# Patient Record
Sex: FEMALE | Race: WHITE | NOT HISPANIC OR LATINO | URBAN - METROPOLITAN AREA
[De-identification: names, ages, dates, MRNs, and addresses within clinical notes are randomized per-mention and may not be internally consistent; named-entity substitution may affect disease eponyms.]

---

## 2022-02-07 VITALS
DIASTOLIC BLOOD PRESSURE: 65 MMHG | TEMPERATURE: 98 F | OXYGEN SATURATION: 88 % | HEIGHT: 62 IN | SYSTOLIC BLOOD PRESSURE: 118 MMHG | RESPIRATION RATE: 18 BRPM | WEIGHT: 250 LBS | HEART RATE: 64 BPM

## 2022-02-07 PROBLEM — Z00.00 ENCOUNTER FOR PREVENTIVE HEALTH EXAMINATION: Status: ACTIVE | Noted: 2022-02-07

## 2022-02-07 LAB
ALBUMIN SERPL ELPH-MCNC: 3.5 G/DL — SIGNIFICANT CHANGE UP (ref 3.3–5)
ALP SERPL-CCNC: 92 U/L — SIGNIFICANT CHANGE UP (ref 40–120)
ALT FLD-CCNC: 12 U/L — SIGNIFICANT CHANGE UP (ref 10–45)
ANION GAP SERPL CALC-SCNC: 10 MMOL/L — SIGNIFICANT CHANGE UP (ref 5–17)
AST SERPL-CCNC: 15 U/L — SIGNIFICANT CHANGE UP (ref 10–40)
BASE EXCESS BLDV CALC-SCNC: 13.8 MMOL/L — HIGH (ref -2–3)
BASOPHILS # BLD AUTO: 0.04 K/UL — SIGNIFICANT CHANGE UP (ref 0–0.2)
BASOPHILS NFR BLD AUTO: 0.4 % — SIGNIFICANT CHANGE UP (ref 0–2)
BILIRUB SERPL-MCNC: 0.5 MG/DL — SIGNIFICANT CHANGE UP (ref 0.2–1.2)
BUN SERPL-MCNC: 21 MG/DL — SIGNIFICANT CHANGE UP (ref 7–23)
CA-I SERPL-SCNC: 1.15 MMOL/L — SIGNIFICANT CHANGE UP (ref 1.15–1.33)
CALCIUM SERPL-MCNC: 9.4 MG/DL — SIGNIFICANT CHANGE UP (ref 8.4–10.5)
CHLORIDE SERPL-SCNC: 93 MMOL/L — LOW (ref 96–108)
CO2 BLDV-SCNC: 42.6 MMOL/L — HIGH (ref 22–26)
CO2 SERPL-SCNC: 36 MMOL/L — HIGH (ref 22–31)
CREAT SERPL-MCNC: 1.02 MG/DL — SIGNIFICANT CHANGE UP (ref 0.5–1.3)
EOSINOPHIL # BLD AUTO: 0.16 K/UL — SIGNIFICANT CHANGE UP (ref 0–0.5)
EOSINOPHIL NFR BLD AUTO: 1.4 % — SIGNIFICANT CHANGE UP (ref 0–6)
FLUAV AG NPH QL: SIGNIFICANT CHANGE UP
FLUBV AG NPH QL: SIGNIFICANT CHANGE UP
GAS PNL BLDV: 134 MMOL/L — LOW (ref 136–145)
GAS PNL BLDV: SIGNIFICANT CHANGE UP
GLUCOSE SERPL-MCNC: 111 MG/DL — HIGH (ref 70–99)
HCO3 BLDV-SCNC: 41 MMOL/L — HIGH (ref 22–29)
HCT VFR BLD CALC: 49.7 % — HIGH (ref 34.5–45)
HGB BLD-MCNC: 15.5 G/DL — SIGNIFICANT CHANGE UP (ref 11.5–15.5)
IMM GRANULOCYTES NFR BLD AUTO: 0.2 % — SIGNIFICANT CHANGE UP (ref 0–1.5)
LIDOCAIN IGE QN: 11 U/L — SIGNIFICANT CHANGE UP (ref 7–60)
LYMPHOCYTES # BLD AUTO: 1.79 K/UL — SIGNIFICANT CHANGE UP (ref 1–3.3)
LYMPHOCYTES # BLD AUTO: 16 % — SIGNIFICANT CHANGE UP (ref 13–44)
MCHC RBC-ENTMCNC: 27 PG — SIGNIFICANT CHANGE UP (ref 27–34)
MCHC RBC-ENTMCNC: 31.2 GM/DL — LOW (ref 32–36)
MCV RBC AUTO: 86.6 FL — SIGNIFICANT CHANGE UP (ref 80–100)
MONOCYTES # BLD AUTO: 1.21 K/UL — HIGH (ref 0–0.9)
MONOCYTES NFR BLD AUTO: 10.8 % — SIGNIFICANT CHANGE UP (ref 2–14)
NEUTROPHILS # BLD AUTO: 7.96 K/UL — HIGH (ref 1.8–7.4)
NEUTROPHILS NFR BLD AUTO: 71.2 % — SIGNIFICANT CHANGE UP (ref 43–77)
NRBC # BLD: 0 /100 WBCS — SIGNIFICANT CHANGE UP (ref 0–0)
NT-PROBNP SERPL-SCNC: 2165 PG/ML — HIGH (ref 0–300)
PCO2 BLDV: 60 MMHG — HIGH (ref 39–42)
PH BLDV: 7.44 — HIGH (ref 7.32–7.43)
PLATELET # BLD AUTO: 366 K/UL — SIGNIFICANT CHANGE UP (ref 150–400)
PO2 BLDV: <29 MMHG — SIGNIFICANT CHANGE UP (ref 25–45)
POTASSIUM BLDV-SCNC: 3.3 MMOL/L — LOW (ref 3.5–5.1)
POTASSIUM SERPL-MCNC: 3.9 MMOL/L — SIGNIFICANT CHANGE UP (ref 3.5–5.3)
POTASSIUM SERPL-SCNC: 3.9 MMOL/L — SIGNIFICANT CHANGE UP (ref 3.5–5.3)
PROT SERPL-MCNC: 6.6 G/DL — SIGNIFICANT CHANGE UP (ref 6–8.3)
RBC # BLD: 5.74 M/UL — HIGH (ref 3.8–5.2)
RBC # FLD: 16.7 % — HIGH (ref 10.3–14.5)
RSV RNA NPH QL NAA+NON-PROBE: SIGNIFICANT CHANGE UP
SAO2 % BLDV: 49.2 % — LOW (ref 67–88)
SARS-COV-2 RNA SPEC QL NAA+PROBE: SIGNIFICANT CHANGE UP
SODIUM SERPL-SCNC: 139 MMOL/L — SIGNIFICANT CHANGE UP (ref 135–145)
TROPONIN T SERPL-MCNC: 0.01 NG/ML — SIGNIFICANT CHANGE UP (ref 0–0.01)
TROPONIN T SERPL-MCNC: <0.01 NG/ML — SIGNIFICANT CHANGE UP (ref 0–0.01)
WBC # BLD: 11.18 K/UL — HIGH (ref 3.8–10.5)
WBC # FLD AUTO: 11.18 K/UL — HIGH (ref 3.8–10.5)

## 2022-02-07 PROCEDURE — 93010 ELECTROCARDIOGRAM REPORT: CPT

## 2022-02-07 PROCEDURE — 71275 CT ANGIOGRAPHY CHEST: CPT | Mod: 26,MA

## 2022-02-07 PROCEDURE — 93971 EXTREMITY STUDY: CPT | Mod: 26,RT

## 2022-02-07 PROCEDURE — 99285 EMERGENCY DEPT VISIT HI MDM: CPT

## 2022-02-07 RX ORDER — ONDANSETRON 8 MG/1
4 TABLET, FILM COATED ORAL ONCE
Refills: 0 | Status: DISCONTINUED | OUTPATIENT
Start: 2022-02-07 | End: 2022-02-07

## 2022-02-07 RX ORDER — ONDANSETRON 8 MG/1
4 TABLET, FILM COATED ORAL ONCE
Refills: 0 | Status: COMPLETED | OUTPATIENT
Start: 2022-02-07 | End: 2022-02-07

## 2022-02-07 RX ADMIN — ONDANSETRON 4 MILLIGRAM(S): 8 TABLET, FILM COATED ORAL at 21:33

## 2022-02-07 NOTE — ED ADULT NURSE NOTE - OBJECTIVE STATEMENT
pt c/o nausea, upper abdominal pain under bilateral ribcage and SOB x a few weeks , hx COPD , placed on  2L o2 as SaO2 was low

## 2022-02-07 NOTE — ED PROVIDER NOTE - PROGRESS NOTE DETAILS
ED chest u/s: R>L B lines, no pleural effusion. will give lasix 20. ED chest u/s: R>L B lines, no pleural effusion. however will hold lasix given sbp 90s. BNP elevated to 2165  trop negative x2  VBG - pCO2 60 / HCO3 41 suspect baseline given COPD  covid / flu negative  RLE Doppler negative for DVT  CTPE prelim without PE. final read pending. ED chest u/s: R>L B lines, no pleural effusion. no acute resp distress. however will hold lasix given sbp 90s.

## 2022-02-07 NOTE — ED ADULT TRIAGE NOTE - OTHER COMPLAINTS
patient c/o CP, SOB, and chills x 2 weeks with lethargy -- patient hypoxic in triage--- speaking in full, complete sentences

## 2022-02-07 NOTE — ED PROVIDER NOTE - OBJECTIVE STATEMENT
60 y/o F pt with PMHx of HTN and COPD (not on home O2) presents to ED c/o intermittent shortness of breath and chest pressure x 2 weeks. Pt states she is feeling very lethargic for the past 2 weeks, with shortness of breath that is worse than her baseline, especially with walking around and laying flat. She also had intermittent chest pressure during this time, has intermittent palpitations for approximately 1 month, specifically at night, and making her feel winded. Pt saw a new cardiologist 1 week ago and had her Amlodipine switched to Metoprolol and combo pill of Valsartan and HCTZ, is scheduled for echo and stress test, but hasn't had it yet. Since then, her symptoms are gradually getting worse, with more substernal chest pressure, and decreased PO intake secondary to symptoms. Pt denies any current chest pain, fever, sore throat, change in baseline cough, abdominal pain, nausea and vomiting, headache, lightheadedness, dizziness, recent travel, or hx of DVT/PE (although she did have a reported blood clot in her L eye in the past). Pt also noticed last month that she had RLE swelling. She is a current smoker, is vaccinated for covid-19 with booster, and never tested positive for covid.

## 2022-02-07 NOTE — ED PROVIDER NOTE - CLINICAL SUMMARY MEDICAL DECISION MAKING FREE TEXT BOX
61 yr old female, history of HTN and COPD (not on home O2), here w shortness of breath / chest pain intermittent x2 weeks. Also RLE swelling. Today chest discomfort worsened.]  Hypoxic on arrival, suspect some baseline hypoxia due to COPD history but has never required oxygen.  improved on 2L NC w sat around 93% and breathing more comfortably.   DDx - ACS, CHF, DVT / PE    Plan  labs w trop / bnp  ekg  CTPE  suspect admission for KIRK, new O2 requirement.

## 2022-02-07 NOTE — ED PROVIDER NOTE - CHPI ED SYMPTOMS NEG
no sore throat, no abdominal pain, no nausea, no vomiting, no headache, no dizziness, no lightheadedness/no chest pain/no fever

## 2022-02-07 NOTE — ED PROVIDER NOTE - EKG #1 DATE/TIME
Occult blood neg.  Case d/w pt's son in ED and agreeable to admission and transfusion.  Case d/w [facility]/Dr. Hall and will admit 07-Feb-2022 19:51

## 2022-02-08 ENCOUNTER — INPATIENT (INPATIENT)
Facility: HOSPITAL | Age: 62
LOS: 1 days | Discharge: HOME | DRG: 216 | End: 2022-02-10
Attending: HOSPITALIST | Admitting: HOSPITALIST
Payer: COMMERCIAL

## 2022-02-08 DIAGNOSIS — E78.5 HYPERLIPIDEMIA, UNSPECIFIED: ICD-10-CM

## 2022-02-08 DIAGNOSIS — E66.01 MORBID (SEVERE) OBESITY DUE TO EXCESS CALORIES: ICD-10-CM

## 2022-02-08 DIAGNOSIS — Z87.891 PERSONAL HISTORY OF NICOTINE DEPENDENCE: ICD-10-CM

## 2022-02-08 DIAGNOSIS — Z87.828 PERSONAL HISTORY OF OTHER (HEALED) PHYSICAL INJURY AND TRAUMA: Chronic | ICD-10-CM

## 2022-02-08 DIAGNOSIS — Z90.49 ACQUIRED ABSENCE OF OTHER SPECIFIED PARTS OF DIGESTIVE TRACT: Chronic | ICD-10-CM

## 2022-02-08 DIAGNOSIS — R07.9 CHEST PAIN, UNSPECIFIED: ICD-10-CM

## 2022-02-08 DIAGNOSIS — G47.33 OBSTRUCTIVE SLEEP APNEA (ADULT) (PEDIATRIC): ICD-10-CM

## 2022-02-08 DIAGNOSIS — I11.0 HYPERTENSIVE HEART DISEASE WITH HEART FAILURE: ICD-10-CM

## 2022-02-08 DIAGNOSIS — Z82.49 FAMILY HISTORY OF ISCHEMIC HEART DISEASE AND OTHER DISEASES OF THE CIRCULATORY SYSTEM: ICD-10-CM

## 2022-02-08 DIAGNOSIS — I25.110 ATHEROSCLEROTIC HEART DISEASE OF NATIVE CORONARY ARTERY WITH UNSTABLE ANGINA PECTORIS: ICD-10-CM

## 2022-02-08 DIAGNOSIS — R06.02 SHORTNESS OF BREATH: ICD-10-CM

## 2022-02-08 DIAGNOSIS — R09.02 HYPOXEMIA: ICD-10-CM

## 2022-02-08 DIAGNOSIS — J44.9 CHRONIC OBSTRUCTIVE PULMONARY DISEASE, UNSPECIFIED: ICD-10-CM

## 2022-02-08 DIAGNOSIS — I10 ESSENTIAL (PRIMARY) HYPERTENSION: ICD-10-CM

## 2022-02-08 DIAGNOSIS — I50.33 ACUTE ON CHRONIC DIASTOLIC (CONGESTIVE) HEART FAILURE: ICD-10-CM

## 2022-02-08 LAB
ANION GAP SERPL CALC-SCNC: 13 MMOL/L — SIGNIFICANT CHANGE UP (ref 5–17)
BUN SERPL-MCNC: 25 MG/DL — HIGH (ref 7–23)
CALCIUM SERPL-MCNC: 8.7 MG/DL — SIGNIFICANT CHANGE UP (ref 8.4–10.5)
CHLORIDE SERPL-SCNC: 97 MMOL/L — SIGNIFICANT CHANGE UP (ref 96–108)
CK MB CFR SERPL CALC: <1 NG/ML — SIGNIFICANT CHANGE UP (ref 0–6.7)
CK SERPL-CCNC: 18 U/L — LOW (ref 25–170)
CO2 SERPL-SCNC: 33 MMOL/L — HIGH (ref 22–31)
CREAT SERPL-MCNC: 1.11 MG/DL — SIGNIFICANT CHANGE UP (ref 0.5–1.3)
GLUCOSE SERPL-MCNC: 127 MG/DL — HIGH (ref 70–99)
HCT VFR BLD CALC: 49.9 % — HIGH (ref 34.5–45)
HCV AB S/CO SERPL IA: 0.04 S/CO — SIGNIFICANT CHANGE UP
HCV AB SERPL-IMP: SIGNIFICANT CHANGE UP
HGB BLD-MCNC: 14.5 G/DL — SIGNIFICANT CHANGE UP (ref 11.5–15.5)
MAGNESIUM SERPL-MCNC: 2.2 MG/DL — SIGNIFICANT CHANGE UP (ref 1.6–2.6)
MCHC RBC-ENTMCNC: 26 PG — LOW (ref 27–34)
MCHC RBC-ENTMCNC: 29.1 GM/DL — LOW (ref 32–36)
MCV RBC AUTO: 89.6 FL — SIGNIFICANT CHANGE UP (ref 80–100)
NRBC # BLD: 0 /100 WBCS — SIGNIFICANT CHANGE UP (ref 0–0)
PLATELET # BLD AUTO: 333 K/UL — SIGNIFICANT CHANGE UP (ref 150–400)
POTASSIUM SERPL-MCNC: 4.1 MMOL/L — SIGNIFICANT CHANGE UP (ref 3.5–5.3)
POTASSIUM SERPL-SCNC: 4.1 MMOL/L — SIGNIFICANT CHANGE UP (ref 3.5–5.3)
RBC # BLD: 5.57 M/UL — HIGH (ref 3.8–5.2)
RBC # FLD: 16.2 % — HIGH (ref 10.3–14.5)
SODIUM SERPL-SCNC: 143 MMOL/L — SIGNIFICANT CHANGE UP (ref 135–145)
TROPONIN T SERPL-MCNC: 0.01 NG/ML — SIGNIFICANT CHANGE UP (ref 0–0.01)
WBC # BLD: 9.18 K/UL — SIGNIFICANT CHANGE UP (ref 3.8–10.5)
WBC # FLD AUTO: 9.18 K/UL — SIGNIFICANT CHANGE UP (ref 3.8–10.5)

## 2022-02-08 PROCEDURE — 75574 CT ANGIO HRT W/3D IMAGE: CPT | Mod: 26

## 2022-02-08 PROCEDURE — 99223 1ST HOSP IP/OBS HIGH 75: CPT

## 2022-02-08 PROCEDURE — 93306 TTE W/DOPPLER COMPLETE: CPT | Mod: 26

## 2022-02-08 RX ORDER — ONDANSETRON 8 MG/1
4 TABLET, FILM COATED ORAL ONCE
Refills: 0 | Status: COMPLETED | OUTPATIENT
Start: 2022-02-08 | End: 2022-02-08

## 2022-02-08 RX ORDER — PANTOPRAZOLE SODIUM 20 MG/1
40 TABLET, DELAYED RELEASE ORAL
Refills: 0 | Status: DISCONTINUED | OUTPATIENT
Start: 2022-02-09 | End: 2022-02-10

## 2022-02-08 RX ORDER — METOPROLOL TARTRATE 50 MG
25 TABLET ORAL DAILY
Refills: 0 | Status: DISCONTINUED | OUTPATIENT
Start: 2022-02-08 | End: 2022-02-08

## 2022-02-08 RX ORDER — FUROSEMIDE 40 MG
20 TABLET ORAL ONCE
Refills: 0 | Status: COMPLETED | OUTPATIENT
Start: 2022-02-08 | End: 2022-02-08

## 2022-02-08 RX ORDER — VALSARTAN 80 MG/1
80 TABLET ORAL DAILY
Refills: 0 | Status: DISCONTINUED | OUTPATIENT
Start: 2022-02-08 | End: 2022-02-08

## 2022-02-08 RX ORDER — VALSARTAN 80 MG/1
0 TABLET ORAL
Qty: 0 | Refills: 0 | DISCHARGE

## 2022-02-08 RX ORDER — ASPIRIN/CALCIUM CARB/MAGNESIUM 324 MG
81 TABLET ORAL DAILY
Refills: 0 | Status: DISCONTINUED | OUTPATIENT
Start: 2022-02-08 | End: 2022-02-10

## 2022-02-08 RX ORDER — ACETAMINOPHEN 500 MG
650 TABLET ORAL EVERY 6 HOURS
Refills: 0 | Status: DISCONTINUED | OUTPATIENT
Start: 2022-02-08 | End: 2022-02-10

## 2022-02-08 RX ORDER — FUROSEMIDE 40 MG
20 TABLET ORAL
Refills: 0 | Status: DISCONTINUED | OUTPATIENT
Start: 2022-02-08 | End: 2022-02-09

## 2022-02-08 RX ORDER — ENOXAPARIN SODIUM 100 MG/ML
40 INJECTION SUBCUTANEOUS EVERY 12 HOURS
Refills: 0 | Status: DISCONTINUED | OUTPATIENT
Start: 2022-02-08 | End: 2022-02-09

## 2022-02-08 RX ORDER — NITROGLYCERIN 6.5 MG
1 CAPSULE, EXTENDED RELEASE ORAL ONCE
Refills: 0 | Status: DISCONTINUED | OUTPATIENT
Start: 2022-02-08 | End: 2022-02-08

## 2022-02-08 RX ORDER — INFLUENZA VIRUS VACCINE 15; 15; 15; 15 UG/.5ML; UG/.5ML; UG/.5ML; UG/.5ML
0.5 SUSPENSION INTRAMUSCULAR ONCE
Refills: 0 | Status: DISCONTINUED | OUTPATIENT
Start: 2022-02-08 | End: 2022-02-10

## 2022-02-08 RX ORDER — METOPROLOL TARTRATE 50 MG
0 TABLET ORAL
Qty: 0 | Refills: 0 | DISCHARGE

## 2022-02-08 RX ORDER — FUROSEMIDE 40 MG
60 TABLET ORAL ONCE
Refills: 0 | Status: DISCONTINUED | OUTPATIENT
Start: 2022-02-08 | End: 2022-02-08

## 2022-02-08 RX ORDER — IPRATROPIUM/ALBUTEROL SULFATE 18-103MCG
3 AEROSOL WITH ADAPTER (GRAM) INHALATION EVERY 6 HOURS
Refills: 0 | Status: DISCONTINUED | OUTPATIENT
Start: 2022-02-08 | End: 2022-02-10

## 2022-02-08 RX ADMIN — Medication 81 MILLIGRAM(S): at 11:54

## 2022-02-08 RX ADMIN — ONDANSETRON 4 MILLIGRAM(S): 8 TABLET, FILM COATED ORAL at 22:37

## 2022-02-08 RX ADMIN — Medication 650 MILLIGRAM(S): at 14:59

## 2022-02-08 RX ADMIN — ONDANSETRON 4 MILLIGRAM(S): 8 TABLET, FILM COATED ORAL at 06:00

## 2022-02-08 RX ADMIN — Medication 20 MILLIGRAM(S): at 13:37

## 2022-02-08 RX ADMIN — Medication 3 MILLILITER(S): at 06:45

## 2022-02-08 RX ADMIN — Medication 3 MILLILITER(S): at 14:31

## 2022-02-08 RX ADMIN — Medication 650 MILLIGRAM(S): at 13:59

## 2022-02-08 RX ADMIN — ENOXAPARIN SODIUM 40 MILLIGRAM(S): 100 INJECTION SUBCUTANEOUS at 18:44

## 2022-02-08 RX ADMIN — ENOXAPARIN SODIUM 40 MILLIGRAM(S): 100 INJECTION SUBCUTANEOUS at 05:58

## 2022-02-08 NOTE — PATIENT PROFILE ADULT - FALL HARM RISK - CONCLUSION
Reason for Disposition  • [1] Diarrhea AND [2] age < 1 year    Protocols used: DIARRHEA-P-AH     Fall with Harm Risk

## 2022-02-08 NOTE — H&P ADULT - HISTORY OF PRESENT ILLNESS
This is a 60y/o obese female, recent smoker (quit 2 weeks ago), FHx of CD (father  of MI at age 55), PMHx asthma -no home oxygen, HTN who presented to the ER with 1 month of progressive SOB on minimal exertion, 2 pillow orthopnea, MELQUIADES edema, palpitations and chest pain. She describes pain as burning sensation, band-like under both breasts, that has been constant but worsened with exertion rated 7/10. She saw a cardiologist on  who discontinued her Lotrel and started her Valsartan/HCTz in the morning and Toprol at night. She was scheduled for outpt stress test, echocardiogram and carotid US at the end of the week but symptoms have been worsened so she presented for management  In the ER, initial VS with /65, HR 64, R 18, T 97.7, O2 88% RA. Labs with WBC 11.1, BNP 2165, VBG 7.44, 42.6, covid negative. CT chest negative PE, no consolidation but + Bilateral reticular opacities consistent with pleural and parenchymal scarring, + small pericardial effusion. Bedside US chest with B lines. Pt was ordered for Lasix 20 mg in the ER but held off due to hypotension 96/61. She is admitted for further management This is a 62y/o obese female, recent smoker (quit 2 weeks ago), FHx of CD (father  of MI at age 55), PMHx asthma -no home oxygen, HTN who presented to the ER with 1 month of progressive SOB on minimal exertion, 2 pillow orthopnea, MELQUIADES edema, palpitations and chest pain. She describes pain as burning sensation, band-like under both breasts, that has been constant but worsened with exertion rated 7/10. She saw a cardiologist on  who discontinued her Lotrel and started her Valsartan/HCTz in the morning and Toprol at night. She was scheduled for outpt stress test, echocardiogram and carotid US at the end of the week but symptoms have been worsened so she presented for management  In the ER, initial VS with /65, HR 64, R 18, T 97.7, O2 88% RA. Labs with WBC 11.1, BNP 2165, VBG with PH 7.44, pCO2 60, covid negative. EKG with TWI in V1-V3; CT chest negative PE, no consolidation but + Bilateral reticular opacities consistent with pleural and parenchymal scarring, + small pericardial effusion. Bedside US chest with B lines. Pt was ordered for Lasix 20 mg in the ER but held off due to hypotension 96/61. She is admitted for further management

## 2022-02-08 NOTE — PROGRESS NOTE ADULT - SUBJECTIVE AND OBJECTIVE BOX
Patient Summary: Patient is a 62 y/o F, recent smoker (quit 2 weeks ago), FHx of CAD (father  of MI at age 55), PMHx asthma (no oxygen at home), HTN, who presented to [facility] ED with complaints of progressive SOB on minimal exertion, 2 pillow orthopnea, MELQUIADES edema, palpitations, and chest pain, described as a burning sensation under both breasts. Patient recently saw her cardiologist, Dr Gerald Cm, was put on Valsartan/hctxz and toprol at night, recommended for outpatient stress test, echo, and carotid US - however, patient feeling chest pain, which prompted her to come to ED sooner. Patient admitted to cardiology/telemetry for further workup of chest pain, r/o ACS. Patient underwent bedside US in ED, which showed bilateral reticular opacities and b lines. Patient started on Lasix 20 mg IV BID and will undergo CCTA for ischemic eval.     Chest pain.   - Constant band-like chest pain, worse with exertion  - Troponin negative x 3  - ECHO 22: Patient was bradycardia during the study. Aortic sclerosis without significant stenosis. No significant valvular disease.The right ventricle is normal in size. Right ventricular systolic function is normal.The left ventricle cavity size is normal. Left ventricular ejection fraction is 55%. Poor endocardial delineation precludes accurate   assessment of regional wall motion. There is probably apical septal akinesis. There is basal to mid inferolateral wall hypokinesis. Trivial pericardial effusion.  Consider repeat TTE with imaging solution such as Definity to better enhance the endocardium and evaluate the left ventricular function, if clinically indicated EF: 55%.  - CCTA 2021, f/u results   - Continue Aspirin 81 mg once daily     Acute Hypoxemia   - Patient desatting to 88% on RA, feeling SOB  - CTA negative for PE; Dopplers (-) for PE   - Patient with wheezing on exam - Duonebs ordered  - Continue diuresis with Lasix 20 mg IV BID    Hypertension   - Patient recently started on metoprolol succinate 25 mg and lsartan-hydrochlorothiazide 160 mg-25 mg oral tablet  - Patient's HR has been in 50s and BP softer side; will hold off on antihypertensives in setting of diuresis       F: none   E: K > 4 , Mg > 2  N: DASH/TLC  Dvt: Heparin   GI: Pantoprazole  Dispo: pending CCTA and diuresis     Case discussed with Dr. Jonas        Patient Summary: Patient is a 60 y/o F, recent smoker (quit 2 weeks ago), FHx of CAD (father  of MI at age 55), PMHx asthma (no oxygen at home), HTN, who presented to [facility] ED with complaints of progressive SOB on minimal exertion, 2 pillow orthopnea, MELQUIADES edema, palpitations, and chest pain, described as a burning sensation under both breasts. Patient recently saw her cardiologist, Dr Gerald Cm, was put on Valsartan/hctxz and toprol at night, recommended for outpatient stress test, echo, and carotid US - however, patient feeling chest pain, which prompted her to come to ED sooner. Patient admitted to cardiology/telemetry for further workup of chest pain, r/o ACS. Patient underwent bedside US in ED, which showed bilateral reticular opacities and b lines. Patient started on Lasix 20 mg IV BID and will undergo CCTA for ischemic eval.     Chest pain.   - Constant band-like chest pain, worse with exertion  - Troponin negative x 3  - ECHO 22: Patient was bradycardia during the study. Aortic sclerosis without significant stenosis. No significant valvular disease.The right ventricle is normal in size. Right ventricular systolic function is normal.The left ventricle cavity size is normal. Left ventricular ejection fraction is 55%. Poor endocardial delineation precludes accurate   assessment of regional wall motion. There is probably apical septal akinesis. There is basal to mid inferolateral wall hypokinesis. Trivial pericardial effusion.  Consider repeat TTE with imaging solution such as Definity to better enhance the endocardium and evaluate the left ventricular function, if clinically indicated EF: 55%.  - CCTA 2021, f/u results   - Continue Aspirin 81 mg once daily     Acute Hypoxemia   - Patient desatting to 88% on RA, feeling SOB  - CTA negative for PE; Dopplers (-) for PE   - Patient with wheezing on exam - Duonebs ordered  - Continue diuresis with Lasix 20 mg IV BID    Asthma   - History of asthma, possible SHANKAR or Hypoventilation Syndrome given body habitus and smokign history  - Consider pulm consult; likely will need sleep study and pfts as outpateint   - PRN Nebs     Hypertension   - Patient recently started on metoprolol succinate 25 mg and losartan-hydrochlorothiazide 160 mg-25 mg oral tablet  - Patient's HR has been in 50s and BP softer side; will hold off on antihypertensives in setting of diuresis       F: none   E: K > 4 , Mg > 2  N: DASH/TLC  Dvt: Lovenox   GI: Pantoprazole  Dispo: pending CCTA and diuresis     Case discussed with Dr. Jonas

## 2022-02-08 NOTE — H&P ADULT - NSICDXFAMILYHX_GEN_ALL_CORE_FT
FAMILY HISTORY:  Father  Still living? No  FH: myocardial infarction, Age at diagnosis: Age Unknown    Mother  Still living? No  Family history of valvular heart disease, Age at diagnosis: Age Unknown

## 2022-02-08 NOTE — DIETITIAN INITIAL EVALUATION ADULT. - OTHER INFO
61yoF, PMHx asthma -no home oxygen, HTN who presented to the ER with 1 month of progressive SOB on minimal exertion, 2 pillow orthopnea, MELQUIADES edema, palpitations and chest pain. Pt was ordered for Lasix 20 mg in the ER but held off due to hypotension 96/61. She is admitted for further management of SOB. Plan to R/o ACS, ECHO today and CCTA vs stress testing Pending results per IDR.    Spoke with pt/RN. Pt alert in room. NPO at this time for Pending w/u. PTA reports not eating well in setting of not feeling good x3 weeks. Would eat things like toast, english muffin. Prior to this episode pt reports trying to watch diet, however feels she could have better portion control. Reports wt gain d/t being less active during COVID however feels wt decrease d/t recent decreased PO intake.  pounds, had gone up to 250 pounds. Pt reports being told wt of 235 pounds in ED. Bedscale wt today of 242 pounds which would suggest wt stable however does remain with 1+Gen edema which may be masking wt loss. Will cont to monitor wts. No overt wasting/loss noted. NKFA. +Pain, per RN 5/10 Chest pain is ongoing. +BM 2/7. No pressure ulcers.   Please see below for nutritions recommendations.

## 2022-02-08 NOTE — H&P ADULT - PROBLEM SELECTOR PLAN 1
Constant band-like chest pain, worse with exertion  2 negative troponin, check 3rd trop with AM lab  Echocardiogram to evaluate wall motion, valves, pericardial effusion seen on CT chest  NPO for CCTA vs stress test

## 2022-02-08 NOTE — H&P ADULT - ATTENDING COMMENTS
61F, morbidly obese, smoker, h/o Asthma, HTN and famHx of CAD p/w SOB w/ minimal exertion, LE swelling admitted for possible new-onset CHF and further cardiac w/u    -CHF - Pt. significantly obese, difficult to assess JVD, no LE edema; s/p TTE: Normal LVEF 55%, w/ possible regional WMA - Inferoseptal hypokinesis and ?Apical hypokinesis, No significant valvular disease, Normal Atria, Normal RV function, PASP ~22mmHg - Poor endocardial resolution; f/u CCTA to r/o CAD; c/w Lasix 20 IV BID; Soft BPs - Toprol/Valsartan both held(recently started as an outpatient) - Will likely d/c diuretic tmrw. If negative CCTA, would obtain cMRI  -Pulm - h/o Asthma, possible SHANKAR and/or Obstructive lung disease given smoking history; CTA Chest w/ no evidence of PE; Consider Pulm consult; Will likely need outpatient Sleep study and PFTs; c/w PRN Nebs  -DASH diet  -DVT PPx  -Dispo: Cardiac Tele    Norah Jonas MD  Cardiology Attending

## 2022-02-08 NOTE — PATIENT PROFILE ADULT - FALL HARM RISK - HARM RISK INTERVENTIONS
Assistance with ambulation/Assistance OOB with selected safe patient handling equipment/Communicate Risk of Fall with Harm to all staff/Monitor gait and stability/Reinforce activity limits and safety measures with patient and family/Sit up slowly, dangle for a short time, stand at bedside before walking/Tailored Fall Risk Interventions/Visual Cue: Yellow wristband and red socks/Bed in lowest position, wheels locked, appropriate side rails in place/Call bell, personal items and telephone in reach/Instruct patient to call for assistance before getting out of bed or chair/Non-slip footwear when patient is out of bed/[place] to call system/Physically safe environment - no spills, clutter or unnecessary equipment/Purposeful Proactive Rounding/Room/bathroom lighting operational, light cord in reach

## 2022-02-08 NOTE — ED ADULT NURSE REASSESSMENT NOTE - NS ED NURSE REASSESS COMMENT FT1
Received report from SCARLETT Cespedes. Received patient in stretcher. AOX4. VSS.  Patient denies chest pain, pain, discomfort, shortness of breath, difficulty breathing and any form of distress not noted. Patient oriented to ED area. Plan of care discussed and verbalized understanding. All needs attended. Pt on cardiac monitor. Fall risk precautions maintained. Purposeful proactive hourly rounding in progress.

## 2022-02-08 NOTE — DIETITIAN INITIAL EVALUATION ADULT. - ADD RECOMMEND
Monitor Skin, Wts, Pain, GI, Labs. RD to remain available for additional nutrition interventions as needed.

## 2022-02-08 NOTE — H&P ADULT - NSHPREVIEWOFSYSTEMS_GEN_ALL_CORE
GENERAL, CONSTITUTIONAL : denies recent weight loss, fever, chills  EYES, VISION: denies changes in vision   EARS, NOSE, THROAT: denies hearing loss  HEART, CARDIOVASCULAR:  + chest pain, palpitations, SOB, LE edema. Denies arrhythmia, claudication  RESPIRATORY: + SOB, wheezing, orthopnea/ Denies cough, PND  GASTROINTESTINAL: Denies abdominal pain, heartburn, bloody stool, dark tarry stool  GENITOURINARY: Denies frequent urination, urgency  MUSCULOSKELETAL: MELQUIADES. +Denies joint pain or swelling, restricted motion, musculoskeletal pain.   SKIN & INTEGUMENTARY Denies rashes, sores, blisters, blisters, growths.  NEUROLOGICAL: Denies numbness or tingling sensations, sensation loss, burning.   PSYCHIATRIC: Denies nervousness, anxiety, depression  ENDOCRINE Denies heat or cold intolerance, excessive thirst  HEMATOLOGIC/LYMPHATIC: Denies abnormal bleeding, bleeding of any kind

## 2022-02-08 NOTE — DIETITIAN INITIAL EVALUATION ADULT. - PERSON TAUGHT/METHOD
Encourage PO intake during meals & reviewed importance - spoke about lean proteins. DASH diet review./verbal instruction/patient instructed/teach back - (Patient repeats in own words)

## 2022-02-08 NOTE — DIETITIAN INITIAL EVALUATION ADULT. - DIET TYPE
Diet to be advanced in 24-48hrs. DASH TLC. Monitor %PO intake and need for oral nutrition supplements Pending %PO once diet resumed.

## 2022-02-08 NOTE — DIETITIAN INITIAL EVALUATION ADULT. - OTHER CALCULATIONS
%GZQ=638; IBW used to calculate energy needs due to pt's current body weight exceeding 120% of IBW; adjusted for age/BMI

## 2022-02-08 NOTE — H&P ADULT - NSHPPHYSICALEXAM_GEN_ALL_CORE
T(C): 36.3 (02-08-22 @ 02:27), Max: 36.8 (02-08-22 @ 01:55)  HR: 65 (02-08-22 @ 01:55) (60 - 75)  BP: 98/82 (02-08-22 @ 01:55) (96/54 - 120/69)  RR: 20 (02-08-22 @ 01:55) (18 - 20)  SpO2: 98% (02-08-22 @ 01:55) (88% - 98%)  Wt(kg): --    Appearance: Normal	  HEENT:   Normal oral mucosa, PERRL, EOMI	  Neck: Supple, - JVD; No Carotid Bruit and 2+ pulses B/L  Cardiovascular: Normal S1 S2, No JVD, No murmurs  Respiratory: expiratory wheezing. No Rales, Rhonchi	  Gastrointestinal:  Soft, Non-tender, + BS	  Skin: No rashes, No ecchymoses, No cyanosis  Extremities: Trace LE edema R>L. Normal range of motion, No clubbing, cyanosis   Vascular: Femoral pulses 2+ b/l without bruit, DP 1+ b/l, PT 1+ b/l  Neurologic: Non-focal  Psychiatry: A & O x 3, Mood & affect appropriate

## 2022-02-08 NOTE — H&P ADULT - NSHPLABSRESULTS_GEN_ALL_CORE
15.5   11.18 )-----------( 366      ( 07 Feb 2022 20:51 )             49.7       02-07    139  |  93<L>  |  21  ----------------------------<  111<H>  3.9   |  36<H>  |  1.02    Ca    9.4      07 Feb 2022 20:51    TPro  6.6  /  Alb  3.5  /  TBili  0.5  /  DBili  x   /  AST  15  /  ALT  12  /  AlkPhos  92  02-07          CARDIAC MARKERS ( 07 Feb 2022 22:57 )  x     / 0.01 ng/mL / x     / x     / x      CARDIAC MARKERS ( 07 Feb 2022 20:51 )  x     / <0.01 ng/mL / x     / x     / x

## 2022-02-09 DIAGNOSIS — J44.9 CHRONIC OBSTRUCTIVE PULMONARY DISEASE, UNSPECIFIED: ICD-10-CM

## 2022-02-09 LAB
ALBUMIN SERPL ELPH-MCNC: 3.4 G/DL — SIGNIFICANT CHANGE UP (ref 3.3–5)
ALP SERPL-CCNC: 86 U/L — SIGNIFICANT CHANGE UP (ref 40–120)
ALT FLD-CCNC: 11 U/L — SIGNIFICANT CHANGE UP (ref 10–45)
ANION GAP SERPL CALC-SCNC: 10 MMOL/L — SIGNIFICANT CHANGE UP (ref 5–17)
AST SERPL-CCNC: 14 U/L — SIGNIFICANT CHANGE UP (ref 10–40)
BASOPHILS # BLD AUTO: 0.04 K/UL — SIGNIFICANT CHANGE UP (ref 0–0.2)
BASOPHILS NFR BLD AUTO: 0.5 % — SIGNIFICANT CHANGE UP (ref 0–2)
BILIRUB SERPL-MCNC: 0.5 MG/DL — SIGNIFICANT CHANGE UP (ref 0.2–1.2)
BUN SERPL-MCNC: 25 MG/DL — HIGH (ref 7–23)
CALCIUM SERPL-MCNC: 9.2 MG/DL — SIGNIFICANT CHANGE UP (ref 8.4–10.5)
CHLORIDE SERPL-SCNC: 98 MMOL/L — SIGNIFICANT CHANGE UP (ref 96–108)
CO2 SERPL-SCNC: 35 MMOL/L — HIGH (ref 22–31)
CREAT SERPL-MCNC: 0.96 MG/DL — SIGNIFICANT CHANGE UP (ref 0.5–1.3)
EOSINOPHIL # BLD AUTO: 0.08 K/UL — SIGNIFICANT CHANGE UP (ref 0–0.5)
EOSINOPHIL NFR BLD AUTO: 0.9 % — SIGNIFICANT CHANGE UP (ref 0–6)
GLUCOSE BLDC GLUCOMTR-MCNC: 204 MG/DL — HIGH (ref 70–99)
GLUCOSE SERPL-MCNC: 96 MG/DL — SIGNIFICANT CHANGE UP (ref 70–99)
HCT VFR BLD CALC: 48.9 % — HIGH (ref 34.5–45)
HGB BLD-MCNC: 14.1 G/DL — SIGNIFICANT CHANGE UP (ref 11.5–15.5)
IMM GRANULOCYTES NFR BLD AUTO: 0.3 % — SIGNIFICANT CHANGE UP (ref 0–1.5)
LYMPHOCYTES # BLD AUTO: 1.82 K/UL — SIGNIFICANT CHANGE UP (ref 1–3.3)
LYMPHOCYTES # BLD AUTO: 21.2 % — SIGNIFICANT CHANGE UP (ref 13–44)
MAGNESIUM SERPL-MCNC: 2.8 MG/DL — HIGH (ref 1.6–2.6)
MCHC RBC-ENTMCNC: 26.1 PG — LOW (ref 27–34)
MCHC RBC-ENTMCNC: 28.8 GM/DL — LOW (ref 32–36)
MCV RBC AUTO: 90.4 FL — SIGNIFICANT CHANGE UP (ref 80–100)
MONOCYTES # BLD AUTO: 0.84 K/UL — SIGNIFICANT CHANGE UP (ref 0–0.9)
MONOCYTES NFR BLD AUTO: 9.8 % — SIGNIFICANT CHANGE UP (ref 2–14)
NEUTROPHILS # BLD AUTO: 5.79 K/UL — SIGNIFICANT CHANGE UP (ref 1.8–7.4)
NEUTROPHILS NFR BLD AUTO: 67.3 % — SIGNIFICANT CHANGE UP (ref 43–77)
NRBC # BLD: 0 /100 WBCS — SIGNIFICANT CHANGE UP (ref 0–0)
PLATELET # BLD AUTO: 306 K/UL — SIGNIFICANT CHANGE UP (ref 150–400)
POTASSIUM SERPL-MCNC: 4.2 MMOL/L — SIGNIFICANT CHANGE UP (ref 3.5–5.3)
POTASSIUM SERPL-SCNC: 4.2 MMOL/L — SIGNIFICANT CHANGE UP (ref 3.5–5.3)
PROT SERPL-MCNC: 6.2 G/DL — SIGNIFICANT CHANGE UP (ref 6–8.3)
RBC # BLD: 5.41 M/UL — HIGH (ref 3.8–5.2)
RBC # FLD: 16.2 % — HIGH (ref 10.3–14.5)
SODIUM SERPL-SCNC: 143 MMOL/L — SIGNIFICANT CHANGE UP (ref 135–145)
WBC # BLD: 8.6 K/UL — SIGNIFICANT CHANGE UP (ref 3.8–10.5)
WBC # FLD AUTO: 8.6 K/UL — SIGNIFICANT CHANGE UP (ref 3.8–10.5)

## 2022-02-09 PROCEDURE — 92978 ENDOLUMINL IVUS OCT C 1ST: CPT | Mod: 26,LD

## 2022-02-09 PROCEDURE — 99233 SBSQ HOSP IP/OBS HIGH 50: CPT

## 2022-02-09 PROCEDURE — 92928 PRQ TCAT PLMT NTRAC ST 1 LES: CPT | Mod: LD

## 2022-02-09 PROCEDURE — 33990 INSJ PERQ VAD L HRT ARTERIAL: CPT

## 2022-02-09 PROCEDURE — 99152 MOD SED SAME PHYS/QHP 5/>YRS: CPT

## 2022-02-09 PROCEDURE — 93458 L HRT ARTERY/VENTRICLE ANGIO: CPT | Mod: 26,59

## 2022-02-09 RX ORDER — ACETAMINOPHEN 500 MG
650 TABLET ORAL EVERY 6 HOURS
Refills: 0 | Status: DISCONTINUED | OUTPATIENT
Start: 2022-02-09 | End: 2022-02-09

## 2022-02-09 RX ORDER — SODIUM CHLORIDE 9 MG/ML
250 INJECTION INTRAMUSCULAR; INTRAVENOUS; SUBCUTANEOUS ONCE
Refills: 0 | Status: COMPLETED | OUTPATIENT
Start: 2022-02-09 | End: 2022-02-09

## 2022-02-09 RX ORDER — ACETAMINOPHEN 500 MG
650 TABLET ORAL ONCE
Refills: 0 | Status: COMPLETED | OUTPATIENT
Start: 2022-02-09 | End: 2022-02-09

## 2022-02-09 RX ORDER — ASPIRIN/CALCIUM CARB/MAGNESIUM 324 MG
163 TABLET ORAL DAILY
Refills: 0 | Status: DISCONTINUED | OUTPATIENT
Start: 2022-02-09 | End: 2022-02-09

## 2022-02-09 RX ORDER — CLOPIDOGREL BISULFATE 75 MG/1
600 TABLET, FILM COATED ORAL ONCE
Refills: 0 | Status: COMPLETED | OUTPATIENT
Start: 2022-02-09 | End: 2022-02-09

## 2022-02-09 RX ORDER — ONDANSETRON 8 MG/1
4 TABLET, FILM COATED ORAL ONCE
Refills: 0 | Status: COMPLETED | OUTPATIENT
Start: 2022-02-09 | End: 2022-02-09

## 2022-02-09 RX ORDER — ASPIRIN/CALCIUM CARB/MAGNESIUM 324 MG
162 TABLET ORAL ONCE
Refills: 0 | Status: COMPLETED | OUTPATIENT
Start: 2022-02-09 | End: 2022-02-09

## 2022-02-09 RX ORDER — ONDANSETRON 8 MG/1
4 TABLET, FILM COATED ORAL ONCE
Refills: 0 | Status: DISCONTINUED | OUTPATIENT
Start: 2022-02-09 | End: 2022-02-09

## 2022-02-09 RX ADMIN — Medication 3 MILLILITER(S): at 00:09

## 2022-02-09 RX ADMIN — Medication 650 MILLIGRAM(S): at 01:50

## 2022-02-09 RX ADMIN — Medication 650 MILLIGRAM(S): at 22:28

## 2022-02-09 RX ADMIN — ONDANSETRON 4 MILLIGRAM(S): 8 TABLET, FILM COATED ORAL at 12:00

## 2022-02-09 RX ADMIN — PANTOPRAZOLE SODIUM 40 MILLIGRAM(S): 20 TABLET, DELAYED RELEASE ORAL at 06:52

## 2022-02-09 RX ADMIN — SODIUM CHLORIDE 250 MILLILITER(S): 9 INJECTION INTRAMUSCULAR; INTRAVENOUS; SUBCUTANEOUS at 11:48

## 2022-02-09 RX ADMIN — Medication 162 MILLIGRAM(S): at 15:04

## 2022-02-09 RX ADMIN — Medication 650 MILLIGRAM(S): at 22:30

## 2022-02-09 RX ADMIN — Medication 3 MILLILITER(S): at 22:28

## 2022-02-09 RX ADMIN — Medication 3 MILLILITER(S): at 13:37

## 2022-02-09 RX ADMIN — Medication 81 MILLIGRAM(S): at 06:52

## 2022-02-09 RX ADMIN — Medication 650 MILLIGRAM(S): at 01:00

## 2022-02-09 RX ADMIN — CLOPIDOGREL BISULFATE 600 MILLIGRAM(S): 75 TABLET, FILM COATED ORAL at 15:04

## 2022-02-09 RX ADMIN — Medication 650 MILLIGRAM(S): at 19:26

## 2022-02-09 RX ADMIN — Medication 3 MILLILITER(S): at 06:52

## 2022-02-09 NOTE — PROVIDER CONTACT NOTE (OTHER) - SITUATION
SBP in 80s, states she feels weak, but denies CP, SOB, or palpitations.
Patient c/o 6/10 pain below breasts and on left side of back. States it is similar to pain she had on admission.

## 2022-02-09 NOTE — PROGRESS NOTE ADULT - ASSESSMENT
Patient is a 60 y/o F, recent smoker (quit 2 weeks ago), FHx of CAD (father  of MI at age 55), PMHx asthma (no oxygen at home), HTN, who presented to [facility] ED with complaints of progressive SOB on minimal exertion, 2 pillow orthopnea, MELQUIADES edema, palpitations, and chest pain, described as a burning sensation under both breasts. Patient recently saw her cardiologist, Dr Gerald Cm, was put on Valsartan/hctxz and toprol at night, recommended for outpatient stress test, echo, and carotid US - however, patient feeling chest pain, which prompted her to come to ED sooner. Patient admitted to cardiology/telemetry for further workup of chest pain, r/o ACS. Patient underwent bedside US in ED, which showed bilateral reticular opacities and b lines. Patient started on Lasix 20 mg IV BID, now euvolemic, CCTA positive for mLAD and RCA disease. Patient to undergo cardiac cath with Dr. Goel today 2022.    PRE-CATH CHECKLIST     Shellfish/Contrast Allergies?  No;     PULSES:	   B  R 2+ B/L   FEM 1+ B/L, no bruit      DP/PT 1+ B/L     COVID Result within 48-72hours:  Negative     ASA: III	Mallampati class: III   Anginal Class: III    Sedation Plan:   [  ] None   [ x ] Moderate   [  ]  Deep    [  ]  General Anesthesia   Patient Is Suitable Candidate For Sedation?     [x  ] Yes   [  ] No   [  ] Not Applicable   Cath Order Entered: [ x ] Yes  DAPT LOAD Ordered: [ x ] Yes   Pre-Cath fluids Ordered: [ x ] Yes Given 250 cc bolus IV x 1     Risks Benefits Annotation:     CARDIAC CATH: Risks & benefits of procedure and sedation and risks and benefits for the alternative therapy have been explained to the patient and/or HCP in layman’s terms including but not limited to: allergic reaction, bleeding, infection, arrhythmia, respiratory compromise, renal and vascular compromise, limb damage, MI, CVA, emergent CABG/Vascular Surgery and death. Informed consent obtained and in chart.

## 2022-02-09 NOTE — PROVIDER CONTACT NOTE (OTHER) - BACKGROUND
Patient c/o of nausea earlier, partially relieved by Zofran. Continues to c/o nausea but states she can wait for more Zofran.

## 2022-02-09 NOTE — PROVIDER CONTACT NOTE (CHANGE IN STATUS NOTIFICATION) - ASSESSMENT
Patient lethargic post ambulation to bathroom, A&OX4, 4L NC sat. 91-93%, SB on the monitor, with complaints of nausea, lightheadedness, and feeling flush.

## 2022-02-09 NOTE — PROVIDER CONTACT NOTE (OTHER) - ACTION/TREATMENT ORDERED:
EKG performed and reviewed by PA, Tylenol administered. Will continue to closely monitor.
KATHY Mcgregor at bedside assessing pt. Currently has no complaints. Monitor blood pressure closely. No interventions at this time.

## 2022-02-09 NOTE — PROGRESS NOTE ADULT - PROBLEM SELECTOR PLAN 1
Patient presenting with constant band-like chest pain, worse with exertion  - Troponin negative x 3; EKG with TWI in V1-V3; CT chest negative PE, no consolidation but + Bilateral reticular opacities consistent with pleural and parenchymal scarring, + small pericardial effusion.  - ECHO 02/08/22: Patient was bradycardia during the study. Aortic sclerosis without significant stenosis. No significant valvular disease.The right ventricle is normal in size. Right ventricular systolic function is normal.The left ventricle cavity size is normal. Left ventricular ejection fraction is 55%. Poor endocardial delineation precludes accurate assessment of regional wall motion. There is probably apical septal akinesis. There is basal to mid inferolateral wall hypokinesis. Trivial pericardial effusion. Consider repeat TTE with imaging solution such as Definity to better enhance the endocardium and evaluate the left ventricular function, if clinically indicated EF: 55%.  - CCTA 2/8/2021: Calcium score is 713, which is in the 98th percentile for age, gender, race. Severe mLAD and probably severe mRCA stenosis. Moderate pLCx. Calcific plaque obscures lumen on small OM branch. Remaining coronary segments appear non-obstructive.   - Finish Aspirin load - Aspirin 163 mg PO x 1 and Plavix 600 mg PO x 1. Patient given 250 cc bolus, will hold off on further fluids due to recent diuresis.   - cath today with Dr. Goel (see checklist above)

## 2022-02-09 NOTE — PROVIDER CONTACT NOTE (CHANGE IN STATUS NOTIFICATION) - SITUATION
Patient ambulated to bathroom to have BM. Now complaining of lightheadedness and "feeling flush". BP soft 81/42

## 2022-02-09 NOTE — PROGRESS NOTE ADULT - PROBLEM SELECTOR PLAN 2
History of asthma, possible SHANKAR or Hypoventilation Syndrome given body habitus and smoking history  - Pulm consulted   - PRN Nebs

## 2022-02-09 NOTE — PROVIDER CONTACT NOTE (OTHER) - RECOMMENDATIONS
Records received and placed on provider's desk for review and sent to scanning.     Urmila HOWELL  Station     
Patient hypotensive earlier, consider holding nitro
Notify PA

## 2022-02-09 NOTE — PROGRESS NOTE ADULT - SUBJECTIVE AND OBJECTIVE BOX
Interventional Cardiology PA Adult Progress Note    C.C.:     Subjective Assessment:      ROS Negative except as per Subjective and HPI  	  MEDICATIONS:      albuterol/ipratropium for Nebulization 3 milliLiter(s) Nebulizer every 6 hours PRN    acetaminophen     Tablet .. 650 milliGRAM(s) Oral every 6 hours PRN    pantoprazole    Tablet 40 milliGRAM(s) Oral before breakfast      aspirin enteric coated 81 milliGRAM(s) Oral daily  enoxaparin Injectable 40 milliGRAM(s) SubCutaneous every 12 hours  influenza   Vaccine 0.5 milliLiter(s) IntraMuscular once      	    [PHYSICAL EXAM:  TELEMETRY:  T(C): 36.7 (02-09-22 @ 05:29), Max: 36.7 (02-08-22 @ 18:49)  HR: 68 (02-09-22 @ 08:43) (51 - 71)  BP: 111/53 (02-09-22 @ 08:43) (82/43 - 112/56)  RR: 18 (02-09-22 @ 08:43) (17 - 18)  SpO2: 93% (02-09-22 @ 08:43) (92% - 100%)  Wt(kg): --  I&O's Summary    08 Feb 2022 07:01  -  09 Feb 2022 07:00  --------------------------------------------------------  IN: 0 mL / OUT: 1350 mL / NET: -1350 mL        Hayes:  Central/PICC/Mid Line:                                         Appearance: Normal	  HEENT:   Normal oral mucosa, PERRL, EOMI	  Neck: Supple, + JVD/ - JVD; Carotid Bruit   Cardiovascular: Normal S1 S2, No JVD, No murmurs,   Respiratory: Lungs clear to auscultation/Decreased Breath Sounds/No Rales, Rhonchi, Wheezing	  Gastrointestinal:  Soft, Non-tender, + BS	  Skin: No rashes, No ecchymoses, No cyanosis  Extremities: Normal range of motion, No clubbing, cyanosis or edema  Vascular: Peripheral pulses palpable 2+ bilaterally  Neurologic: Non-focal  Psychiatry: A & O x 3, Mood & affect appropriate      	    ECG:  	  RADIOLOGY:   DIAGNOSTIC TESTING:  [ ] Echocardiogram:  [ ]  Catheterization:  [ ] Stress Test:    [ ] KURTIS  OTHER: 	    LABS:	 	  CARDIAC MARKERS:                                  14.1   8.60  )-----------( 306      ( 09 Feb 2022 05:57 )             48.9     02-09    143  |  98  |  25<H>  ----------------------------<  96  4.2   |  35<H>  |  0.96    Ca    9.2      09 Feb 2022 05:57  Mg     2.8     02-09    TPro  6.2  /  Alb  3.4  /  TBili  0.5  /  DBili  x   /  AST  14  /  ALT  11  /  AlkPhos  86  02-09    proBNP:   Lipid Profile:   HgA1c:   TSH:       ASSESSMENT/PLAN: 	        DVT ppx:  Dispo:     Interventional Cardiology PA Adult Progress Note    Subjective Assessment: Patient seen and examined at bedside. Patient had an episode of hypotension after using the bathroom, feels flush, dizzy. Patient denies CP, palpitations, or SOB.     ROS Negative except as per Subjective and HPI  	  MEDICATIONS:  albuterol/ipratropium for Nebulization 3 milliLiter(s) Nebulizer every 6 hours PRN  acetaminophen     Tablet .. 650 milliGRAM(s) Oral every 6 hours PRN  pantoprazole    Tablet 40 milliGRAM(s) Oral before breakfast  aspirin enteric coated 81 milliGRAM(s) Oral daily  enoxaparin Injectable 40 milliGRAM(s) SubCutaneous every 12 hours  influenza   Vaccine 0.5 milliLiter(s) IntraMuscular once      [PHYSICAL EXAM:  TELEMETRY:  T(C): 36.7 (02-09-22 @ 05:29), Max: 36.7 (02-08-22 @ 18:49)  HR: 68 (02-09-22 @ 08:43) (51 - 71)  BP: 111/53 (02-09-22 @ 08:43) (82/43 - 112/56)  RR: 18 (02-09-22 @ 08:43) (17 - 18)  SpO2: 93% (02-09-22 @ 08:43) (92% - 100%)  Wt(kg): --  I&O's Summary    08 Feb 2022 07:01  -  09 Feb 2022 07:00  --------------------------------------------------------  IN: 0 mL / OUT: 1350 mL / NET: -1350 mL                           Appearance: Normal, sitting comfortably in bed   HEENT:   Normal oral mucosa, PERRL, EOMI	  Neck: Supple, - JVD;  No Carotid Bruit   Cardiovascular: Normal S1 S2, No JVD, No murmurs,   Respiratory: +Scattered Wheezes 	  Gastrointestinal:  Soft, Non-tender, + BS	  Skin: No rashes, No ecchymoses, No cyanosis  Extremities: Normal range of motion, No clubbing, cyanosis or edema  Vascular: Radial pulses 2+, PT: 1+ B/L, DP: 1+ B/L   Neurologic: Non-focal  Psychiatry: A & O x 3, Mood & affect appropriate  	    ECG:  	  RADIOLOGY:     DIAGNOSTIC TESTING:  [x ] Echocardiogram:   1. Patient was bradycardic during the study.   2. Aortic sclerosis without significant stenosis.   3. No significant valvular disease.   4. The right ventricle is normal in size. Right ventricular systolic   function is normal.   5. The left ventricle cavity size is normal. Left ventricular ejection   fraction is 55%. Poor endocardial delineation precludes accurate   assessment of regional wall motion. There is probably apical septal   akinesis. There is basal to mid inferolateral wall hypokinesis.   6. Trivial pericardial effusion.   7. Consider repeat TTE with imaging solution such as Definity to better   enhance the endocardium and evaluate the left ventricular function, if   clinically indicated.    LABS:	 	  CARDIAC MARKERS: 0.01 x 3                        14.1   8.60  )-----------( 306      ( 09 Feb 2022 05:57 )             48.9     02-09    143  |  98  |  25<H>  ----------------------------<  96  4.2   |  35<H>  |  0.96    Ca    9.2      09 Feb 2022 05:57  Mg     2.8     02-09    TPro  6.2  /  Alb  3.4  /  TBili  0.5  /  DBili  x   /  AST  14  /  ALT  11  /  AlkPhos  86  02-09    proBNP: 2165

## 2022-02-09 NOTE — PROVIDER CONTACT NOTE (CHANGE IN STATUS NOTIFICATION) - ACTION/TREATMENT ORDERED:
KATHY Beaver assessed at bedside. Administered IV Zofran, initiated fluids bolus, placed patient on bedrest. Placed ice pack on patient's head. Will continue to monitor closely.

## 2022-02-09 NOTE — PROGRESS NOTE ADULT - ATTENDING COMMENTS
61F, morbidly obese, smoker, h/o Asthma, HTN and famHx of CAD p/w SOB w/ minimal exertion, LE swelling admitted for possible new-onset CHF and further cardiac w/u    -CHF - Pt. significantly obese, difficult to assess JVD, no LE edema; s/p TTE: Normal LVEF 55%, w/ possible regional WMA - Inferoseptal hypokinesis and ?Apical hypokinesis, No significant valvular disease, Normal Atria, Normal RV function, PASP ~22mmHg - Poor endocardial resolution; s/p CCTA: Severe mLAD stenosis and likely severe mRCA stenosis - Plan for [facility] today; Currently CP free, c/w ASA/Lipitor; Soft BPs  - no room for BB/ARB  -Pulm - h/o Asthma, possible SHANKAR and/or Obstructive lung disease given smoking history; CTA Chest w/ no evidence of PE; Obtain Pulm consult; Will likely need outpatient Sleep study and PFTs; c/w PRN Nebs  -DASH diet  -DVT PPx  -Dispo: Cardiac Tele    Norah Jonas MD  Cardiology Attending

## 2022-02-09 NOTE — PROGRESS NOTE ADULT - PROBLEM SELECTOR PLAN 3
Patient recently started on metoprolol succinate 25 mg and losartan-hydrochlorothiazide 160 mg-25 mg oral tablet  - Patient's HR has been in 50s and BP softer side; will hold off on antihypertensives for now       F: 250 cc IV Bolus x 1   E: K > 4 , Mg > 2  N: NPO for cath   Dvt: holding for cath   GI: Pantoprazole  Dispo: pending cath     Case discussed with Dr. Jonas

## 2022-02-10 ENCOUNTER — TRANSCRIPTION ENCOUNTER (OUTPATIENT)
Age: 62
End: 2022-02-10

## 2022-02-10 VITALS — TEMPERATURE: 99 F

## 2022-02-10 PROBLEM — J44.9 CHRONIC OBSTRUCTIVE PULMONARY DISEASE, UNSPECIFIED: Chronic | Status: ACTIVE | Noted: 2022-02-08

## 2022-02-10 PROBLEM — I10 ESSENTIAL (PRIMARY) HYPERTENSION: Chronic | Status: ACTIVE | Noted: 2022-02-08

## 2022-02-10 LAB
A1C WITH ESTIMATED AVERAGE GLUCOSE RESULT: 5.8 % — HIGH (ref 4–5.6)
ALBUMIN SERPL ELPH-MCNC: 3 G/DL — LOW (ref 3.3–5)
ALP SERPL-CCNC: 76 U/L — SIGNIFICANT CHANGE UP (ref 40–120)
ALT FLD-CCNC: 10 U/L — SIGNIFICANT CHANGE UP (ref 10–45)
ANION GAP SERPL CALC-SCNC: 6 MMOL/L — SIGNIFICANT CHANGE UP (ref 5–17)
AST SERPL-CCNC: 17 U/L — SIGNIFICANT CHANGE UP (ref 10–40)
BASOPHILS # BLD AUTO: 0.04 K/UL — SIGNIFICANT CHANGE UP (ref 0–0.2)
BASOPHILS NFR BLD AUTO: 0.4 % — SIGNIFICANT CHANGE UP (ref 0–2)
BILIRUB SERPL-MCNC: 0.5 MG/DL — SIGNIFICANT CHANGE UP (ref 0.2–1.2)
BUN SERPL-MCNC: 22 MG/DL — SIGNIFICANT CHANGE UP (ref 7–23)
CALCIUM SERPL-MCNC: 8.8 MG/DL — SIGNIFICANT CHANGE UP (ref 8.4–10.5)
CHLORIDE SERPL-SCNC: 97 MMOL/L — SIGNIFICANT CHANGE UP (ref 96–108)
CHOLEST SERPL-MCNC: 184 MG/DL — SIGNIFICANT CHANGE UP
CO2 SERPL-SCNC: 35 MMOL/L — HIGH (ref 22–31)
CREAT SERPL-MCNC: 0.8 MG/DL — SIGNIFICANT CHANGE UP (ref 0.5–1.3)
EOSINOPHIL # BLD AUTO: 0.12 K/UL — SIGNIFICANT CHANGE UP (ref 0–0.5)
EOSINOPHIL NFR BLD AUTO: 1.2 % — SIGNIFICANT CHANGE UP (ref 0–6)
ESTIMATED AVERAGE GLUCOSE: 120 MG/DL — HIGH (ref 68–114)
GLUCOSE SERPL-MCNC: 107 MG/DL — HIGH (ref 70–99)
HCT VFR BLD CALC: 46.1 % — HIGH (ref 34.5–45)
HDLC SERPL-MCNC: 37 MG/DL — LOW
HGB BLD-MCNC: 13.4 G/DL — SIGNIFICANT CHANGE UP (ref 11.5–15.5)
IMM GRANULOCYTES NFR BLD AUTO: 0.5 % — SIGNIFICANT CHANGE UP (ref 0–1.5)
LIPID PNL WITH DIRECT LDL SERPL: 124 MG/DL — HIGH
LYMPHOCYTES # BLD AUTO: 1.59 K/UL — SIGNIFICANT CHANGE UP (ref 1–3.3)
LYMPHOCYTES # BLD AUTO: 16 % — SIGNIFICANT CHANGE UP (ref 13–44)
MAGNESIUM SERPL-MCNC: 2.4 MG/DL — SIGNIFICANT CHANGE UP (ref 1.6–2.6)
MCHC RBC-ENTMCNC: 26.2 PG — LOW (ref 27–34)
MCHC RBC-ENTMCNC: 29.1 GM/DL — LOW (ref 32–36)
MCV RBC AUTO: 90 FL — SIGNIFICANT CHANGE UP (ref 80–100)
MONOCYTES # BLD AUTO: 1.14 K/UL — HIGH (ref 0–0.9)
MONOCYTES NFR BLD AUTO: 11.5 % — SIGNIFICANT CHANGE UP (ref 2–14)
NEUTROPHILS # BLD AUTO: 6.98 K/UL — SIGNIFICANT CHANGE UP (ref 1.8–7.4)
NEUTROPHILS NFR BLD AUTO: 70.4 % — SIGNIFICANT CHANGE UP (ref 43–77)
NON HDL CHOLESTEROL: 147 MG/DL — HIGH
NRBC # BLD: 0 /100 WBCS — SIGNIFICANT CHANGE UP (ref 0–0)
PLATELET # BLD AUTO: 284 K/UL — SIGNIFICANT CHANGE UP (ref 150–400)
POTASSIUM SERPL-MCNC: 3.5 MMOL/L — SIGNIFICANT CHANGE UP (ref 3.5–5.3)
POTASSIUM SERPL-SCNC: 3.5 MMOL/L — SIGNIFICANT CHANGE UP (ref 3.5–5.3)
PROT SERPL-MCNC: 5.8 G/DL — LOW (ref 6–8.3)
RBC # BLD: 5.12 M/UL — SIGNIFICANT CHANGE UP (ref 3.8–5.2)
RBC # FLD: 15.9 % — HIGH (ref 10.3–14.5)
SODIUM SERPL-SCNC: 138 MMOL/L — SIGNIFICANT CHANGE UP (ref 135–145)
TRIGL SERPL-MCNC: 113 MG/DL — SIGNIFICANT CHANGE UP
WBC # BLD: 9.92 K/UL — SIGNIFICANT CHANGE UP (ref 3.8–10.5)
WBC # FLD AUTO: 9.92 K/UL — SIGNIFICANT CHANGE UP (ref 3.8–10.5)

## 2022-02-10 PROCEDURE — 99239 HOSP IP/OBS DSCHRG MGMT >30: CPT

## 2022-02-10 PROCEDURE — 75574 CT ANGIO HRT W/3D IMAGE: CPT

## 2022-02-10 PROCEDURE — 71275 CT ANGIOGRAPHY CHEST: CPT | Mod: MA

## 2022-02-10 PROCEDURE — 82330 ASSAY OF CALCIUM: CPT

## 2022-02-10 PROCEDURE — C1874: CPT

## 2022-02-10 PROCEDURE — 82550 ASSAY OF CK (CPK): CPT

## 2022-02-10 PROCEDURE — 94640 AIRWAY INHALATION TREATMENT: CPT

## 2022-02-10 PROCEDURE — 82803 BLOOD GASES ANY COMBINATION: CPT

## 2022-02-10 PROCEDURE — 84132 ASSAY OF SERUM POTASSIUM: CPT

## 2022-02-10 PROCEDURE — 86803 HEPATITIS C AB TEST: CPT

## 2022-02-10 PROCEDURE — 85025 COMPLETE CBC W/AUTO DIFF WBC: CPT

## 2022-02-10 PROCEDURE — C1894: CPT

## 2022-02-10 PROCEDURE — 99232 SBSQ HOSP IP/OBS MODERATE 35: CPT | Mod: GC

## 2022-02-10 PROCEDURE — 84484 ASSAY OF TROPONIN QUANT: CPT

## 2022-02-10 PROCEDURE — 99285 EMERGENCY DEPT VISIT HI MDM: CPT | Mod: 25

## 2022-02-10 PROCEDURE — 83735 ASSAY OF MAGNESIUM: CPT

## 2022-02-10 PROCEDURE — 80053 COMPREHEN METABOLIC PANEL: CPT

## 2022-02-10 PROCEDURE — 87637 SARSCOV2&INF A&B&RSV AMP PRB: CPT

## 2022-02-10 PROCEDURE — 83880 ASSAY OF NATRIURETIC PEPTIDE: CPT

## 2022-02-10 PROCEDURE — 80048 BASIC METABOLIC PNL TOTAL CA: CPT

## 2022-02-10 PROCEDURE — C1760: CPT

## 2022-02-10 PROCEDURE — 83036 HEMOGLOBIN GLYCOSYLATED A1C: CPT

## 2022-02-10 PROCEDURE — C1887: CPT

## 2022-02-10 PROCEDURE — 93005 ELECTROCARDIOGRAM TRACING: CPT

## 2022-02-10 PROCEDURE — 83690 ASSAY OF LIPASE: CPT

## 2022-02-10 PROCEDURE — 93971 EXTREMITY STUDY: CPT

## 2022-02-10 PROCEDURE — 82553 CREATINE MB FRACTION: CPT

## 2022-02-10 PROCEDURE — 84295 ASSAY OF SERUM SODIUM: CPT

## 2022-02-10 PROCEDURE — C1769: CPT

## 2022-02-10 PROCEDURE — 80061 LIPID PANEL: CPT

## 2022-02-10 PROCEDURE — C1753: CPT

## 2022-02-10 PROCEDURE — C1889: CPT

## 2022-02-10 PROCEDURE — 85027 COMPLETE CBC AUTOMATED: CPT

## 2022-02-10 PROCEDURE — 93306 TTE W/DOPPLER COMPLETE: CPT

## 2022-02-10 PROCEDURE — 82962 GLUCOSE BLOOD TEST: CPT

## 2022-02-10 PROCEDURE — C1725: CPT

## 2022-02-10 PROCEDURE — 36415 COLL VENOUS BLD VENIPUNCTURE: CPT

## 2022-02-10 RX ORDER — POTASSIUM CHLORIDE 20 MEQ
40 PACKET (EA) ORAL ONCE
Refills: 0 | Status: DISCONTINUED | OUTPATIENT
Start: 2022-02-10 | End: 2022-02-10

## 2022-02-10 RX ORDER — POTASSIUM CHLORIDE 20 MEQ
40 PACKET (EA) ORAL ONCE
Refills: 0 | Status: COMPLETED | OUTPATIENT
Start: 2022-02-10 | End: 2022-02-10

## 2022-02-10 RX ORDER — PANTOPRAZOLE SODIUM 20 MG/1
1 TABLET, DELAYED RELEASE ORAL
Qty: 30 | Refills: 2
Start: 2022-02-10 | End: 2022-05-10

## 2022-02-10 RX ORDER — ASPIRIN/CALCIUM CARB/MAGNESIUM 324 MG
1 TABLET ORAL
Qty: 30 | Refills: 11
Start: 2022-02-10 | End: 2023-02-04

## 2022-02-10 RX ORDER — SODIUM CHLORIDE 9 MG/ML
250 INJECTION INTRAMUSCULAR; INTRAVENOUS; SUBCUTANEOUS ONCE
Refills: 0 | Status: COMPLETED | OUTPATIENT
Start: 2022-02-10 | End: 2022-02-10

## 2022-02-10 RX ORDER — CLOPIDOGREL BISULFATE 75 MG/1
75 TABLET, FILM COATED ORAL DAILY
Refills: 0 | Status: DISCONTINUED | OUTPATIENT
Start: 2022-02-10 | End: 2022-02-10

## 2022-02-10 RX ORDER — ATORVASTATIN CALCIUM 80 MG/1
40 TABLET, FILM COATED ORAL AT BEDTIME
Refills: 0 | Status: DISCONTINUED | OUTPATIENT
Start: 2022-02-10 | End: 2022-02-10

## 2022-02-10 RX ORDER — METOPROLOL TARTRATE 50 MG
1 TABLET ORAL
Qty: 0 | Refills: 0 | DISCHARGE

## 2022-02-10 RX ORDER — ATORVASTATIN CALCIUM 80 MG/1
1 TABLET, FILM COATED ORAL
Qty: 30 | Refills: 2
Start: 2022-02-10 | End: 2022-05-10

## 2022-02-10 RX ORDER — CLOPIDOGREL BISULFATE 75 MG/1
1 TABLET, FILM COATED ORAL
Qty: 30 | Refills: 11
Start: 2022-02-10 | End: 2023-02-04

## 2022-02-10 RX ORDER — METOPROLOL TARTRATE 50 MG
0.5 TABLET ORAL
Qty: 15 | Refills: 2
Start: 2022-02-10 | End: 2022-05-10

## 2022-02-10 RX ADMIN — Medication 81 MILLIGRAM(S): at 11:40

## 2022-02-10 RX ADMIN — Medication 650 MILLIGRAM(S): at 09:48

## 2022-02-10 RX ADMIN — Medication 650 MILLIGRAM(S): at 08:48

## 2022-02-10 RX ADMIN — Medication 40 MILLIEQUIVALENT(S): at 10:22

## 2022-02-10 RX ADMIN — CLOPIDOGREL BISULFATE 75 MILLIGRAM(S): 75 TABLET, FILM COATED ORAL at 11:40

## 2022-02-10 RX ADMIN — PANTOPRAZOLE SODIUM 40 MILLIGRAM(S): 20 TABLET, DELAYED RELEASE ORAL at 05:23

## 2022-02-10 RX ADMIN — Medication 3 MILLILITER(S): at 05:19

## 2022-02-10 RX ADMIN — SODIUM CHLORIDE 500 MILLILITER(S): 9 INJECTION INTRAMUSCULAR; INTRAVENOUS; SUBCUTANEOUS at 11:40

## 2022-02-10 NOTE — DISCHARGE NOTE PROVIDER - NSDCFUADDAPPT_GEN_ALL_CORE_FT
You must return in 4-5 weeks to fix the residual blockage. Please set an appointment to return when you follow up with your cardiologist Dr. Cm. Dr. Goel's other office  [address] [place], [place] [address] [phone]

## 2022-02-10 NOTE — DIETITIAN NUTRITION RISK NOTIFICATION - TREATMENT: THE FOLLOWING DIET HAS BEEN RECOMMENDED
Diet, DASH/TLC:   Sodium & Cholesterol Restricted  1500mL Fluid Restriction ([id number]) (02-09-22 @ 09:25) [Active]

## 2022-02-10 NOTE — DISCHARGE NOTE PROVIDER - DETAILS OF MALNUTRITION DIAGNOSIS/DIAGNOSES
This patient has been assessed with a concern for Malnutrition and was treated during this hospitalization for the following Nutrition diagnosis/diagnoses:     -  02/10/2022: Morbid obesity (BMI > 40)

## 2022-02-10 NOTE — DISCHARGE NOTE PROVIDER - HOSPITAL COURSE
**INCOMPLETE***    61Y F, recent smoker (quit 2 weeks ago), w/ FHx early MI and PMHx HTN and Asthma, presented to [facility] ED c/o CP, progressive MAIER, 2 pillow orthopnea and LE edema, and pt admitted to cardiac telemetry for further management and R/O ACS. Pt required Lasx 20mg IVP x 1 for B/L reticualr opacities seen on POCUS. Trop neg x 3, EKG NSR w/ TWI in V1-3, Echo ___________. CCTA _____________. Pt now s/p impella assisted cardiac cath 2/9/22: NATHANAEL x 2 pLAD (99%) w/ residual mRCA 99%, dLCx 40%, EDP 28, EF 35-40%, access R radial TR band and R groin PC. Pt to return in 4-5 weeks for staged PCI of residual RCA disease. Pt seen and examined at bedside this AM without any complaints or events overnight, VSS, labs and telemetry reviewed and pt stable for discharge as discussed with Dr. Jonas. Pt has received appropriate discharge instructions, including medication regimen, access site management and follow up with Dr. Cm in 1-2 weeks.    Discharge medications: ASA 81mg QD, Plavix 75mg QD, Lipitor 40mg HS, and ______.    Cardiac Rehab (Post PCI):  Education on benefits of Cardiac Rehab provided to patient, Referral and Prescription Given for Cardiac Rehab, Pt given list of locations & instructed to contact their insurance company to review list of participating providers   **INCOMPLETE***    61Y F, recent smoker (quit 2 weeks ago), w/ FHx early MI and PMHx HTN and Asthma, presented to [facility] ED c/o CP, progressive MAIER, 2 pillow orthopnea and LE edema, and pt admitted to cardiac telemetry for further management and R/O ACS. Trop neg x 3, EKG NSR w/ TWI in V1-3, no events on telemetry, LE Duplex negative for DVT, CTA PE protocol negative for PE. Echo: LVEF 55% w/ probable apical septal akinesis, there is basal to mid inferolateral hypokinesis. CCTA: Ca 713, severe mLAD and probable severe mRCA stenosis, mod pLCx stenosis, calcific plaque obscures lumen of small OM, remaining non obstructive. Pt now s/p impella assisted cardiac cath 2/9/22: NATHANAEL x 2 pLAD (99%) w/ residual mRCA 99%, dLCx 40%, EDP 28, EF 35-40%, access R radial TR band and R groin PC. Pt to return in 4-5 weeks for staged PCI of residual RCA disease. On admit BNP 2165 and POCUS revealed B/L reticular opacities and pt received Lasix 20mg IVP x 1. Pt seen and examined at bedside this AM without any complaints or events overnight, VSS, labs and telemetry reviewed and pt stable for discharge as discussed with Dr. Jonas. Pt has received appropriate discharge instructions, including medication regimen, access site management and follow up with Dr. Goel in 1-2 weeks.    Discharge medications: ASA 81mg QD, Plavix 75mg QD, Lipitor 40mg HS, and ______.    Cardiac Rehab (Post PCI):  Education on benefits of Cardiac Rehab provided to patient, Referral and Prescription Given for Cardiac Rehab, Pt given list of locations & instructed to contact their insurance company to review list of participating providers     61Y F, recent smoker (quit 2 weeks ago), w/ FHx early MI and PMHx HTN and Asthma, presented to [facility] ED c/o CP, progressive MAIER, 2 pillow orthopnea and LE edema, and pt admitted to cardiac telemetry for further management and R/O ACS. Trop neg x 3, EKG NSR w/ TWI in V1-3, no events on telemetry, LE Duplex negative for DVT, CTA PE protocol negative for PE. Echo: LVEF 55% w/ probable apical septal akinesis, there is basal to mid inferolateral hypokinesis. CCTA: Ca 713, severe mLAD and probable severe mRCA stenosis, mod pLCx stenosis, calcific plaque obscures lumen of small OM, remaining non obstructive. Pt now s/p impella assisted cardiac cath 2/9/22: NATHANAEL x 2 pLAD (99%) w/ residual mRCA 99%, dLCx 40%, EDP 28, EF 35-40%, access R radial TR band and R groin PC. Pt to return in 4-5 weeks for staged PCI of residual RCA disease. On admit BNP 2165 and POCUS revealed B/L reticular opacities and pt received Lasix 20mg IVP x 1. Pt seen and examined at bedside this AM without any complaints or events overnight, VSS, labs and telemetry reviewed and pt stable for discharge as discussed with Dr. Jonas. Pt has received appropriate discharge instructions, including medication regimen, access site management and follow up with Dr. Goel in 1-2 weeks.    Discharge medications: ASA 81mg QD, Plavix 75mg QD, Lipitor 40mg HS, and Toprol 12.5mg QD.    Cardiac Rehab (Post PCI):  Education on benefits of Cardiac Rehab provided to patient, Referral and Prescription Given for Cardiac Rehab, Pt given list of locations & instructed to contact their insurance company to review list of participating providers     61Y F, recent smoker (quit 2 weeks ago), w/ FHx early MI and PMHx HTN and Asthma, presented to [facility] ED c/o CP, progressive MAIER, 2 pillow orthopnea and LE edema, and pt admitted to cardiac telemetry for further management and R/O ACS. Trop neg x 3, EKG NSR w/ TWI in V1-3, no events on telemetry, LE Duplex negative for DVT, CTA PE protocol negative for PE. Echo: LVEF 55% w/ probable apical septal akinesis, there is basal to mid inferolateral hypokinesis. CCTA: Ca 713, severe mLAD and probable severe mRCA stenosis, mod pLCx stenosis, calcific plaque obscures lumen of small OM, remaining non obstructive. Pt now s/p impella assisted cardiac cath 2/9/22: NATHANAEL x 2 pLAD (99%) w/ residual mRCA 99%, dLCx 40%, EDP 28, EF 35-40%, access R radial TR band and R groin PC. Pt to return in 4-5 weeks for staged PCI of residual RCA disease. On admit BNP 2165 and POCUS revealed B/L reticular opacities and pt received Lasix 20mg IVP x 1. Pt seen and examined at bedside this AM without any complaints or events overnight, VSS, labs and telemetry reviewed and pt stable for discharge as discussed with Dr. Jonas. Pt has received appropriate discharge instructions, including medication regimen, access site management and follow up with Dr. Goel on    Discharge medications: ASA 81mg QD, Plavix 75mg QD, Lipitor 40mg HS, and Toprol 12.5mg QD.    Cardiac Rehab (Post PCI):  Education on benefits of Cardiac Rehab provided to patient, Referral and Prescription Given for Cardiac Rehab, Pt given list of locations & instructed to contact their insurance company to review list of participating providers     61Y F, recent smoker (quit 2 weeks ago), w/ FHx early MI and PMHx HTN and Asthma, presented to [facility] ED c/o CP, progressive MAIER, 2 pillow orthopnea and LE edema, and pt admitted to cardiac telemetry for further management and R/O ACS. Trop neg x 3, EKG NSR w/ TWI in V1-3, no events on telemetry, LE Duplex negative for DVT, CTA PE protocol negative for PE. Echo: LVEF 55% w/ probable apical septal akinesis, there is basal to mid inferolateral hypokinesis. CCTA: Ca 713, severe mLAD and probable severe mRCA stenosis, mod pLCx stenosis, calcific plaque obscures lumen of small OM, remaining non obstructive. Pt now s/p impella assisted cardiac cath 2/9/22: NATHANAEL x 2 pLAD (99%) w/ residual mRCA 99%, dLCx 40%, EDP 28, EF 35-40%, access R radial TR band and R groin PC. Pt to return in 4-5 weeks for staged PCI of residual RCA disease. On admit BNP 2165 and POCUS revealed B/L reticular opacities and pt received Lasix 20mg IVP x 1. Pulm consulted and plan for outpt sleep study and further COPD w/u. Pt seen and examined at bedside this AM without any complaints or events overnight, VSS, labs and telemetry reviewed and pt stable for discharge as discussed with Dr. Jonas. Pt has received appropriate discharge instructions, including medication regimen, access site management and follow up with Dr. Goel on ____, and Dr. Carroll on ___.    Discharge medications: ASA 81mg QD, Plavix 75mg QD, Lipitor 40mg HS, and Toprol 12.5mg QD.    Cardiac Rehab (Post PCI):  Education on benefits of Cardiac Rehab provided to patient, Referral and Prescription Given for Cardiac Rehab, Pt given list of locations & instructed to contact their insurance company to review list of participating providers     61Y F, recent smoker (quit 2 weeks ago), w/ FHx early MI and PMHx HTN and Asthma, presented to [facility] ED c/o CP, progressive MAIER, 2 pillow orthopnea and LE edema, and pt admitted to cardiac telemetry for further management and R/O ACS. Trop neg x 3, EKG NSR w/ TWI in V1-3, no events on telemetry, LE Duplex negative for DVT, CTA PE protocol negative for PE. Echo: LVEF 55% w/ probable apical septal akinesis, there is basal to mid inferolateral hypokinesis. CCTA: Ca 713, severe mLAD and probable severe mRCA stenosis, mod pLCx stenosis, calcific plaque obscures lumen of small OM, remaining non obstructive. Pt now s/p impella assisted cardiac cath 2/9/22: NATHANAEL x 2 pLAD (99%) w/ residual mRCA 99%, dLCx 40%, EDP 28, EF 35-40%, access R radial TR band and R groin PC. Pt to return in 4-5 weeks for staged PCI of residual RCA disease. On admit BNP 2165 and POCUS revealed B/L reticular opacities and pt received Lasix 20mg IVP x 1. Pulm consulted and plan for outpt sleep study and further COPD w/u. Pt seen and examined at bedside this AM without any complaints or events overnight, VSS, labs and telemetry reviewed and pt stable for discharge as discussed with Dr. Jonas. Pt has received appropriate discharge instructions, including medication regimen, access site management and follow up with Dr. Goel on 3/3/22 and Dr. Carroll on 2/28/22.    Discharge medications: ASA 81mg QD, Plavix 75mg QD, Lipitor 40mg HS, and Toprol 12.5mg QD.    Cardiac Rehab (Post PCI):  Education on benefits of Cardiac Rehab provided to patient, Referral and Prescription Given for Cardiac Rehab, Pt given list of locations & instructed to contact their insurance company to review list of participating providers

## 2022-02-10 NOTE — DISCHARGE NOTE PROVIDER - CARE PROVIDER_API CALL
Felicitas Cm)  Cardiology; Interventional Cardiology  [address] [place] [address]  Phone: [phone]  Fax: [phone]  Follow Up Time: 1 week   Philip Goel)  Cardiovascular Disease; Internal Medicine; Interventional Cardiology  [address] [place] [address], [address] [place] [address]  Phone: [phone]  Fax: [phone]  Follow Up Time: 1 week   Philip Goel)  Cardiovascular Disease; Internal Medicine; Interventional Cardiology  [address] [place] [address], [address]  [place] [address]  Phone: [phone]  Fax: [phone]  Follow Up Time: 1 week    Frieda Carroll)  Internal Medicine; Pulmonary Disease  [address] [address]  Phone: [phone]  Fax: [phone]  Follow Up Time: 1 month   Frieda Carroll)  Internal Medicine; Pulmonary Disease  [address] [address]  Phone: [phone]  Fax: [phone]  Scheduled Appointment: 02/28/2022 10:00 AM    Philip Goel  [address] [place] [address] [address]  [place] [address]  Phone: [phone]  Fax: (   )    -  Scheduled Appointment: 03/03/2022 03:30 PM

## 2022-02-10 NOTE — CHART NOTE - NSCHARTNOTEFT_GEN_A_CORE
Admitting Diagnosis:   Patient is a 61y old  Female who presents with a chief complaint of SOB and chest pain x > month (10 Feb 2022 10:17)      PAST MEDICAL & SURGICAL HISTORY:  HTN (hypertension)    Chronic obstructive pulmonary disease (COPD)    S/P cholecystectomy    H/O tear of meniscus of knee joint        Current Nutrition Order:  DASH TLC 1500ml Fluid Restriction     PO Intake: Good (%) [   ]  Fair (50-75%) [ x  ] Poor (<25%) [   ]    GI Issues:   Denies N/V, BM+2/8    Pain:  none per flow sheets     Skin Integrity:  No pressure ulcers  1+BL Ankle foot edema  [id number]     Labs:   02-10    138  |  97  |  22  ----------------------------<  107<H>  3.5   |  35<H>  |  0.80    Ca    8.8      10 Feb 2022 06:17  Mg     2.4     02-10    TPro  5.8<L>  /  Alb  3.0<L>  /  TBili  0.5  /  DBili  x   /  AST  17  /  ALT  10  /  AlkPhos  76  02-10    CAPILLARY BLOOD GLUCOSE      POCT Blood Glucose.: 204 mg/dL (09 Feb 2022 11:44)      Medications:  MEDICATIONS  (STANDING):  aspirin enteric coated 81 milliGRAM(s) Oral daily  atorvastatin 40 milliGRAM(s) Oral at bedtime  clopidogrel Tablet 75 milliGRAM(s) Oral daily  influenza   Vaccine 0.5 milliLiter(s) IntraMuscular once  pantoprazole    Tablet 40 milliGRAM(s) Oral before breakfast    MEDICATIONS  (PRN):  acetaminophen     Tablet .. 650 milliGRAM(s) Oral every 6 hours PRN Moderate Pain (4 - 6)  albuterol/ipratropium for Nebulization 3 milliLiter(s) Nebulizer every 6 hours PRN Shortness of Breath and/or Wheezing       Weight Assessment:  · Height for BMI (FEET)	5 Feet  · Height for BMI (INCHES)	2 Inch(s)  · Height for BMI (CENTIMETERS)	157.48 Centimeter(s)  · Weight for BMI (lbs)	250 lb  · Weight for BMI (kg)	113.4 kg  · Body Mass Index	45.7  · Ideal Body Weight (lbs)	110  · Ideal Body Weight (kg)	49.8      Weight Change:   2/9 236.9 pounds   >> Reports wt gain d/t being less active during COVID however feels wt decrease d/t recent decreased PO intake.  pounds, had gone up to 250 pounds. Pt reports being told wt of 235 pounds in ED. Bedscale wt to 2/8 Of 242 pounds which would suggest wt stable however does remain with 1+Gen edema which may be masking wt loss.     Estimated energy needs:   IBW used to calculate energy needs 2/2 current body weight exceeding 120% of IBW ([id number]%)  EER adjusted for age/BMI; Fluids per team      Estimated Energy Needs:  · Weight (lbs)	110 lb  · Weight (kg)	49.8 kg  · Enter From (vinh/kg)	25  · Enter To (vinh/kg)	30  · Calculated From (vinh/kg)	1245  · Calculated To (vinh/kg)	1494     Estimated Protein Needs:  · Weight (lbs)	110 lb  · Weight (kg)	49.8 kg  · Enter From (g/kg)	1.2  · Enter To (g/kg)	1.4  · Calculated From (g/kg)	59.76  · Calculated To (g/kg)	69.72    Subjective: 61yoF, PMHx asthma -no home oxygen, HTN who presented to the ER with 1 month of progressive SOB on minimal exertion, 2 pillow orthopnea, MELQUIADES edema, palpitations and chest pain. Pt was ordered for Lasix 20 mg in the ER but held off due to hypotension 96/61. She is admitted for further management of SOB. Pt admitted to cardiology/telemetry for further workup of chest pain, r/o ACS. Pt underwent bedside US in ED, which showed bilateral reticular opacities and b lines. ECHO 2/8 EF 55%. Pt started on Lasix 20 mg IV BID, now euvolemic, CCTA positive for mLAD and RCA disease, Calcium score is 713. Pt to undergo cardiac cath 2/9.    Pt seen, alert in room. PTA reports not eating well in setting of not feeling good x3 weeks. Would eat things like toast, english muffin. Prior to this episode pt reports trying to watch diet, however feels she could have better portion control. Today Consumed ~50-75% of breakfast this AM. Reports PO intake is improving. Denies N/V today thus far, +BM 2/8.   Labs: A1c 5.8%, POCT 204, Glucose 107, HDL 37, NON , .   Please see RD Recs below.     Prior PES: Inadequate energy intake Etiology RT intake<EER Signs/Symptoms AEB NPO.   >> NA    New PES: Overweight, Obesity RT presumed intake>EER AEB BMI 49.8  Goal/Expected Outcome Diet: pt will have safe gradual wt loss upon d/c of 0.5-1.0 pounds/week to reach IBW     Recommendations:  1. Cont with Diet.  2. Monitor %PO intake, Diet tolerance.  3. Monitor Skin, Wts, GI, Pain.  4. Labs: monitor BMP, CBC, glucose, lytes, trend renal indices.  5. Pt would benefit from further Medical Nutrition Therapy after discharge. Recommend to follow up with [facility] for continuity of care. Email [email or web address] or call [phone].   >> RD provided pt with Phone number.  6. RD to remain available for additional nutrition interventions as needed.     Education: DASH Diet education reviewed. Able to call back [employer] back [employer] from education from RD initial assessment. understanding stated, provide additional motivation as needed.     Risk Level: High [   ] Moderate [x   ] Low [   ]

## 2022-02-10 NOTE — DISCHARGE NOTE NURSING/CASE MANAGEMENT/SOCIAL WORK - PATIENT PORTAL LINK FT
You can access the FollowMyHealth Patient Portal offered by [facility] by registering at the following website: [email or web address]. By joining [employer]’s FollowMyHealth portal, you will also be able to view your health information using other applications (apps) compatible with our system.

## 2022-02-10 NOTE — CONSULT NOTE ADULT - ASSESSMENT
61 year old female presenting with edema, SOB, orthopnea. Found to have abnormal coronary CT and taken for stent placement with near complete resolution of SOB. Pulmonary team called due to hx of SHANKAR and possible asthma. Patient on room air with hypoventilation at time of examination.     SHANKAR  Asthma  Former smoker    Patient had saturation of about 86-88 at time of examination and able to be increased to 93-94 with hyperventilation so likely obesity hypoventilation. She also meets criteria with BMI over 35 and bicarb of 35 on admission indicating chronic hypercapnia. Patient also had documented SHANKAR with sleep study 20 years ago but unable to tolerate CPAP. Given this would advise that she follow up outpatient with home sleep study and evaluation of severity and discuss treatment options. She has no sleep symptoms other then some hypersomnolence. No hx of asthma present as she does not wheeze, no airway hyperactivity, and no steroid usage indicating exacerbation. She may have underlying COPD with smoking hx so will need PFT as outpatient. Bedside US did show bilateral B lines with no effusions and some LE edema So she may benefit from some diuresis but this may also be atelectasis.    Recommendations  - Follow up outpatient for sleep study  - Hypoventilation causing baseline oxygen readings no indication for home oxygen, high probability of OHS given bicarb and BMI  - Possible underlying COPD with smoking hx will need follow up with PFT as outpatient 61 year old female presenting with edema, SOB, orthopnea. Found to have abnormal coronary CT and taken for stent placement with near complete resolution of SOB. Pulmonary team called due to hx of SHANKAR and possible asthma. Patient on room air with hypoventilation at time of examination.     SHANKAR  Asthma  Former smoker    Patient had saturation of about 86-88 at time of examination and able to be increased to 93-94 with hyperventilation so likely obesity hypoventilation. She also meets criteria with BMI over 35 and bicarb of 35 on admission indicating chronic hypercapnia. Patient also had documented SHANKAR with sleep study 20 years ago but unable to tolerate CPAP. Given this would advise that she follow up outpatient with home sleep study and evaluation of severity and discuss treatment options. She has no sleep symptoms other then some hypersomnolence. No hx of asthma present as she does not wheeze, no airway hyperactivity, and no steroid usage indicating exacerbation. She may have underlying COPD with smoking hx so will need PFT as outpatient. Bedside US did show bilateral B lines with no effusions and some LE edema So she may benefit from some diuresis but this may also be atelectasis.    Recommendations  - Follow up outpatient for sleep study  - Hypoventilation causing baseline oxygen readings no indication for home oxygen, OHS can be considered, but other causes of hypoventilation/co2 retention such as copd should be entertained first.  - Possible underlying COPD with smoking hx will need follow up with PFT as outpatient

## 2022-02-10 NOTE — CONSULT NOTE ADULT - SUBJECTIVE AND OBJECTIVE BOX
PULMONARY SERVICE INITIAL CONSULT NOTE    HPI:  This is a 60y/o obese female, recent smoker (quit 2 weeks ago), FHx of CD (father  of MI at age 55), PMHx asthma -no home oxygen, HTN who presented to the ER with 1 month of progressive SOB on minimal exertion, 2 pillow orthopnea, MELQUIADES edema, palpitations and chest pain. She describes pain as burning sensation, band-like under both breasts, that has been constant but worsened with exertion rated 7/10. She saw a cardiologist on  who discontinued her Lotrel and started her Valsartan/HCTz in the morning and Toprol at night. She was scheduled for outpt stress test, echocardiogram and carotid US at the end of the week but symptoms have been worsened so she presented for management. Patient was given IV diuresis on initial admission but was still having some SOB with abnormal CCTA. Because of possible asthma hx vs SHANKAR hx pulmonary team was consulted for management. Patient at time of exam is resting comfortably in bed and is status post stent placement with drastic improvement in SOB. She states that she was never diagnosed with asthma has never had exacerbation of any kind and does have albuterol inhaler at home. She was using it during this with minimal improvement. Does state that she saw a pulmonologist in november with PFT done that showed no COPD but was told by her primary care that she had it. Does have smoking hx with 1 pack per day of 30 years.     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 NO pets at home and no hx of blood clots or pulmonary disease that she is aware of. Did have sleep study about 20 years ago was told she had SHANKAR and needed a sleep study. However, she was unable to tolerate CPAP.      REVIEW OF SYSTEMS:  All additional ROS negative.    PAST MEDICAL & SURGICAL HISTORY:  HTN (hypertension)    Chronic obstructive pulmonary disease (COPD)    S/P cholecystectomy    H/O tear of meniscus of knee joint        FAMILY HISTORY:  FH: myocardial infarction (Father)    Family history of valvular heart disease (Mother)        SOCIAL HISTORY:  Smoking Status: [ ] Current, [ ] Former, [ ] Never  Pack Years:    MEDICATIONS:  Pulmonary:  albuterol/ipratropium for Nebulization 3 milliLiter(s) Nebulizer every 6 hours PRN    Antimicrobials:    Anticoagulants:  aspirin enteric coated 81 milliGRAM(s) Oral daily  clopidogrel Tablet 75 milliGRAM(s) Oral daily    Onc:    GI/:  pantoprazole    Tablet 40 milliGRAM(s) Oral before breakfast    Endocrine:  atorvastatin 40 milliGRAM(s) Oral at bedtime    Cardiac:    Other Medications:  acetaminophen     Tablet .. 650 milliGRAM(s) Oral every 6 hours PRN  influenza   Vaccine 0.5 milliLiter(s) IntraMuscular once  potassium chloride    Tablet ER 40 milliEquivalent(s) Oral once      Allergies    No Known Allergies    Intolerances        Vital Signs Last 24 Hrs  T(C): 36.1 (10 Feb 2022 09:38), Max: 37 (10 Feb 2022 06:28)  T(F): 97 (10 Feb 2022 09:38), Max: 98.6 (10 Feb 2022 06:28)  HR: 68 (10 Feb 2022 08:49) (52 - 87)  BP: 126/60 (10 Feb 2022 08:49) (84/46 - 135/69)  BP(mean): 87 (10 Feb 2022 08:49) (62 - 98)  RR: 20 (10 Feb 2022 08:49) (18 - 20)  SpO2: 95% (10 Feb 2022 08:49) (92% - 98%)     @ 07:  -  02-10 @ 07:00  --------------------------------------------------------  IN: 120 mL / OUT: 500 mL / NET: -380 mL    02-10 @ 07:01  -  10 @ 10:17  --------------------------------------------------------  IN: 180 mL / OUT: 400 mL / NET: -220 mL          PHYSICAL EXAM:  Constitutional: WDWN  Head: NC/AT  EENT: PERRL, anicteric sclera; oropharynx clear, MMM  Neck: supple, no appreciable JVD  Respiratory: CTA B/L; no W/R/R  Cardiovascular: +S1/S2, RRR  Gastrointestinal: soft, NT/ND  Extremities: WWP; no edema, clubbing or cyanosis  Vascular: 2+ radial pulses B/L  Neurological: awake and alert; SHARMA    LABS:      CBC Full  -  ( 10 Feb 2022 06:17 )  WBC Count : 9.92 K/uL  RBC Count : 5.12 M/uL  Hemoglobin : 13.4 g/dL  Hematocrit : 46.1 %  Platelet Count - Automated : 284 K/uL  Mean Cell Volume : 90.0 fl  Mean Cell Hemoglobin : 26.2 pg  Mean Cell Hemoglobin Concentration : 29.1 gm/dL  Auto Neutrophil # : 6.98 K/uL  Auto Lymphocyte # : 1.59 K/uL  Auto Monocyte # : 1.14 K/uL  Auto Eosinophil # : 0.12 K/uL  Auto Basophil # : 0.04 K/uL  Auto Neutrophil % : 70.4 %  Auto Lymphocyte % : 16.0 %  Auto Monocyte % : 11.5 %  Auto Eosinophil % : 1.2 %  Auto Basophil % : 0.4 %    0210    138  |  97  |  22  ----------------------------<  107<H>  3.5   |  35<H>  |  0.80    Ca    8.8      10 Feb 2022 06:17  Mg     2.4     02-10    TPro  5.8<L>  /  Alb  3.0<L>  /  TBili  0.5  /  DBili  x   /  AST  17  /  ALT  10  /  AlkPhos  76  02-10                      RADIOLOGY & ADDITIONAL STUDIES:    < from: CT Angio Chest PE Protocol w/ IV Cont (22 @ 23:26) >  ACC: [id number] EXAM:  CT ANGIO CHEST PULM [facility]                          PROCEDURE DATE:  2022          INTERPRETATION:  CTA (CT angiography) of the CHEST    INDICATION: Shortness of breath. Assess for pulmonary embolism.    TECHNIQUE: CT angiography of the chest was performed during bolus   injection of 100 mL intravenous contrast -[id number]-370. Post-processing   including the production of axial, coronal and sagittal multiplanar   reformatted images and axial and coronal maximum intensity projections   (MIPs) was performed.    PRIOR STUDY: None.    FINDINGS:    Pulmonary arteries: No pulmonary embolism is seen. The main pulmonary   trunk is not dilated.    Lungs and large airways: No focal consolidation. Patent central airways.   Bilateral linear scarring versus subsegmental atelectasis. Bilateral   mosaic lung attenuation. Study performed in inspiration..    Pleura:  No pleural effusion or pneumothorax.    Lymph nodes: No thoracic lymphadenopathy.    Cardiovascular:  Cardiomegaly. Left ventricular apical thinning. No   thoracic aortic aneurysm.    Pericardial effusion: Trace pericardial fluid..    Chest wall and lower neck:  Within normal limits.    Upper abdomen: Status post cholecystectomy. Enlarged gastrohepatic   ligament nodes up to 1.5 cm diameter 1.6 cm right adrenal nodule. 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 Replaced right hepatic artery of the SMA. Mild   focal plaque involving the proximal SMA.    Bones: Moderate degenerative changes of the spine. Hypodensities within   the T7 and T11 vertebral bodies are consistent with hemangiomas.      IMPRESSION:  No pulmonary emboli visualized.    --- End of Report ---    < end of copied text >

## 2022-02-10 NOTE — DISCHARGE NOTE NURSING/CASE MANAGEMENT/SOCIAL WORK - NSDCPEFALRISK_GEN_ALL_CORE
For information on Fall & Injury Prevention, visit: [email or web address] OR  [email or web address] OR  https://www.cdc.gov/steadi/patient.html

## 2022-02-10 NOTE — DISCHARGE NOTE PROVIDER - PROVIDER TOKENS
PROVIDER:[TOKEN:[[id number]],FOLLOWUP:[1 week]] PROVIDER:[TOKEN:[[id number]],FOLLOWUP:[1 week]] PROVIDER:[TOKEN:[[id number]],FOLLOWUP:[1 week]],PROVIDER:[TOKEN:[[id number]],FOLLOWUP:[1 month]] PROVIDER:[TOKEN:[[id number]],SCHEDULEDAPPT:[02/28/2022],SCHEDULEDAPPTTIME:[10:00 AM]],FREE:[LAST:[Amando],FIRST:[Philip],PHONE:[phone]],FAX:[(   )    -],ADDRESS:[[address] [place] [address]],SCHEDULEDAPPT:[03/03/2022],SCHEDULEDAPPTTIME:[03:30 PM]]

## 2022-02-10 NOTE — DISCHARGE NOTE PROVIDER - NSDCFUSCHEDAPPT_GEN_ALL_CORE_FT
HARIS TO ; 03/14/2022 ; [facility] [id number] [facility] ; 02/28/2022 ; [facility] [address] [place] [address] ; 03/14/2022 ; [facility] [id number] [facility]

## 2022-02-10 NOTE — DISCHARGE NOTE PROVIDER - NSDCCPCAREPLAN_GEN_ALL_CORE_FT
PRINCIPAL DISCHARGE DIAGNOSIS  Diagnosis: Chest pain  Assessment and Plan of Treatment: You came to the hospital to evaluation of your chest pain. You underwent a CT of your heart and you were found to have blockages in the arteries of your heart, also known as Coronary Artery Disease. You underwent a cardiac catheterization on 2/9/2022 and received two stents to the left anterior artery. You still have residual blockage in the right coronary artery which you must return in 4-5 weeks for a stent.  PLEASE CONTINUE ASPIRIN 81MG DAILY AND PLAVIX 75MG DAILY. DO NOT STOP THESE MEDICATIONS FOR ANY REASON AS THEY ARE KEEPING YOUR STENT OPEN AND PREVENTING A HEART ATTACK.   Avoid strenuous activity or heavy lifting anything more than 5lbs for the next five days. Do not take a bath or swim for the next five days; you may shower. For any bleeding or hematoma formation (hardened blood collection under the skin) at the access site of your right wrist and right groin please hold pressure and go to the emergency room. Please follow up with Dr. Cm in 1-2 weeks and return for a stent in 4-5 weeks. For recurrent chest pain, please call your doctor or go to the emergency room.      SECONDARY DISCHARGE DIAGNOSES  Diagnosis: HTN (hypertension)  Assessment and Plan of Treatment: Please continue ____ as listed to keep your blood pressure controlled. For blood pressure that is too high or too low please see your doctor or go to the emergency room as necessary.    Diagnosis: HLD (hyperlipidemia)  Assessment and Plan of Treatment: Please START Atorvastatin 40mg at bedtime to keep your cholesterol low. High cholesterol contributes to heart disease.     PRINCIPAL DISCHARGE DIAGNOSIS  Diagnosis: Chest pain  Assessment and Plan of Treatment: You came to the hospital to evaluation of your chest pain. You underwent a CT of your heart and you were found to have blockages in the arteries of your heart, also known as Coronary Artery Disease. You underwent a cardiac catheterization on 2/9/2022 and received two stents to the left anterior artery. You still have residual blockage in the right coronary artery which you must return in 4-5 weeks for a stent.  PLEASE CONTINUE ASPIRIN 81MG DAILY AND PLAVIX 75MG DAILY. DO NOT STOP THESE MEDICATIONS FOR ANY REASON AS THEY ARE KEEPING YOUR STENT OPEN AND PREVENTING A HEART ATTACK.   Avoid strenuous activity or heavy lifting anything more than 5lbs for the next five days. Do not take a bath or swim for the next five days; you may shower. For any bleeding or hematoma formation (hardened blood collection under the skin) at the access site of your right wrist and right groin please hold pressure and go to the emergency room. Please follow up with Dr. Goel in 1-2 weeks and return for a stent in 4-5 weeks. For recurrent chest pain, please call your doctor or go to the emergency room.      SECONDARY DISCHARGE DIAGNOSES  Diagnosis: HTN (hypertension)  Assessment and Plan of Treatment: Please continue ____ as listed to keep your blood pressure controlled. For blood pressure that is too high or too low please see your doctor or go to the emergency room as necessary.    Diagnosis: HLD (hyperlipidemia)  Assessment and Plan of Treatment: Please START Atorvastatin 40mg at bedtime to keep your cholesterol low. High cholesterol contributes to heart disease.     PRINCIPAL DISCHARGE DIAGNOSIS  Diagnosis: Chest pain  Assessment and Plan of Treatment: You came to the hospital to evaluation of your chest pain. You underwent a CT of your heart and you were found to have blockages in the arteries of your heart, also known as Coronary Artery Disease. You underwent a cardiac catheterization on 2/9/2022 and received two stents to the left anterior artery. You still have residual blockage in the right coronary artery which you must return in 4-5 weeks for a stent.  PLEASE CONTINUE ASPIRIN 81MG DAILY AND PLAVIX 75MG DAILY. DO NOT STOP THESE MEDICATIONS FOR ANY REASON AS THEY ARE KEEPING YOUR STENTS OPEN AND PREVENTING A HEART ATTACK.   Avoid strenuous activity or heavy lifting anything more than 5lbs for the next five days. Do not take a bath or swim for the next five days; you may shower. For any bleeding or hematoma formation (hardened blood collection under the skin) at the access site of your right wrist and right groin please hold pressure and go to the emergency room. Please follow up with Dr. Goel in 1-2 weeks. For recurrent chest pain, please call your doctor or go to the emergency room.      SECONDARY DISCHARGE DIAGNOSES  Diagnosis: HTN (hypertension)  Assessment and Plan of Treatment: Please STOP Valsartan/HCTZ and DECREASE METOPROLOL to 12.5mg (half a tablet) once a day as listed to keep your blood pressure controlled. For blood pressure that is too high or too low please see your doctor or go to the emergency room as necessary.    Diagnosis: HLD (hyperlipidemia)  Assessment and Plan of Treatment: Please START Atorvastatin 40mg at bedtime to keep your cholesterol low. High cholesterol contributes to heart disease.     PRINCIPAL DISCHARGE DIAGNOSIS  Diagnosis: Chest pain  Assessment and Plan of Treatment: You came to the hospital to evaluation of your chest pain. You underwent a CT of your heart and you were found to have blockages in the arteries of your heart, also known as Coronary Artery Disease. You underwent a cardiac catheterization on 2/9/2022 and received two stents to the left anterior artery. You still have residual blockage in the right coronary artery which you must return in 4-5 weeks for a stent.  PLEASE CONTINUE ASPIRIN 81MG DAILY AND PLAVIX 75MG DAILY. DO NOT STOP THESE MEDICATIONS FOR ANY REASON AS THEY ARE KEEPING YOUR STENTS OPEN AND PREVENTING A HEART ATTACK.   Avoid strenuous activity or heavy lifting anything more than 5lbs for the next five days. Do not take a bath or swim for the next five days; you may shower. For any bleeding or hematoma formation (hardened blood collection under the skin) at the access site of your right wrist and right groin please hold pressure and go to the emergency room. Please follow up with Dr. Goel in 1-2 weeks. For recurrent chest pain, please call your doctor or go to the emergency room.      SECONDARY DISCHARGE DIAGNOSES  Diagnosis: HTN (hypertension)  Assessment and Plan of Treatment: Please STOP Valsartan/HCTZ and DECREASE METOPROLOL to 12.5mg (half a tablet) once a day as listed to keep your blood pressure controlled. For blood pressure that is too high or too low please see your doctor or go to the emergency room as necessary.    Diagnosis: HLD (hyperlipidemia)  Assessment and Plan of Treatment: Please START Atorvastatin 40mg at bedtime to keep your cholesterol low. High cholesterol contributes to heart disease.    Diagnosis: SHANKAR (obstructive sleep apnea)  Assessment and Plan of Treatment: Please follow up with Dr. Sanches in 1 month for a sleep study.     PRINCIPAL DISCHARGE DIAGNOSIS  Diagnosis: Chest pain  Assessment and Plan of Treatment: You came to the hospital to evaluation of your chest pain. You underwent a CT of your heart and you were found to have blockages in the arteries of your heart, also known as Coronary Artery Disease. You underwent a cardiac catheterization on 2/9/2022 and received two stents to the left anterior artery. You still have residual blockage in the right coronary artery which you must return in 4-5 weeks for a stent.  PLEASE CONTINUE ASPIRIN 81MG DAILY AND PLAVIX 75MG DAILY. DO NOT STOP THESE MEDICATIONS FOR ANY REASON AS THEY ARE KEEPING YOUR STENTS OPEN AND PREVENTING A HEART ATTACK.   Avoid strenuous activity or heavy lifting anything more than 5lbs for the next five days. Do not take a bath or swim for the next five days; you may shower. For any bleeding or hematoma formation (hardened blood collection under the skin) at the access site of your right wrist and right groin please hold pressure and go to the emergency room. Please follow up with Dr. Goel on 3/3/22 at 3:30pm. For recurrent chest pain, please call your doctor or go to the emergency room.      SECONDARY DISCHARGE DIAGNOSES  Diagnosis: HTN (hypertension)  Assessment and Plan of Treatment: Please STOP Valsartan/HCTZ and DECREASE METOPROLOL to 12.5mg (half a tablet) once a day as listed to keep your blood pressure controlled. For blood pressure that is too high or too low please see your doctor or go to the emergency room as necessary.    Diagnosis: HLD (hyperlipidemia)  Assessment and Plan of Treatment: Please START Atorvastatin 40mg at bedtime to keep your cholesterol low. High cholesterol contributes to heart disease.    Diagnosis: SHANKAR (obstructive sleep apnea)  Assessment and Plan of Treatment: Please follow up with Dr. Sanches in 1 month for a sleep study.

## 2022-02-10 NOTE — DISCHARGE NOTE PROVIDER - NSDCMRMEDTOKEN_GEN_ALL_CORE_FT
metoprolol succinate 25 mg oral capsule, extended release: 1 cap(s) orally once a day (at bedtime)  valsartan-hydrochlorothiazide 160 mg-25 mg oral tablet: 1 tab(s) orally once a day   aspirin 81 mg oral delayed release tablet: 1 tab(s) orally once a day  atorvastatin 40 mg oral tablet: 1 tab(s) orally once a day (at bedtime)  Cardiac Rehab: 2-3x per week for 12 weeks  Dx- CAD s/p PCI  clopidogrel 75 mg oral tablet: 1 tab(s) orally once a day  pantoprazole 40 mg oral delayed release tablet: 1 tab(s) orally once a day (before a meal)  Toprol-XL 25 mg oral tablet, extended release: 0.5 tab(s) orally once a day

## 2022-02-17 ENCOUNTER — TRANSCRIPTION ENCOUNTER (OUTPATIENT)
Age: 62
End: 2022-02-17

## 2022-02-28 ENCOUNTER — APPOINTMENT (OUTPATIENT)
Dept: PULMONOLOGY | Facility: CLINIC | Age: 62
End: 2022-02-28

## 2022-03-01 ENCOUNTER — APPOINTMENT (OUTPATIENT)
Dept: SURGERY | Facility: CLINIC | Age: 62
End: 2022-03-01
Payer: COMMERCIAL

## 2022-03-01 VITALS
HEIGHT: 62 IN | SYSTOLIC BLOOD PRESSURE: 130 MMHG | OXYGEN SATURATION: 92 % | DIASTOLIC BLOOD PRESSURE: 79 MMHG | WEIGHT: 240 LBS | HEART RATE: 62 BPM | BODY MASS INDEX: 44.16 KG/M2

## 2022-03-01 PROCEDURE — 99242 OFF/OP CONSLTJ NEW/EST SF 20: CPT

## 2022-03-01 RX ORDER — ALBUTEROL SULFATE 90 UG/1
108 (90 BASE) INHALANT RESPIRATORY (INHALATION)
Qty: 8 | Refills: 0 | Status: ACTIVE | COMMUNITY
Start: 2021-07-13

## 2022-03-01 RX ORDER — PANTOPRAZOLE 40 MG/1
40 TABLET, DELAYED RELEASE ORAL
Qty: 30 | Refills: 0 | Status: ACTIVE | COMMUNITY
Start: 2022-02-10

## 2022-03-01 NOTE — HISTORY OF PRESENT ILLNESS
[de-identified] : Patient presents to the office with a history of cyst of the kidney and also gives a history of the cyst of the ovary as well as a lump in her left elbow region.

## 2022-03-01 NOTE — CONSULT LETTER
[Dear  ___] : Dear  [unfilled], [Consult Letter:] : I had the pleasure of evaluating your patient, [unfilled]. [Please see my note below.] : Please see my note below. [Consult Closing:] : Thank you very much for allowing me to participate in the care of this patient.  If you have any questions, please do not hesitate to contact me. [Sincerely,] : Sincerely, [FreeTextEntry3] : Benito Lucero MD, FACS\par [facility]\[place] [address]\par

## 2022-03-01 NOTE — PHYSICAL EXAM
[Alert] : alert [Oriented to Person] : oriented to person [Oriented to Place] : oriented to place [Oriented to Time] : oriented to time [Calm] : calm [de-identified] : Patient is in no acute distress. [de-identified] : Left posterior elbow region mass about the olecranon process.  The mass is approximately 3 cm in size and it is movable separate from the joint region.  It is firm on palpation.  There is no drainage. [de-identified] : No skin changes overlying the mass.

## 2022-03-01 NOTE — ASSESSMENT
[FreeTextEntry1] : After examination patient was explained about seeing a urologist for her renal cyst.  For her ovary cyst she is having a follow-up with her gynecologist.  Patient was explained about the elbow region mass for which a sonogram is ordered and depending upon the results further treatment will be rendered.  Follow-up in the office after the sonogram explained to the patient.

## 2022-03-10 ENCOUNTER — NON-APPOINTMENT (OUTPATIENT)
Age: 62
End: 2022-03-10

## 2022-03-10 ENCOUNTER — APPOINTMENT (OUTPATIENT)
Dept: HEART AND VASCULAR | Facility: CLINIC | Age: 62
End: 2022-03-10
Payer: COMMERCIAL

## 2022-03-10 ENCOUNTER — APPOINTMENT (OUTPATIENT)
Dept: PULMONOLOGY | Facility: CLINIC | Age: 62
End: 2022-03-10

## 2022-03-10 VITALS
BODY MASS INDEX: 44.16 KG/M2 | WEIGHT: 240 LBS | SYSTOLIC BLOOD PRESSURE: 137 MMHG | OXYGEN SATURATION: 84 % | HEIGHT: 62 IN | HEART RATE: 75 BPM | DIASTOLIC BLOOD PRESSURE: 64 MMHG

## 2022-03-10 PROCEDURE — 99214 OFFICE O/P EST MOD 30 MIN: CPT

## 2022-03-10 NOTE — PHYSICAL EXAM
[Well Developed] : well developed [Well Nourished] : well nourished [Normal Conjunctiva] : normal conjunctiva [No Acute Distress] : no acute distress [Normal Venous Pressure] : normal venous pressure [No Carotid Bruit] : no carotid bruit [Normal S1, S2] : normal S1, S2 [No Murmur] : no murmur [No Rub] : no rub [No Gallop] : no gallop [Clear Lung Fields] : clear lung fields [Good Air Entry] : good air entry [No Respiratory Distress] : no respiratory distress  [Soft] : abdomen soft [No Masses/organomegaly] : no masses/organomegaly [Non Tender] : non-tender [Normal Bowel Sounds] : normal bowel sounds [Normal Gait] : normal gait [No Edema] : no edema [No Cyanosis] : no cyanosis [No Clubbing] : no clubbing [No Varicosities] : no varicosities [No Rash] : no rash [No Skin Lesions] : no skin lesions [Moves all extremities] : moves all extremities [No Focal Deficits] : no focal deficits [Normal Speech] : normal speech [Alert and Oriented] : alert and oriented [Normal memory] : normal memory

## 2022-03-10 NOTE — REASON FOR VISIT
[FreeTextEntry1] : 61-year-old female recent smoker with past medical history of CAD status post PCI on 2/9/2022 here for first evaluation after PCI.\par The patient reports a significant improvement of her symptoms with some residual dyspnea on exertion.\par The patient has residual RCA disease and is scheduled for staged PCI\par She reports compliance with her medications\par \par \par Discharge medications: ASA 81mg QD, Plavix 75mg QD, Lipitor 40mg HS, and Toprol \par 12.5mg QD. \par \par Echocardiogram 3/10/2022\par EF 55%\par Basal to mid inferolateral wall hypokinesis\par \par Cardiac catheterization 2/9/2022\par Impella supported PCI of 99% proximal LAD with a two 5 x 38 and 3 oh by 23 Xience postdilated to 3.5\par Left main: Normal\par LAD proximal 99% status post PCI\par L circumflex 40%\par RCA: 99%\par \par Labs 2/7/2022\par Creatinine 1.0\par \par EKG 2/7/2022\par Sinus bradycardia, Right bundle branch block

## 2022-03-11 RX ORDER — METOPROLOL SUCCINATE 25 MG/1
25 TABLET, EXTENDED RELEASE ORAL
Qty: 90 | Refills: 0 | Status: DISCONTINUED | COMMUNITY
Start: 2022-01-31 | End: 2022-03-11

## 2022-03-11 RX ORDER — AMLODIPINE BESYLATE AND BENAZEPRIL HYDROCHLORIDE 5; 20 MG/1; MG/1
5-20 CAPSULE ORAL
Qty: 90 | Refills: 0 | Status: DISCONTINUED | COMMUNITY
Start: 2022-01-31 | End: 2022-03-11

## 2022-03-14 ENCOUNTER — APPOINTMENT (OUTPATIENT)
Dept: GASTROENTEROLOGY | Facility: CLINIC | Age: 62
End: 2022-03-14
Payer: COMMERCIAL

## 2022-03-14 DIAGNOSIS — Z78.9 OTHER SPECIFIED HEALTH STATUS: ICD-10-CM

## 2022-03-14 DIAGNOSIS — F17.210 NICOTINE DEPENDENCE, CIGARETTES, UNCOMPLICATED: ICD-10-CM

## 2022-03-14 DIAGNOSIS — R10.9 UNSPECIFIED ABDOMINAL PAIN: ICD-10-CM

## 2022-03-14 PROCEDURE — 99443: CPT

## 2022-03-14 NOTE — HISTORY OF PRESENT ILLNESS
[Home] : at home, [unfilled] , at the time of the visit. [Medical Office: ([facility])___] : at the medical office located in  [Verbal consent obtained from patient] : the patient, [unfilled] [FreeTextEntry4] : Vasiliy [de-identified] : Last colonoscopy done 3 years ago with Dr. Darden

## 2022-03-14 NOTE — REASON FOR VISIT
[Consultation] : a consultation visit [FreeTextEntry1] : NP - Ref by Dr. Lucero for Stomach discomfort

## 2022-03-14 NOTE — REVIEW OF SYSTEMS
[Abdominal Pain] : abdominal pain [Negative] : Respiratory [Nausea] : no nausea [Constipation] : no constipation [Vomiting] : no vomiting [FreeTextEntry5] : as in HPI

## 2022-03-17 VITALS
OXYGEN SATURATION: 98 % | SYSTOLIC BLOOD PRESSURE: 129 MMHG | DIASTOLIC BLOOD PRESSURE: 59 MMHG | HEART RATE: 58 BPM | HEIGHT: 62 IN | RESPIRATION RATE: 20 BRPM | TEMPERATURE: 98 F | WEIGHT: 240.08 LBS

## 2022-03-17 RX ORDER — CHLORHEXIDINE GLUCONATE 213 G/1000ML
1 SOLUTION TOPICAL ONCE
Refills: 0 | Status: DISCONTINUED | OUTPATIENT
Start: 2022-03-24 | End: 2022-03-25

## 2022-03-17 NOTE — H&P ADULT - ASSESSMENT
62 y/o female, recent former smoker (quit approximately 2-3 months ago), w/ FHx early MI and PMHx HTN, HLD, Asthma, possible COPD and sleep apnea (planned for further outpatient workup), and CAD (s/p recent PCI, see below) who initially presented to [facility] ED endorsing MAIER w/ minimal exertion, and was subsequently admitted to cardiology/telemetry and underwent cardiac catheterization w/ Impella assisted NATHANAEL x 2 proximal LAD and residual findings of mid RCA 99% stenosis. Since prior procedure, patient reports that she feels better than she previously did, but does continue to endorse some dyspnea w/ exertion that is not as severe as it was before. Patient also endorses compliance w/ DAPT. Patient denies any dizziness, syncope, palpitations, chest pain, abdominal pain, nausea/vomiting, melena, LE edema, fever, chills, cough. Echocardiogram (02/08/2022) revealed aortic sclerosis w/o significant stenosis, no significant valvular disease, EF 55%, poor endocardial delineation precluding assessment of regional wall motion abnormalities, probably apical septal akinesis, basal to mid inferolateral hypokinesis, and trivial pericardial effusion.  In light of patient's risk factors, ongoing anginal equivalent symptoms, and known residual CAD, patient now presents for staged PCI.   EKG:					  ASA _____				Mallampati class: _________	            Anginal Class: _________    -Allergy Status:   -H/H = *****Pt denies BRBPR, hematuria, hematochezia, melena. Pt given ASA:  and Plavix:     -BUN/Cr = **. EF****. Euvolemic on exam. IV NS @ *****started pre procedure    Sedation Plan:   ? None   ? Moderate    ?  Deep    ?  General Anesthesia   Patient Is Suitable Candidate For Sedation?     ? Yes   ? No   ? Not Applicable     Risks & benefits of procedure and sedation and risks and benefits for the alternative therapy have been explained to the patient in layman’s terms including but not limited to: allergic reaction, bleeding, infection, arrhythmia, respiratory compromise, renal and vascular compromise, limb damage, MI, CVA, emergent CABG/Vascular Surgery and death. Informed consent obtained and in chart.   62 y/o female, recent former smoker (quit approximately 2-3 months ago), w/ FHx early MI and PMHx HTN, HLD, Asthma, possible COPD and sleep apnea (planned for further outpatient workup), and CAD (s/p recent PCI, see below) who initially presented to [facility] ED endorsing MAIER w/ minimal exertion, and was subsequently admitted to cardiology/telemetry and underwent cardiac catheterization w/ Impella assisted NATHANAEL x 2 proximal LAD and residual findings of mid RCA 99% stenosis now presents for staged PCI RCA with Dr. Goel.      EKG:  SB @ 58bpm, RBBB, q waves in infero, anteroseptal leads, TWI in lateral leads  				  ASA: III				Mallampati class: II            Anginal Class: 3    -Allergy Status:   -H/H =  14.4/47.7.  Pt denies BRBPR, hematuria, hematochezia, melena. Pt given ASA 81mg daily, took her Plavix 75mg today at home     -BUN/Cr:  24/0.58.  EF 55%.   Euvolemic on exam; however, with c/o fatigue on O2 supplementation. , IV NS @ 75cc x 4 hours started pre procedure.  (differed from hydration protocol 2/2 to fatigue)    Sedation Plan:  Moderate   Patient Is Suitable Candidate For Sedation  Yes       Risks & benefits of procedure and sedation and risks and benefits for the alternative therapy have been explained to the patient in layman’s terms including but not limited to: allergic reaction, bleeding, infection, arrhythmia, respiratory compromise, renal and vascular compromise, limb damage, MI, CVA, emergent CABG/Vascular Surgery and death. Informed consent obtained and in chart.

## 2022-03-17 NOTE — H&P ADULT - PATIENT'S GENDER IDENTITY
Okay to write a letter stating that patient has an underlying chronic inflammatory condition that is recently flared and I would not be and her health interest to travel outside of the country at the present   Female

## 2022-03-17 NOTE — H&P ADULT - HISTORY OF PRESENT ILLNESS
SKELETON !!   Cardiologist: Dr. Goel  Pharmacy:  COVID:  Escort:    61Y F, former smoker (quit 2 months ago), w/ FHx early MI and PMHx HTN, HLD, CAD s/p cardiac cath @ [facility] 2/9/22: impella assisted: NATHANAEL x 2 pLAD (99%) w/ residual mRCA 99%, dLCx 40%, EDP 28, EF 45%, Asthma who now presents for staged PCI of known residual RCA disease.   Since his recent procedure, pt. reports feeling much better but still has MAIER on …   Compliant with DAPT.   Pt. denies any …   In light of patients risk factors, CCS class __ sx and known residual RCA disease, pt. now presents for staged PCI of residual disease.     Echo 2/8/22: LVEF 55% w/ probable apical septal akinesis, there is basal to mid inferolateral hypokinesis.   CCTA 02/2022: Ca 713, severe mLAD and probable severe mRCA stenosis, mod pLCx stenosis, calcific plaque obscures lumen of small OM, remaining non obstructive.    COVID: Monday (03/21/2022) at [facility] [address]   Cardiologist: Dr. Goel   Pharmacy: [facility] (in [place])  Escort: N/A, Staged Procedure   60 y/o female, recent former smoker (quit approximately 2-3 months ago), w/ FHx early MI and PMHx HTN, HLD, Asthma, possible COPD and sleep apnea (planned for further outpatient workup), and CAD (s/p recent PCI, see below) who initially presented to [facility] ED endorsing MAIER w/ minimal exertion, and was subsequently admitted to cardiology/telemetry and underwent cardiac catheterization w/ Impella assisted NATHANAEL x 2 proximal LAD and residual findings of mid RCA 99% stenosis. Since prior procedure, patient reports that she feels better than she previously did, but does continue to endorse some dyspnea w/ exertion that is not as severe as it was before. Patient also endorses compliance w/ DAPT. Patient denies any dizziness, syncope, palpitations, chest pain, abdominal pain, nausea/vomiting, melena, LE edema, fever, chills, cough. Echocardiogram (02/08/2022) revealed aortic sclerosis w/o significant stenosis, no significant valvular disease, EF 55%, poor endocardial delineation precluding assessment of regional wall motion abnormalities, probably apical septal akinesis, basal to mid inferolateral hypokinesis, and trivial pericardial effusion.  In light of patient's risk factors, ongoing anginal equivalent symptoms, and known residual CAD, patient now presents for staged PCI.

## 2022-03-17 NOTE — H&P ADULT - NSHPSOCIALHISTORY_GEN_ALL_CORE
Patient reports recently quitting tobacco use, about 2-3 months ago. Patient reports occasional wine consumption. Patient denies illicit drug use.

## 2022-03-17 NOTE — H&P ADULT - NSICDXPASTMEDICALHX_GEN_ALL_CORE_FT
PAST MEDICAL HISTORY:  Chronic obstructive pulmonary disease (COPD)     HTN (hypertension)      PAST MEDICAL HISTORY:  CAD (coronary artery disease)     Chronic obstructive pulmonary disease (COPD)     HTN (hypertension)     Hyperlipidemia

## 2022-03-24 ENCOUNTER — INPATIENT (INPATIENT)
Facility: HOSPITAL | Age: 62
LOS: 0 days | Discharge: HOME | DRG: 247 | End: 2022-03-25
Attending: INTERNAL MEDICINE | Admitting: INTERNAL MEDICINE
Payer: COMMERCIAL

## 2022-03-24 ENCOUNTER — TRANSCRIPTION ENCOUNTER (OUTPATIENT)
Age: 62
End: 2022-03-24

## 2022-03-24 DIAGNOSIS — Z90.49 ACQUIRED ABSENCE OF OTHER SPECIFIED PARTS OF DIGESTIVE TRACT: Chronic | ICD-10-CM

## 2022-03-24 DIAGNOSIS — Z87.828 PERSONAL HISTORY OF OTHER (HEALED) PHYSICAL INJURY AND TRAUMA: Chronic | ICD-10-CM

## 2022-03-24 LAB
A1C WITH ESTIMATED AVERAGE GLUCOSE RESULT: 6 % — HIGH (ref 4–5.6)
ALBUMIN SERPL ELPH-MCNC: 3.8 G/DL — SIGNIFICANT CHANGE UP (ref 3.3–5)
ALP SERPL-CCNC: 92 U/L — SIGNIFICANT CHANGE UP (ref 40–120)
ALT FLD-CCNC: 16 U/L — SIGNIFICANT CHANGE UP (ref 10–45)
ANION GAP SERPL CALC-SCNC: 7 MMOL/L — SIGNIFICANT CHANGE UP (ref 5–17)
APTT BLD: 30.5 SEC — SIGNIFICANT CHANGE UP (ref 27.5–35.5)
AST SERPL-CCNC: 26 U/L — SIGNIFICANT CHANGE UP (ref 10–40)
BASOPHILS # BLD AUTO: 0.03 K/UL — SIGNIFICANT CHANGE UP (ref 0–0.2)
BASOPHILS NFR BLD AUTO: 0.4 % — SIGNIFICANT CHANGE UP (ref 0–2)
BILIRUB DIRECT SERPL-MCNC: <0.2 MG/DL — SIGNIFICANT CHANGE UP (ref 0–0.3)
BILIRUB INDIRECT FLD-MCNC: SIGNIFICANT CHANGE UP MG/DL (ref 0.2–1)
BILIRUB SERPL-MCNC: 0.3 MG/DL — SIGNIFICANT CHANGE UP (ref 0.2–1.2)
BUN SERPL-MCNC: 24 MG/DL — HIGH (ref 7–23)
CALCIUM SERPL-MCNC: 9 MG/DL — SIGNIFICANT CHANGE UP (ref 8.4–10.5)
CHLORIDE SERPL-SCNC: 102 MMOL/L — SIGNIFICANT CHANGE UP (ref 96–108)
CHOLEST SERPL-MCNC: 138 MG/DL — SIGNIFICANT CHANGE UP
CO2 SERPL-SCNC: 30 MMOL/L — SIGNIFICANT CHANGE UP (ref 22–31)
CREAT SERPL-MCNC: 0.58 MG/DL — SIGNIFICANT CHANGE UP (ref 0.5–1.3)
EGFR: 102 ML/MIN/1.73M2 — SIGNIFICANT CHANGE UP
EOSINOPHIL # BLD AUTO: 0.1 K/UL — SIGNIFICANT CHANGE UP (ref 0–0.5)
EOSINOPHIL NFR BLD AUTO: 1.3 % — SIGNIFICANT CHANGE UP (ref 0–6)
ESTIMATED AVERAGE GLUCOSE: 126 MG/DL — HIGH (ref 68–114)
GLUCOSE SERPL-MCNC: 113 MG/DL — HIGH (ref 70–99)
HCT VFR BLD CALC: 47.7 % — HIGH (ref 34.5–45)
HDLC SERPL-MCNC: 46 MG/DL — LOW
HGB BLD-MCNC: 14.4 G/DL — SIGNIFICANT CHANGE UP (ref 11.5–15.5)
IMM GRANULOCYTES NFR BLD AUTO: 0.3 % — SIGNIFICANT CHANGE UP (ref 0–1.5)
INR BLD: 1.04 — SIGNIFICANT CHANGE UP (ref 0.88–1.16)
LIPID PNL WITH DIRECT LDL SERPL: 73 MG/DL — SIGNIFICANT CHANGE UP
LYMPHOCYTES # BLD AUTO: 1.4 K/UL — SIGNIFICANT CHANGE UP (ref 1–3.3)
LYMPHOCYTES # BLD AUTO: 17.7 % — SIGNIFICANT CHANGE UP (ref 13–44)
MCHC RBC-ENTMCNC: 27.3 PG — SIGNIFICANT CHANGE UP (ref 27–34)
MCHC RBC-ENTMCNC: 30.2 GM/DL — LOW (ref 32–36)
MCV RBC AUTO: 90.3 FL — SIGNIFICANT CHANGE UP (ref 80–100)
MONOCYTES # BLD AUTO: 0.64 K/UL — SIGNIFICANT CHANGE UP (ref 0–0.9)
MONOCYTES NFR BLD AUTO: 8.1 % — SIGNIFICANT CHANGE UP (ref 2–14)
NEUTROPHILS # BLD AUTO: 5.7 K/UL — SIGNIFICANT CHANGE UP (ref 1.8–7.4)
NEUTROPHILS NFR BLD AUTO: 72.2 % — SIGNIFICANT CHANGE UP (ref 43–77)
NON HDL CHOLESTEROL: 92 MG/DL — SIGNIFICANT CHANGE UP
NRBC # BLD: 0 /100 WBCS — SIGNIFICANT CHANGE UP (ref 0–0)
PLATELET # BLD AUTO: 255 K/UL — SIGNIFICANT CHANGE UP (ref 150–400)
POTASSIUM SERPL-MCNC: 4.7 MMOL/L — SIGNIFICANT CHANGE UP (ref 3.5–5.3)
POTASSIUM SERPL-SCNC: 4.7 MMOL/L — SIGNIFICANT CHANGE UP (ref 3.5–5.3)
PROT SERPL-MCNC: 6.8 G/DL — SIGNIFICANT CHANGE UP (ref 6–8.3)
PROTHROM AB SERPL-ACNC: 12.4 SEC — SIGNIFICANT CHANGE UP (ref 10.5–13.4)
RBC # BLD: 5.28 M/UL — HIGH (ref 3.8–5.2)
RBC # FLD: 15.7 % — HIGH (ref 10.3–14.5)
SODIUM SERPL-SCNC: 139 MMOL/L — SIGNIFICANT CHANGE UP (ref 135–145)
TRIGL SERPL-MCNC: 96 MG/DL — SIGNIFICANT CHANGE UP
WBC # BLD: 7.89 K/UL — SIGNIFICANT CHANGE UP (ref 3.8–10.5)
WBC # FLD AUTO: 7.89 K/UL — SIGNIFICANT CHANGE UP (ref 3.8–10.5)

## 2022-03-24 PROCEDURE — 93010 ELECTROCARDIOGRAM REPORT: CPT | Mod: 77

## 2022-03-24 PROCEDURE — 92943 PRQ TRLUML REVSC CH OCC ANT: CPT | Mod: RC

## 2022-03-24 PROCEDURE — 93010 ELECTROCARDIOGRAM REPORT: CPT

## 2022-03-24 PROCEDURE — 99152 MOD SED SAME PHYS/QHP 5/>YRS: CPT

## 2022-03-24 PROCEDURE — 93454 CORONARY ARTERY ANGIO S&I: CPT | Mod: 26,59

## 2022-03-24 RX ORDER — INFLUENZA VIRUS VACCINE 15; 15; 15; 15 UG/.5ML; UG/.5ML; UG/.5ML; UG/.5ML
0.5 SUSPENSION INTRAMUSCULAR ONCE
Refills: 0 | Status: COMPLETED | OUTPATIENT
Start: 2022-03-24 | End: 2022-03-24

## 2022-03-24 RX ORDER — SODIUM CHLORIDE 9 MG/ML
1000 INJECTION INTRAMUSCULAR; INTRAVENOUS; SUBCUTANEOUS
Refills: 0 | Status: DISCONTINUED | OUTPATIENT
Start: 2022-03-24 | End: 2022-03-24

## 2022-03-24 RX ORDER — CLOPIDOGREL BISULFATE 75 MG/1
75 TABLET, FILM COATED ORAL DAILY
Refills: 0 | Status: DISCONTINUED | OUTPATIENT
Start: 2022-03-25 | End: 2022-03-25

## 2022-03-24 RX ORDER — IPRATROPIUM BROMIDE 0.2 MG/ML
500 SOLUTION, NON-ORAL INHALATION ONCE
Refills: 0 | Status: COMPLETED | OUTPATIENT
Start: 2022-03-24 | End: 2022-03-24

## 2022-03-24 RX ORDER — METOPROLOL TARTRATE 50 MG
12.5 TABLET ORAL DAILY
Refills: 0 | Status: DISCONTINUED | OUTPATIENT
Start: 2022-03-25 | End: 2022-03-25

## 2022-03-24 RX ORDER — ALPRAZOLAM 0.25 MG
0.5 TABLET ORAL ONCE
Refills: 0 | Status: DISCONTINUED | OUTPATIENT
Start: 2022-03-24 | End: 2022-03-24

## 2022-03-24 RX ORDER — PANTOPRAZOLE SODIUM 20 MG/1
40 TABLET, DELAYED RELEASE ORAL DAILY
Refills: 0 | Status: DISCONTINUED | OUTPATIENT
Start: 2022-03-25 | End: 2022-03-25

## 2022-03-24 RX ORDER — ACETAMINOPHEN 500 MG
650 TABLET ORAL ONCE
Refills: 0 | Status: COMPLETED | OUTPATIENT
Start: 2022-03-24 | End: 2022-03-24

## 2022-03-24 RX ORDER — IPRATROPIUM/ALBUTEROL SULFATE 18-103MCG
3 AEROSOL WITH ADAPTER (GRAM) INHALATION ONCE
Refills: 0 | Status: COMPLETED | OUTPATIENT
Start: 2022-03-24 | End: 2022-03-24

## 2022-03-24 RX ORDER — IPRATROPIUM/ALBUTEROL SULFATE 18-103MCG
3 AEROSOL WITH ADAPTER (GRAM) INHALATION EVERY 6 HOURS
Refills: 0 | Status: DISCONTINUED | OUTPATIENT
Start: 2022-03-24 | End: 2022-03-25

## 2022-03-24 RX ORDER — ATORVASTATIN CALCIUM 80 MG/1
40 TABLET, FILM COATED ORAL AT BEDTIME
Refills: 0 | Status: DISCONTINUED | OUTPATIENT
Start: 2022-03-24 | End: 2022-03-25

## 2022-03-24 RX ORDER — ASPIRIN/CALCIUM CARB/MAGNESIUM 324 MG
81 TABLET ORAL DAILY
Refills: 0 | Status: DISCONTINUED | OUTPATIENT
Start: 2022-03-25 | End: 2022-03-25

## 2022-03-24 RX ORDER — ASPIRIN/CALCIUM CARB/MAGNESIUM 324 MG
81 TABLET ORAL ONCE
Refills: 0 | Status: COMPLETED | OUTPATIENT
Start: 2022-03-24 | End: 2022-03-24

## 2022-03-24 RX ORDER — SODIUM CHLORIDE 9 MG/ML
500 INJECTION INTRAMUSCULAR; INTRAVENOUS; SUBCUTANEOUS
Refills: 0 | Status: DISCONTINUED | OUTPATIENT
Start: 2022-03-24 | End: 2022-03-24

## 2022-03-24 RX ORDER — ALBUTEROL 90 UG/1
2.5 AEROSOL, METERED ORAL ONCE
Refills: 0 | Status: COMPLETED | OUTPATIENT
Start: 2022-03-24 | End: 2022-03-24

## 2022-03-24 RX ADMIN — SODIUM CHLORIDE 75 MILLILITER(S): 9 INJECTION INTRAMUSCULAR; INTRAVENOUS; SUBCUTANEOUS at 13:54

## 2022-03-24 RX ADMIN — Medication 650 MILLIGRAM(S): at 16:41

## 2022-03-24 RX ADMIN — Medication 0.5 MILLIGRAM(S): at 21:33

## 2022-03-24 RX ADMIN — Medication 3 MILLILITER(S): at 21:29

## 2022-03-24 RX ADMIN — Medication 81 MILLIGRAM(S): at 13:53

## 2022-03-24 RX ADMIN — Medication 500 MICROGRAM(S): at 17:30

## 2022-03-24 RX ADMIN — ATORVASTATIN CALCIUM 40 MILLIGRAM(S): 80 TABLET, FILM COATED ORAL at 21:29

## 2022-03-24 RX ADMIN — ALBUTEROL 2.5 MILLIGRAM(S): 90 AEROSOL, METERED ORAL at 17:30

## 2022-03-24 RX ADMIN — Medication 650 MILLIGRAM(S): at 18:41

## 2022-03-24 RX ADMIN — SODIUM CHLORIDE 200 MILLILITER(S): 9 INJECTION INTRAMUSCULAR; INTRAVENOUS; SUBCUTANEOUS at 16:45

## 2022-03-24 NOTE — DISCHARGE NOTE PROVIDER - NSDCMRMEDTOKEN_GEN_ALL_CORE_FT
aspirin 81 mg oral delayed release tablet: 1 tab(s) orally once a day  atorvastatin 40 mg oral tablet: 1 tab(s) orally once a day (at bedtime)  clopidogrel 75 mg oral tablet: 1 tab(s) orally once a day  pantoprazole 40 mg oral delayed release tablet: 1 tab(s) orally once a day (before a meal)  Toprol-XL 25 mg oral tablet, extended release: 0.5 tab(s) orally once a day    aspirin 81 mg oral delayed release tablet: 1 tab(s) orally once a day  atorvastatin 40 mg oral tablet: 1 tab(s) orally once a day (at bedtime)  cardiac rehab: We have provided you with a prescription for cardiac rehab which is medically supervised exercise program for your heart and has been shown to improve the quantity and quality of life of people with heart disease like yours. You should attend cardiac rehab 3 times per week for 12 weeks. We have provided you with a list of nearby facilities. Please call your insurance carrier to determine which of these facilities are covered under your plan. Please bring this prescription with you to your follow up appointment with your cardiologist who can then further assist you to enroll into a cardiac rehab program.    clopidogrel 75 mg oral tablet: 1 tab(s) orally once a day  ipratropium-albuterol 0.5 mg-2.5 mg/3 mL inhalation solution: 3 milliliter(s) inhaled every 6 hours, As Needed -for shortness of breath and/or wheezing   pantoprazole 40 mg oral delayed release tablet: 1 tab(s) orally once a day (before a meal)

## 2022-03-24 NOTE — PATIENT PROFILE ADULT - FALL HARM RISK - HARM RISK INTERVENTIONS

## 2022-03-24 NOTE — DISCHARGE NOTE PROVIDER - CARE PROVIDER_API CALL
Philip Goel)  Cardiovascular Disease; Internal Medicine; Interventional Cardiology  [address] [place] [address], [address] [place] [address]  Phone: [phone]  Fax: [phone]  Established Patient  Follow Up Time:

## 2022-03-24 NOTE — DISCHARGE NOTE PROVIDER - NSDCCPCAREPLAN_GEN_ALL_CORE_FT
PRINCIPAL DISCHARGE DIAGNOSIS  Diagnosis: CAD (coronary artery disease)  Assessment and Plan of Treatment: You have a diagnosis of coronary artery disease and underwent a cardiac catheterization. You received a stent to your Right coronary artery.  You have been started on Aspirin 81mg daily and Plavix (Clopidogrel) 75mg daily. The procedure was done through the wrist. Please avoid any heavy lifting  (no more than 3 to 5 lbs) or strenuous activity for five days. If you develop any swelling, bleeding, hardening of the skin (hematoma formation), acute pain, numbness/tingling  in your arm please contact your doctor immediately or call our 24/7 line: [phone].   NEVER MISS A DOSE OF ASPIRIN OR PLAVIX; IF YOU DO, YOU ARE AT RISK OF YOUR STENT CLOSING AND HAVING A HEART ATTACK. DO NOT STOP THESE TWO MEDICATIONS UNLESS INSTRUCTED TO DO SO BY YOUR CARDIOLOGIST.    Please make a follow up appointment with your cardiologist Dr Goel  within 1-2 weeks of your discharge. All of your prescriptions have been sent electronically to your pharmacy.  We have provided you with a prescription for cardiac rehab which is medically supervised exercise program for your heart and has been shown to improve the quantity and quality of life of people with heart disease like yours. You should attend cardiac rehab 3 times per week for 12 weeks. We have provided you with a list of nearby facilities. Please call your insurance carrier to determine which of these facilities are covered under your plan. Please bring this prescription with you to your follow up appointment with your cardiologist who can then further assist you to enroll into a cardiac rehab program.         PRINCIPAL DISCHARGE DIAGNOSIS  Diagnosis: CAD (coronary artery disease)  Assessment and Plan of Treatment: You have a diagnosis of coronary artery disease and underwent a cardiac catheterization. You received a stent to your Right coronary artery.  You have been started on Aspirin 81mg daily and Plavix (Clopidogrel) 75mg daily. The procedure was done through the wrist. Please avoid any heavy lifting  (no more than 3 to 5 lbs) or strenuous activity for five days. If you develop any swelling, bleeding, hardening of the skin (hematoma formation), acute pain, numbness/tingling  in your arm please contact your doctor immediately or call our 24/7 line: [phone].   NEVER MISS A DOSE OF ASPIRIN OR PLAVIX; IF YOU DO, YOU ARE AT RISK OF YOUR STENT CLOSING AND HAVING A HEART ATTACK. DO NOT STOP THESE TWO MEDICATIONS UNLESS INSTRUCTED TO DO SO BY YOUR CARDIOLOGIST.    Please make a follow up appointment with your cardiologist Dr Goel  within 1-2 weeks of your discharge. All of your prescriptions have been sent electronically to your pharmacy.  We have provided you with a prescription for cardiac rehab which is medically supervised exercise program for your heart and has been shown to improve the quantity and quality of life of people with heart disease like yours. You should attend cardiac rehab 3 times per week for 12 weeks. We have provided you with a list of nearby facilities. Please call your insurance carrier to determine which of these facilities are covered under your plan. Please bring this prescription with you to your follow up appointment with your cardiologist who can then further assist you to enroll into a cardiac rehab program.        SECONDARY DISCHARGE DIAGNOSES  Diagnosis: Asthma  Assessment and Plan of Treatment: You were treated with Nebulizer and the wheezing improve. Please follow up with your PCP and f/u with a pulmonologist.    Diagnosis: HTN (hypertension)  Assessment and Plan of Treatment: Please hold your Metoprolol 12.5mg daily until you follow up with Dr. Goel as it can contribute to your wheezing. For blood pressure that is too high or too low please see your doctor or go to the emergency room as necessary.      Diagnosis: Hyperlipidemia with low HDL  Assessment and Plan of Treatment: Please continue Atorvastatin 40mg at bedtime to keep your cholesterol low. High cholesterol contributes to heart disease.

## 2022-03-24 NOTE — DISCHARGE NOTE PROVIDER - NSDCFUSCHEDAPPT_GEN_ALL_CORE_FT
HARIS TO ; 04/07/2022 ; [facility] Cardio [facility] [address]  HARIS TO ; 05/09/2022 ; [facility] Gastro Doc [id number]

## 2022-03-24 NOTE — DISCHARGE NOTE PROVIDER - HOSPITAL COURSE
62 y/o female, recent former smoker (quit approximately 2-3 months ago), w/ FHx early MI and PMHx HTN, HLD, Asthma, possible COPD and sleep apnea (planned for further outpatient workup), and CAD (s/p recent PCI, see below) who initially presented to [facility] ED endorsing MAIER w/ minimal exertion, and was subsequently admitted to cardiology/telemetry and underwent cardiac catheterization w/ Impella assisted NATHANAEL x 2 proximal LAD and residual findings of mid RCA 99% stenosis. Since prior procedure, patient reports that she feels better than she previously did, but does continue to endorse some dyspnea w/ exertion that is not as severe as it was before. Patient also endorses compliance w/ DAPT. Patient denies any dizziness, syncope, palpitations, chest pain, abdominal pain, nausea/vomiting, melena, LE edema, fever, chills, cough. Echocardiogram (02/08/2022) revealed aortic sclerosis w/o significant stenosis, no significant valvular disease, EF 55%, poor endocardial delineation precluding assessment of regional wall motion abnormalities, probably apical septal akinesis, basal to mid inferolateral hypokinesis, and trivial pericardial effusion. In light of patient's risk factors, ongoing anginal equivalent symptoms, and known residual CAD, patient now presents for staged PCI.  s/p cardiac cath 3/24/22 NATHANAEL x 1 mRCA 100%, LM  short, no dz, LAD: patent stents, Lcx : LI.  Post cath SOB upon minimal ambulation, 02 80's,  required Duonebs/NC O2 2/l, no EDP measures, nl EF.  IVF post not given 2/2 wheezing and hypoxia.  wheezing resolved after duoneb and satting 96% on 2L. Pt reported remote  hx SHANKAR, unable to tolerate CPAP 2/2 to being stomach sleeper.  Recs for Pulm F/U and repeat sleep study.   Pt admitted to tele overnight for observation, VSS, labs checked, no events overnight, no complaints on day of dc. Right radial site stable without hematoma, distal pulse intact. Pt will continue with Aspirin 81mg QD, Plavix 75mg QD, toprol 25 mg QD, atorvastatin 40 mg QHS.  Pt given appropriate d/c instructions and verbalized understanding. Medications sent to preferred pharmacy. Pt deemed stable for d/c per Dr. Sen and will f/u with Dr. Goel in 1-2 weeks.  Cardiac Rehab (STEMI/NSTEMI/ACS/Unstable Angina/CHF/Chronic Stable Angina/Heart Surgery (CABG,Valve)/Post PCI):              *Education on benefits of Cardiac Rehab provided to patient: Yes         *Referral and Prescription Given for Cardiac Rehab : Yes         *Pt given list of locations & instructed to contact their insurance company to review list of participating providers          *Pt instructed to bring Cardiac Rehab prescription with them to Cardiology Follow up appointment for assistance with enrollment: Yes             60 y/o female, recent former smoker (quit approximately 2-3 months ago), w/ FHx early MI and PMHx HTN, HLD, Asthma, possible COPD and sleep apnea (planned for further outpatient workup), and CAD (s/p recent PCI, see below) who initially presented to [facility] ED endorsing MAIER w/ minimal exertion, and was subsequently admitted to cardiology/telemetry and underwent cardiac catheterization w/ Impella assisted NATHANAEL x 2 proximal LAD and residual findings of mid RCA 99% stenosis. Since prior procedure, patient reports that she feels better than she previously did, but does continue to endorse some dyspnea w/ exertion that is not as severe as it was before. Patient also endorses compliance w/ DAPT. Patient denies any dizziness, syncope, palpitations, chest pain, abdominal pain, nausea/vomiting, melena, LE edema, fever, chills, cough. Echocardiogram (02/08/2022) revealed aortic sclerosis w/o significant stenosis, no significant valvular disease, EF 55%, poor endocardial delineation precluding assessment of regional wall motion abnormalities, probably apical septal akinesis, basal to mid inferolateral hypokinesis, and trivial pericardial effusion. In light of patient's risk factors, ongoing anginal equivalent symptoms, and known residual CAD, patient now presents for staged PCI.  s/p cardiac cath 3/24/22 NATHANAEL x 1 mRCA 100%, LM  short, no dz, LAD: patent stents, Lcx : LI.  Post cath SOB upon minimal ambulation, 02 80's,  required Duonebs/NC O2 2/l, no EDP measures, nl EF.  IVF post not given 2/2 wheezing and hypoxia.  wheezing resolved after duoneb and satting 96% on 2L. Pt reported remote  hx SHANKAR, unable to tolerate CPAP 2/2 to being stomach sleeper.  Recs for Pulm F/U and repeat sleep study. Pt stated she would follow up with her PCP, Dr. Gardner and asked him for a referral for pulm.   Pt admitted to tele overnight for observation, VSS, labs checked, no events overnight, no complaints on day of dc. Right radial site stable without hematoma, distal pulse intact. Pt will continue with Aspirin 81mg QD, Plavix 75mg QD, toprol 25 mg QD, atorvastatin 40 mg QHS.  Pt given appropriate d/c instructions and verbalized understanding. Medications sent to preferred pharmacy. Pt deemed stable for d/c per Dr. Sen and will f/u with Dr. Goel in 1-2 weeks.  Cardiac Rehab (STEMI/NSTEMI/ACS/Unstable Angina/CHF/Chronic Stable Angina/Heart Surgery (CABG,Valve)/Post PCI):              *Education on benefits of Cardiac Rehab provided to patient: Yes         *Referral and Prescription Given for Cardiac Rehab : Yes         *Pt given list of locations & instructed to contact their insurance company to review list of participating providers          *Pt instructed to bring Cardiac Rehab prescription with them to Cardiology Follow up appointment for assistance with enrollment: Yes             60 y/o female, recent former smoker (quit approximately 2-3 months ago), w/ FHx early MI and PMHx HTN, HLD, Asthma, possible COPD and sleep apnea (planned for further outpatient workup), and CAD (s/p recent PCI, see below) who initially presented to [facility] ED endorsing MAIER w/ minimal exertion, and was subsequently admitted to cardiology/telemetry and underwent cardiac catheterization w/ Impella assisted NATHANAEL x 2 proximal LAD and residual findings of mid RCA 99% stenosis. Since prior procedure, patient reports that she feels better than she previously did, but does continue to endorse some dyspnea w/ exertion that is not as severe as it was before. Patient also endorses compliance w/ DAPT. Patient denies any dizziness, syncope, palpitations, chest pain, abdominal pain, nausea/vomiting, melena, LE edema, fever, chills, cough. Echocardiogram (02/08/2022) revealed aortic sclerosis w/o significant stenosis, no significant valvular disease, EF 55%, poor endocardial delineation precluding assessment of regional wall motion abnormalities, probably apical septal akinesis, basal to mid inferolateral hypokinesis, and trivial pericardial effusion. In light of patient's risk factors, ongoing anginal equivalent symptoms, and known residual CAD, patient now presents for staged PCI.  s/p cardiac cath 3/24/22 NATHANAEL x 1 mRCA 100%, LM  short, no dz, LAD: patent stents, Lcx : LI.  Post cath SOB upon minimal ambulation, 02 80's,  required Duonebs/NC O2 2/l, no EDP measures, nl EF.  IVF post not given 2/2 wheezing and hypoxia.  wheezing resolved after duoneb and satting 96% on 2L. Pt reported remote  hx SHANKAR, unable to tolerate CPAP 2/2 to being stomach sleeper.  Recs for Pulm F/U and repeat sleep study. Pt stated she would follow up with her PCP, Dr. Gardner and asked him for a referral for pulm.   Pt admitted to tele overnight for observation, VSS, labs checked, no events overnight, no complaints on day of dc. Right radial site stable without hematoma, distal pulse intact. Pt will continue with Aspirin 81mg QD, Plavix 75mg QD, atorvastatin 40 mg QHS. Pt was instructed to hold Toprol 25mg QD for now until she follow up with Dr. Goel due to wheezing. Pt given appropriate d/c instructions and verbalized understanding. Medications sent to preferred pharmacy. Pt deemed stable for d/c per Dr. Sen and will f/u with Dr. Goel in 1-2 weeks.  Cardiac Rehab (STEMI/NSTEMI/ACS/Unstable Angina/CHF/Chronic Stable Angina/Heart Surgery (CABG,Valve)/Post PCI):              *Education on benefits of Cardiac Rehab provided to patient: Yes         *Referral and Prescription Given for Cardiac Rehab : Yes         *Pt given list of locations & instructed to contact their insurance company to review list of participating providers          *Pt instructed to bring Cardiac Rehab prescription with them to Cardiology Follow up appointment for assistance with enrollment: Yes

## 2022-03-25 ENCOUNTER — TRANSCRIPTION ENCOUNTER (OUTPATIENT)
Age: 62
End: 2022-03-25

## 2022-03-25 VITALS — TEMPERATURE: 98 F

## 2022-03-25 LAB
ANION GAP SERPL CALC-SCNC: 9 MMOL/L — SIGNIFICANT CHANGE UP (ref 5–17)
BUN SERPL-MCNC: 17 MG/DL — SIGNIFICANT CHANGE UP (ref 7–23)
CALCIUM SERPL-MCNC: 8.8 MG/DL — SIGNIFICANT CHANGE UP (ref 8.4–10.5)
CHLORIDE SERPL-SCNC: 101 MMOL/L — SIGNIFICANT CHANGE UP (ref 96–108)
CO2 SERPL-SCNC: 30 MMOL/L — SIGNIFICANT CHANGE UP (ref 22–31)
CREAT SERPL-MCNC: 0.61 MG/DL — SIGNIFICANT CHANGE UP (ref 0.5–1.3)
EGFR: 100 ML/MIN/1.73M2 — SIGNIFICANT CHANGE UP
GLUCOSE SERPL-MCNC: 105 MG/DL — HIGH (ref 70–99)
HCT VFR BLD CALC: 46.1 % — HIGH (ref 34.5–45)
HGB BLD-MCNC: 13.6 G/DL — SIGNIFICANT CHANGE UP (ref 11.5–15.5)
MAGNESIUM SERPL-MCNC: 2.2 MG/DL — SIGNIFICANT CHANGE UP (ref 1.6–2.6)
MCHC RBC-ENTMCNC: 27.5 PG — SIGNIFICANT CHANGE UP (ref 27–34)
MCHC RBC-ENTMCNC: 29.5 GM/DL — LOW (ref 32–36)
MCV RBC AUTO: 93.1 FL — SIGNIFICANT CHANGE UP (ref 80–100)
NRBC # BLD: 0 /100 WBCS — SIGNIFICANT CHANGE UP (ref 0–0)
PLATELET # BLD AUTO: 209 K/UL — SIGNIFICANT CHANGE UP (ref 150–400)
POTASSIUM SERPL-MCNC: 4.2 MMOL/L — SIGNIFICANT CHANGE UP (ref 3.5–5.3)
POTASSIUM SERPL-SCNC: 4.2 MMOL/L — SIGNIFICANT CHANGE UP (ref 3.5–5.3)
RBC # BLD: 4.95 M/UL — SIGNIFICANT CHANGE UP (ref 3.8–5.2)
RBC # FLD: 15.7 % — HIGH (ref 10.3–14.5)
SODIUM SERPL-SCNC: 140 MMOL/L — SIGNIFICANT CHANGE UP (ref 135–145)
WBC # BLD: 7.33 K/UL — SIGNIFICANT CHANGE UP (ref 3.8–10.5)
WBC # FLD AUTO: 7.33 K/UL — SIGNIFICANT CHANGE UP (ref 3.8–10.5)

## 2022-03-25 PROCEDURE — 82248 BILIRUBIN DIRECT: CPT

## 2022-03-25 PROCEDURE — 99239 HOSP IP/OBS DSCHRG MGMT >30: CPT

## 2022-03-25 PROCEDURE — 36415 COLL VENOUS BLD VENIPUNCTURE: CPT

## 2022-03-25 PROCEDURE — 94640 AIRWAY INHALATION TREATMENT: CPT

## 2022-03-25 PROCEDURE — C1769: CPT

## 2022-03-25 PROCEDURE — C1874: CPT

## 2022-03-25 PROCEDURE — 85027 COMPLETE CBC AUTOMATED: CPT

## 2022-03-25 PROCEDURE — 85025 COMPLETE CBC W/AUTO DIFF WBC: CPT

## 2022-03-25 PROCEDURE — 80048 BASIC METABOLIC PNL TOTAL CA: CPT

## 2022-03-25 PROCEDURE — 80061 LIPID PANEL: CPT

## 2022-03-25 PROCEDURE — 85610 PROTHROMBIN TIME: CPT

## 2022-03-25 PROCEDURE — C1887: CPT

## 2022-03-25 PROCEDURE — 85730 THROMBOPLASTIN TIME PARTIAL: CPT

## 2022-03-25 PROCEDURE — 93005 ELECTROCARDIOGRAM TRACING: CPT

## 2022-03-25 PROCEDURE — C1894: CPT

## 2022-03-25 PROCEDURE — 83036 HEMOGLOBIN GLYCOSYLATED A1C: CPT

## 2022-03-25 PROCEDURE — 83735 ASSAY OF MAGNESIUM: CPT

## 2022-03-25 PROCEDURE — 80053 COMPREHEN METABOLIC PANEL: CPT

## 2022-03-25 PROCEDURE — C1725: CPT

## 2022-03-25 RX ORDER — ACETAMINOPHEN 500 MG
650 TABLET ORAL EVERY 6 HOURS
Refills: 0 | Status: DISCONTINUED | OUTPATIENT
Start: 2022-03-25 | End: 2022-03-25

## 2022-03-25 RX ORDER — IPRATROPIUM/ALBUTEROL SULFATE 18-103MCG
3 AEROSOL WITH ADAPTER (GRAM) INHALATION
Qty: 84 | Refills: 0
Start: 2022-03-25 | End: 2022-03-31

## 2022-03-25 RX ORDER — ACETAMINOPHEN 500 MG
650 TABLET ORAL ONCE
Refills: 0 | Status: COMPLETED | OUTPATIENT
Start: 2022-03-25 | End: 2022-03-25

## 2022-03-25 RX ORDER — ASPIRIN/CALCIUM CARB/MAGNESIUM 324 MG
1 TABLET ORAL
Qty: 30 | Refills: 11
Start: 2022-03-25 | End: 2023-03-19

## 2022-03-25 RX ORDER — CLOPIDOGREL BISULFATE 75 MG/1
1 TABLET, FILM COATED ORAL
Qty: 30 | Refills: 11
Start: 2022-03-25 | End: 2023-03-19

## 2022-03-25 RX ADMIN — Medication 650 MILLIGRAM(S): at 08:24

## 2022-03-25 RX ADMIN — Medication 650 MILLIGRAM(S): at 13:18

## 2022-03-25 RX ADMIN — CLOPIDOGREL BISULFATE 75 MILLIGRAM(S): 75 TABLET, FILM COATED ORAL at 12:35

## 2022-03-25 RX ADMIN — PANTOPRAZOLE SODIUM 40 MILLIGRAM(S): 20 TABLET, DELAYED RELEASE ORAL at 12:35

## 2022-03-25 RX ADMIN — Medication 3 MILLILITER(S): at 02:18

## 2022-03-25 RX ADMIN — Medication 81 MILLIGRAM(S): at 12:35

## 2022-03-25 RX ADMIN — Medication 12.5 MILLIGRAM(S): at 05:24

## 2022-03-25 RX ADMIN — Medication 650 MILLIGRAM(S): at 07:29

## 2022-03-25 RX ADMIN — Medication 3 MILLILITER(S): at 09:42

## 2022-03-25 NOTE — DISCHARGE NOTE NURSING/CASE MANAGEMENT/SOCIAL WORK - PATIENT PORTAL LINK FT
You can access the FollowMyHealth Patient Portal offered by [facility] by registering at the following website: [email or web address]. By joining [employer]’s FollowMyHealth portal, you will also be able to view your health information using other applications (apps) compatible with our system.

## 2022-03-25 NOTE — DISCHARGE NOTE NURSING/CASE MANAGEMENT/SOCIAL WORK - NSDCPEFALRISK_GEN_ALL_CORE
For information on Fall & Injury Prevention, visit: [email or web address] OR  [email or web address] OR  https://www.cdc.gov/steadi/patient.html

## 2022-03-30 DIAGNOSIS — I10 ESSENTIAL (PRIMARY) HYPERTENSION: ICD-10-CM

## 2022-03-30 DIAGNOSIS — Z90.49 ACQUIRED ABSENCE OF OTHER SPECIFIED PARTS OF DIGESTIVE TRACT: ICD-10-CM

## 2022-03-30 DIAGNOSIS — I25.82 CHRONIC TOTAL OCCLUSION OF CORONARY ARTERY: ICD-10-CM

## 2022-03-30 DIAGNOSIS — G47.33 OBSTRUCTIVE SLEEP APNEA (ADULT) (PEDIATRIC): ICD-10-CM

## 2022-03-30 DIAGNOSIS — I25.10 ATHEROSCLEROTIC HEART DISEASE OF NATIVE CORONARY ARTERY WITHOUT ANGINA PECTORIS: ICD-10-CM

## 2022-03-30 DIAGNOSIS — Z87.891 PERSONAL HISTORY OF NICOTINE DEPENDENCE: ICD-10-CM

## 2022-03-30 DIAGNOSIS — I45.10 UNSPECIFIED RIGHT BUNDLE-BRANCH BLOCK: ICD-10-CM

## 2022-03-30 DIAGNOSIS — J44.9 CHRONIC OBSTRUCTIVE PULMONARY DISEASE, UNSPECIFIED: ICD-10-CM

## 2022-03-30 DIAGNOSIS — Z95.5 PRESENCE OF CORONARY ANGIOPLASTY IMPLANT AND GRAFT: ICD-10-CM

## 2022-04-08 DIAGNOSIS — Z87.09 PERSONAL HISTORY OF OTHER DISEASES OF THE RESPIRATORY SYSTEM: ICD-10-CM

## 2022-04-08 RX ORDER — FUROSEMIDE 20 MG/1
20 TABLET ORAL
Qty: 3 | Refills: 0 | Status: DISCONTINUED | COMMUNITY
Start: 2022-04-05 | End: 2022-04-08

## 2022-04-08 RX ORDER — NEBULIZER ACCESSORIES
KIT MISCELLANEOUS
Qty: 1 | Refills: 0 | Status: ACTIVE | COMMUNITY
Start: 2022-04-08 | End: 1900-01-01

## 2022-04-14 ENCOUNTER — APPOINTMENT (OUTPATIENT)
Dept: HEART AND VASCULAR | Facility: CLINIC | Age: 62
End: 2022-04-14

## 2022-04-22 PROBLEM — E78.5 HYPERLIPIDEMIA, UNSPECIFIED: Chronic | Status: ACTIVE | Noted: 2022-03-18

## 2022-04-22 PROBLEM — I25.10 ATHEROSCLEROTIC HEART DISEASE OF NATIVE CORONARY ARTERY WITHOUT ANGINA PECTORIS: Chronic | Status: ACTIVE | Noted: 2022-03-18

## 2022-05-05 ENCOUNTER — APPOINTMENT (OUTPATIENT)
Dept: HEART AND VASCULAR | Facility: CLINIC | Age: 62
End: 2022-05-05
Payer: COMMERCIAL

## 2022-05-05 VITALS
WEIGHT: 254 LBS | SYSTOLIC BLOOD PRESSURE: 131 MMHG | DIASTOLIC BLOOD PRESSURE: 62 MMHG | HEART RATE: 66 BPM | BODY MASS INDEX: 46.74 KG/M2 | OXYGEN SATURATION: 89 % | HEIGHT: 62 IN

## 2022-05-05 DIAGNOSIS — I10 ESSENTIAL (PRIMARY) HYPERTENSION: ICD-10-CM

## 2022-05-05 PROCEDURE — 99214 OFFICE O/P EST MOD 30 MIN: CPT

## 2022-05-05 RX ORDER — VALSARTAN AND HYDROCHLOROTHIAZIDE 160; 25 MG/1; MG/1
160-25 TABLET, FILM COATED ORAL
Qty: 90 | Refills: 0 | Status: DISCONTINUED | COMMUNITY
Start: 2022-01-31 | End: 2022-05-05

## 2022-05-05 RX ORDER — METOPROLOL SUCCINATE 25 MG/1
25 TABLET, EXTENDED RELEASE ORAL
Qty: 30 | Refills: 3 | Status: DISCONTINUED | COMMUNITY
Start: 2022-03-10 | End: 2022-05-05

## 2022-05-09 ENCOUNTER — APPOINTMENT (OUTPATIENT)
Dept: GASTROENTEROLOGY | Facility: CLINIC | Age: 62
End: 2022-05-09

## 2022-05-16 NOTE — REASON FOR VISIT
[FreeTextEntry1] : 61-year-old female recent smoker with past medical history of CAD status post PCI on 2/9/2022  and 3/24/ 2022 here for follow up. \par \par The patient reports that she has lower extremity swelling but her exertional dyspnea has improved. No chest pain or shortness of breath, but she does note persistent hypoxia. She also reports that her blood pressure measurement at home has been elevated as well. She is status post repeat PCI and reports compliance with all her medications. \par \par She denies chest pain, palpitations, and orthopnea. \par \par Discharge medications: ASA 81mg QD, Plavix 75mg QD, Lipitor 40mg HS, and Toprol \par 12.5mg QD. \par \par Echocardiogram 3/10/2022\par EF 55%\par Basal to mid inferolateral wall hypokinesis\par \par Cardiac catheterization 2/9/2022\par Impella supported PCI of 99% proximal LAD with a two 5 x 38 and 3 oh by 23 Xience postdilated to 3.5\par Left main: Normal\par LAD proximal 99% status post PCI\par L circumflex 40%\par RCA: 99%\par \par Labs 2/7/2022\par Creatinine 1.0\par \par EKG 2/7/2022\par Sinus bradycardia, Right bundle branch block

## 2022-05-16 NOTE — ASSESSMENT
[FreeTextEntry1] : 61-year-old female recent smoker with past medical history of CAD status post PCI on 2/9/2022 and 3/20/2022 here for follow-up\par \par CAD s/p PCI mLAD and mRCA\par -The patient denies chest pain and reports improved exertional dyspnea\par -TTE 2/2022 with normal LV systolic function \par - c/w ASA and Plavix\par - c/w Atorva 40\par - unable to tolerate BB due to bradycardia, will revisit next office visit \par \par HTN\par -Well-controlled\par -Continue with HCTZ 25\par - record BP daily, report to office if SBP > 140 \par \par HLD\par -Continue with atorvastatin 40 mg daily\par \par RTC 3 months or sooner if any symptoms

## 2022-05-16 NOTE — HISTORY OF PRESENT ILLNESS
[FreeTextEntry1] : 61-year-old female, former smoker with past medical history of CAD status post PCI on 2/9/2022 \par Here for follow-up after staged PCI on 3/24/2022\par The patient reports feeling fine.\par She denies chest pain, shortness of breath, palpitations, syncope, presyncope.\par She reports compliance with her medications\par \par \par \par \par Cardiac catheterization 3/24/2022\par Successful PCI of mRCA recanalized  with 3.0x38mm NATHANAEL followed by post dilation with 3.0mm NC balloon, there was a jailed acute marginal branch that was pinched and lost flow, which was ballooned to restore KIZZY 3 flow\par  Patent recently placed LAD stents \par LM:  it originates from the left coronary cusp, no\par significant stenosis, Short LM.\par LAD: patent recently placed stents.  \par CX: luminal irregularities.  \par RCA: co dominant, mRCA has  with L-R and R-R colaterals.\par \par Discharge medications: ASA 81mg QD, Plavix 75mg QD, Lipitor 40mg HS, and Toprol \par 12.5mg QD. \par \par Echocardiogram 3/10/2022\par EF 55%\par Basal to mid inferolateral wall hypokinesis\par \par Cardiac catheterization 2/9/2022\par Impella supported PCI of 99% proximal LAD with a two 5 x 38 and 3 oh by 23 Xience postdilated to 3.5\par Left main: Normal\par LAD proximal 99% status post PCI\par L circumflex 40%\par RCA: 99%\par \par Labs 2/7/2022\par Creatinine 1.0\par \par EKG 2/7/2022\par Sinus bradycardia, Right bundle branch block\par \par \par \par \par \par \par \par \par \par \par \par \par \par

## 2022-05-16 NOTE — PHYSICAL EXAM

## 2022-05-17 ENCOUNTER — NON-APPOINTMENT (OUTPATIENT)
Age: 62
End: 2022-05-17

## 2022-05-25 ENCOUNTER — NON-APPOINTMENT (OUTPATIENT)
Age: 62
End: 2022-05-25

## 2022-06-07 ENCOUNTER — APPOINTMENT (OUTPATIENT)
Dept: SURGERY | Facility: CLINIC | Age: 62
End: 2022-06-07
Payer: COMMERCIAL

## 2022-06-07 VITALS
OXYGEN SATURATION: 93 % | HEIGHT: 62 IN | WEIGHT: 254 LBS | HEART RATE: 87 BPM | TEMPERATURE: 97.1 F | SYSTOLIC BLOOD PRESSURE: 138 MMHG | BODY MASS INDEX: 46.74 KG/M2 | DIASTOLIC BLOOD PRESSURE: 77 MMHG

## 2022-06-07 DIAGNOSIS — R22.32 LOCALIZED SWELLING, MASS AND LUMP, LEFT UPPER LIMB: ICD-10-CM

## 2022-06-07 PROCEDURE — 99212 OFFICE O/P EST SF 10 MIN: CPT

## 2022-06-07 NOTE — DATA REVIEWED
[FreeTextEntry1] : Patient had an MRI of the elbow region which showed a cystic structure possibly a ganglion cyst.  It is also reported that it is not involving the ulnar nerve.

## 2022-06-07 NOTE — ASSESSMENT
[FreeTextEntry1] : After examination the structure appears to be separate from the triceps muscle or the bone and is unlikely to be attached to the tendon or the joint.  Patient was explained about the findings of the MRI and the description.  She was given options of seeing an orthopedist for further clarification.  Follow-up in the office as needed.

## 2022-06-09 ENCOUNTER — APPOINTMENT (OUTPATIENT)
Dept: HEART AND VASCULAR | Facility: CLINIC | Age: 62
End: 2022-06-09

## 2022-08-15 ENCOUNTER — APPOINTMENT (OUTPATIENT)
Dept: HEART AND VASCULAR | Facility: CLINIC | Age: 62
End: 2022-08-15

## 2022-09-16 ENCOUNTER — APPOINTMENT (OUTPATIENT)
Dept: HEART AND VASCULAR | Facility: CLINIC | Age: 62
End: 2022-09-16

## 2022-10-07 ENCOUNTER — NON-APPOINTMENT (OUTPATIENT)
Age: 62
End: 2022-10-07

## 2022-10-07 ENCOUNTER — LABORATORY RESULT (OUTPATIENT)
Age: 62
End: 2022-10-07

## 2022-10-07 ENCOUNTER — APPOINTMENT (OUTPATIENT)
Dept: HEART AND VASCULAR | Facility: CLINIC | Age: 62
End: 2022-10-07

## 2022-10-07 VITALS
OXYGEN SATURATION: 89 % | SYSTOLIC BLOOD PRESSURE: 137 MMHG | TEMPERATURE: 97.6 F | DIASTOLIC BLOOD PRESSURE: 84 MMHG | BODY MASS INDEX: 44.37 KG/M2 | HEIGHT: 62 IN | HEART RATE: 71 BPM | WEIGHT: 241.13 LBS

## 2022-10-07 DIAGNOSIS — Z72.0 TOBACCO USE: ICD-10-CM

## 2022-10-07 DIAGNOSIS — Z01.810 ENCOUNTER FOR PREPROCEDURAL CARDIOVASCULAR EXAMINATION: ICD-10-CM

## 2022-10-07 PROCEDURE — 99214 OFFICE O/P EST MOD 30 MIN: CPT | Mod: 25

## 2022-10-07 PROCEDURE — 99406 BEHAV CHNG SMOKING 3-10 MIN: CPT

## 2022-10-07 PROCEDURE — 93000 ELECTROCARDIOGRAM COMPLETE: CPT

## 2022-10-07 RX ORDER — HYDROCHLOROTHIAZIDE 25 MG/1
25 TABLET ORAL
Qty: 90 | Refills: 3 | Status: ACTIVE | COMMUNITY
Start: 2022-05-05 | End: 1900-01-01

## 2022-10-07 RX ORDER — ASPIRIN 81 MG/1
81 TABLET, COATED ORAL
Qty: 90 | Refills: 3 | Status: ACTIVE | COMMUNITY
Start: 2022-02-10 | End: 1900-01-01

## 2022-10-07 RX ORDER — ATORVASTATIN CALCIUM 40 MG/1
40 TABLET, FILM COATED ORAL
Qty: 90 | Refills: 3 | Status: ACTIVE | COMMUNITY
Start: 2022-02-10 | End: 1900-01-01

## 2022-10-07 RX ORDER — CLOPIDOGREL BISULFATE 75 MG/1
75 TABLET, FILM COATED ORAL
Qty: 90 | Refills: 3 | Status: ACTIVE | COMMUNITY
Start: 2022-02-10 | End: 1900-01-01

## 2022-10-07 NOTE — REASON FOR VISIT
[FreeTextEntry1] : 62 year old woman current smoker trying to quit with h/o CAD s/p PCI NATHANAEL LAD and RCA most recent PCI 3/24/2022,  HTN, HLD, preDM here for pre-operative evaluation, prior patient of Dr. Goel. Since last visit, dyspnea on exertion has improved, now able to walk 4 to 5 blocks without symptoms. Denies chest pain, syncope/presycope, palpitations, orthopnea or PND. Chronic BLEE stable. She is adherent to DAPT and HCTZ, however did not take HCTZ this morning as she was traveling to clinic. Denies any issues with bleeding. \par \par She discovered recently that her ovarian cyst has significantly increased in size and Gyn recommended removal due to concern for malignancy. Per patient, surgery is urgent due to encroachment of cyst into surrounding structures and possible malignancy.\par \par \par Cardiac catheterization 3/24/2022\par Successful PCI of mRCA recanalized  with 3.0x38mm NATHANAEL followed by post dilation with 3.0mm NC balloon, there was a jailed acute marginal branch that was pinched and lost flow, which was ballooned to restore KIZZY 3 flow\par Patent recently placed LAD stents \par LM:  it originates from the left coronary cusp, no\par significant stenosis, Short LM.\par LAD: patent recently placed stents.  \par CX: luminal irregularities.  \par RCA: co dominant, mRCA has  with L-R and R-R colaterals.\par \par Echocardiogram 3/10/2022\par EF 55%\par Basal to mid inferolateral wall hypokinesis\par \par Cardiac catheterization 2/9/2022\par Impella supported PCI of 99% proximal LAD with a two 5 x 38 and 3 oh by 23 Xience postdilated to 3.5\par Left main: Normal\par LAD proximal 99% status post PCI\par L circumflex 40%\par RCA: 99%\par

## 2022-10-07 NOTE — PHYSICAL EXAM
[Well Developed] : well developed [No Acute Distress] : no acute distress [Normal Venous Pressure] : normal venous pressure [No Carotid Bruit] : no carotid bruit [Normal S1, S2] : normal S1, S2 [No Murmur] : no murmur [No Rub] : no rub [No Gallop] : no gallop [Soft] : abdomen soft [Non Tender] : non-tender [Normal Gait] : normal gait [No Cyanosis] : no cyanosis [No Clubbing] : no clubbing [Normal Speech] : normal speech [Alert and Oriented] : alert and oriented [de-identified] : Fine expiratory wheezes across all lung fields, no crackles, rales or rhonchi [de-identified] : +1 pitting BLE edema

## 2022-10-07 NOTE — ASSESSMENT
[FreeTextEntry1] : 61 year old woman current smoker trying to quit with h/o CAD s/p PCI NATHANAEL LAD and RCA, residual nonobstructive 40% LCx most recent PCI 3/24/2022,  HTN, HLD, preDM, obesity here for pre-operative evaluation and to establish care, prior patient of Dr. Goel.\par \par #Pre-operative evaluation: Patent stents with residual nonobstructive LCx disease. Patient is asymptomatic with good exertional tolerance, euvolemic on exam today. RCRI is 1 for intraperitoneal surgery, 6% rachel-operative 30-day risk of MACE and mortality. We discussed risk-benefits of temporary cessation of Plavix for urgent surgery, given patient is 6 months post-PCI with NATHANAEL. No further cardiac testing required prior to indicated surgery. \par - May stop plavix 5 days prior to surgery and restart as soon as able per Gyn, continue ASA throughout surgery\par - Labs today including CBC, CMP, T&S, PT/PTT as requested\par - EKG sinus rhythm RBBB\par \par #CAD s/p PCI mLAD and mRCA: Patient denies chest pain and reports improved exertional dyspnea\par - TTE 2/2022 with normal LV systolic function, will repeat TTE with contrast at next visit\par - Continue ASA and Plavix\par - Continue atorvastatin\par - Metoprolol was discontinued previously due to bradycardia, will reassess at next visit\par \par #HTN: Close to goal, did not take HCTZ today\par - Continue with HCTZ 25\par - Record BP daily, report to office if SBP > 140 \par \par #HLD\par - Continue with atorvastatin 40 mg daily\par - Repeat lipids today\par \par #Chronic hypoxia: Most likely due to obstructive lung disease, current smoker and expiratory wheezing on exam. \par - Referred to Smoking Cessation Program\par - Counseled patient to use albuterol for wheezing and dyspnea\par - Recommended patient to follow-up with Pulmonology, recommend PFTs\par

## 2022-10-08 LAB
ABO + RH PNL BLD: NORMAL
ALBUMIN SERPL ELPH-MCNC: 4.4 G/DL
ALP BLD-CCNC: 98 U/L
ALT SERPL-CCNC: 20 U/L
ANION GAP SERPL CALC-SCNC: 14 MMOL/L
APTT BLD: 33.4 SEC
AST SERPL-CCNC: 30 U/L
BASOPHILS # BLD AUTO: 0.04 K/UL
BASOPHILS NFR BLD AUTO: 0.7 %
BILIRUB SERPL-MCNC: 0.4 MG/DL
BUN SERPL-MCNC: 20 MG/DL
CALCIUM SERPL-MCNC: 9.4 MG/DL
CHLORIDE SERPL-SCNC: 99 MMOL/L
CHOLEST SERPL-MCNC: 217 MG/DL
CO2 SERPL-SCNC: 27 MMOL/L
CREAT SERPL-MCNC: 0.49 MG/DL
EGFR: 105 ML/MIN/1.73M2
EOSINOPHIL # BLD AUTO: 0.1 K/UL
EOSINOPHIL NFR BLD AUTO: 1.9 %
GLUCOSE SERPL-MCNC: 91 MG/DL
HCT VFR BLD CALC: 47.8 %
HDLC SERPL-MCNC: 60 MG/DL
HGB BLD-MCNC: 15.1 G/DL
IMM GRANULOCYTES NFR BLD AUTO: 0.2 %
INR PPP: 0.98 RATIO
LDLC SERPL CALC-MCNC: 134 MG/DL
LYMPHOCYTES # BLD AUTO: 1.33 K/UL
LYMPHOCYTES NFR BLD AUTO: 24.7 %
MAN DIFF?: NORMAL
MCHC RBC-ENTMCNC: 30.9 PG
MCHC RBC-ENTMCNC: 31.6 GM/DL
MCV RBC AUTO: 97.8 FL
MONOCYTES # BLD AUTO: 0.35 K/UL
MONOCYTES NFR BLD AUTO: 6.5 %
NEUTROPHILS # BLD AUTO: 3.55 K/UL
NEUTROPHILS NFR BLD AUTO: 66 %
NONHDLC SERPL-MCNC: 157 MG/DL
PLATELET # BLD AUTO: 70 K/UL
POTASSIUM SERPL-SCNC: 4.9 MMOL/L
PROT SERPL-MCNC: 6.8 G/DL
PT BLD: 11.4 SEC
RBC # BLD: 4.89 M/UL
RBC # FLD: 14.5 %
SODIUM SERPL-SCNC: 140 MMOL/L
TRIGL SERPL-MCNC: 113 MG/DL
TSH SERPL-ACNC: 1.8 UIU/ML
WBC # FLD AUTO: 5.38 K/UL

## 2023-02-22 NOTE — ED ADULT NURSE NOTE - NSICDXPASTMEDICALHX_GEN_ALL_CORE_FT
Airway  Urgency: elective    Date/Time: 2/22/2023 11:28 AM  Airway not difficult    General Information and Staff    Patient location during procedure: OR  Anesthesiologist: Joe Yoder MD  CRNA/CAA: Araceli Arzola CRNA    Indications and Patient Condition  Indications for airway management: airway protection    Preoxygenated: yes  Mask difficulty assessment: 0 - not attempted    Final Airway Details  Final airway type: supraglottic airway      Successful airway: classic  Size 4     Number of attempts at approach: 1  Assessment: lips, teeth, and gum same as pre-op          
PAST MEDICAL HISTORY:  Chronic obstructive pulmonary disease (COPD)     HTN (hypertension)

## 2023-11-13 ENCOUNTER — APPOINTMENT (OUTPATIENT)
Dept: PULMONOLOGY | Facility: CLINIC | Age: 63
End: 2023-11-13
Payer: MEDICARE

## 2023-11-13 VITALS
BODY MASS INDEX: 50.61 KG/M2 | HEIGHT: 62 IN | HEART RATE: 63 BPM | DIASTOLIC BLOOD PRESSURE: 72 MMHG | SYSTOLIC BLOOD PRESSURE: 140 MMHG | OXYGEN SATURATION: 97 % | WEIGHT: 275 LBS

## 2023-11-13 DIAGNOSIS — Z12.2 ENCOUNTER FOR SCREENING FOR MALIGNANT NEOPLASM OF RESPIRATORY ORGANS: ICD-10-CM

## 2023-11-13 DIAGNOSIS — Z86.79 PERSONAL HISTORY OF OTHER DISEASES OF THE CIRCULATORY SYSTEM: ICD-10-CM

## 2023-11-13 DIAGNOSIS — Z87.891 PERSONAL HISTORY OF NICOTINE DEPENDENCE: ICD-10-CM

## 2023-11-13 DIAGNOSIS — J44.9 CHRONIC OBSTRUCTIVE PULMONARY DISEASE, UNSPECIFIED: ICD-10-CM

## 2023-11-13 DIAGNOSIS — G47.33 OBSTRUCTIVE SLEEP APNEA (ADULT) (PEDIATRIC): ICD-10-CM

## 2023-11-13 DIAGNOSIS — J96.11 CHRONIC RESPIRATORY FAILURE WITH HYPOXIA: ICD-10-CM

## 2023-11-13 DIAGNOSIS — Z86.39 PERSONAL HISTORY OF OTHER ENDOCRINE, NUTRITIONAL AND METABOLIC DISEASE: ICD-10-CM

## 2023-11-13 DIAGNOSIS — I25.10 ATHEROSCLEROTIC HEART DISEASE OF NATIVE CORONARY ARTERY W/OUT ANGINA PECTORIS: ICD-10-CM

## 2023-11-13 DIAGNOSIS — E66.01 MORBID (SEVERE) OBESITY DUE TO EXCESS CALORIES: ICD-10-CM

## 2023-11-13 PROCEDURE — 99214 OFFICE O/P EST MOD 30 MIN: CPT | Mod: 25

## 2023-11-13 PROCEDURE — G0296 VISIT TO DETERM LDCT ELIG: CPT

## 2023-11-13 RX ORDER — TIOTROPIUM BROMIDE INHALATION SPRAY 3.12 UG/1
2.5 SPRAY, METERED RESPIRATORY (INHALATION) DAILY
Qty: 1 | Refills: 5 | Status: ACTIVE | COMMUNITY
Start: 2023-11-13 | End: 1900-01-01

## 2023-11-20 ENCOUNTER — APPOINTMENT (OUTPATIENT)
Dept: SLEEP CENTER | Facility: HOSPITAL | Age: 63
End: 2023-11-20
Payer: MEDICARE

## 2023-11-20 ENCOUNTER — OUTPATIENT (OUTPATIENT)
Dept: OUTPATIENT SERVICES | Facility: HOSPITAL | Age: 63
LOS: 1 days | End: 2023-11-20
Payer: MEDICARE

## 2023-11-20 ENCOUNTER — APPOINTMENT (OUTPATIENT)
Dept: PULMONOLOGY | Facility: HOSPITAL | Age: 63
End: 2023-11-20
Payer: MEDICARE

## 2023-11-20 ENCOUNTER — OUTPATIENT (OUTPATIENT)
Dept: OUTPATIENT SERVICES | Facility: HOSPITAL | Age: 63
LOS: 1 days | Discharge: HOME | End: 2023-11-20
Payer: MEDICARE

## 2023-11-20 DIAGNOSIS — R06.02 SHORTNESS OF BREATH: ICD-10-CM

## 2023-11-20 DIAGNOSIS — Z87.828 PERSONAL HISTORY OF OTHER (HEALED) PHYSICAL INJURY AND TRAUMA: Chronic | ICD-10-CM

## 2023-11-20 DIAGNOSIS — Z90.49 ACQUIRED ABSENCE OF OTHER SPECIFIED PARTS OF DIGESTIVE TRACT: Chronic | ICD-10-CM

## 2023-11-20 DIAGNOSIS — G47.33 OBSTRUCTIVE SLEEP APNEA (ADULT) (PEDIATRIC): ICD-10-CM

## 2023-11-20 PROCEDURE — 94727 GAS DIL/WSHOT DETER LNG VOL: CPT | Mod: 26

## 2023-11-20 PROCEDURE — 95800 SLP STDY UNATTENDED: CPT | Mod: 26

## 2023-11-20 PROCEDURE — 94727 GAS DIL/WSHOT DETER LNG VOL: CPT

## 2023-11-20 PROCEDURE — 94664 DEMO&/EVAL PT USE INHALER: CPT

## 2023-11-20 PROCEDURE — 94070 EVALUATION OF WHEEZING: CPT

## 2023-11-20 PROCEDURE — 94060 EVALUATION OF WHEEZING: CPT | Mod: 26

## 2023-11-20 PROCEDURE — 95806 SLEEP STUDY UNATT&RESP EFFT: CPT

## 2023-11-20 PROCEDURE — 94729 DIFFUSING CAPACITY: CPT

## 2023-11-20 PROCEDURE — 94729 DIFFUSING CAPACITY: CPT | Mod: 26

## 2023-11-21 DIAGNOSIS — R06.02 SHORTNESS OF BREATH: ICD-10-CM

## 2023-11-22 DIAGNOSIS — G47.33 OBSTRUCTIVE SLEEP APNEA (ADULT) (PEDIATRIC): ICD-10-CM

## 2023-12-01 ENCOUNTER — NON-APPOINTMENT (OUTPATIENT)
Age: 63
End: 2023-12-01

## 2023-12-15 NOTE — ASSESSMENT
December 15, 2023    Orlando Brooke MD  [address]  [facility] [place] [address]    Patient: Verona Villarreal   YOB: 1968   Date of Visit: 12/15/2023       Dear Orlando Brooke Md  [address]  [facility] [place] [address]    The attached plan of care is being sent to you because your patient’s medical reimbursement requires that you certify the plan of care. Your signature is required to allow uninterrupted insurance coverage.      You may indicate your approval by signing below and faxing this form back to us at No information on file..    Please call No information on file. with any questions or concerns.    Thank you for this referral,        Aileen Welsh PT  [facility] PHYSICIAN RUPINDER  [facility] PHYSICIAN RUPINDER  [address]  [facility] [address]  [phone]    Payer: Payor: ANTHEM / Plan: ANTHEM P / Product Type: *No Product type* /                                                                         Date:     Dear Aileen Welsh PT,     Re: Ms. Verona Villarreal, MRN:[id number]    I certify that I have reviewed the attached plan of care and it is medically necessary for Ms. Verona Villarreal (1968) who is under my care.          ______________________________________                    _________________  Provider name and credentials                                           Date and time                                                                                           Plan of Care 12/15/23   Effective from: 12/15/2023  Effective to: 3/15/2024    Plan ID: [id number]            Participants as of Finalize on 12/15/2023    Name Type Comments Contact Info    Orlando Brooke MD PCP - General PT eval and -[phone]    Aileen Welsh PT Physical Therapist  [phone]       Last Plan Note     Author: Aileen Welsh PT Status: Sign when Signing Visit Last edited: 12/15/2023 12:45 PM       Physical Therapy Evaluation and Treatment     Patient Name: Veroan Villarreal  MRN: [id number]  PT  [FreeTextEntry1] : 61-year-old female recent smoker with past medical history of CAD status post PCI on 2/9/2022 here for first evaluation after PCI.\par \par CAD s/p PCI\par -The patient has a residual RCA disease\par -The patient is scheduled for PCI of RCA on Thursday, 3/24/2022 at [facility]\par -Covid test as per protocol\par -Continue with aspirin 81 mg daily continue with clopidogrel 75 mg daily\par -Continue with atorvastatin 40 mg daily\par -Uptitrate metoprolol succinate to 25 mg daily\par \par f/u for cardiac cath on 3/24\par f/u in office on 4/7 Received On: 12/15/23  PT Evaluation Time Entry  PT Evaluation (Low) Time Entry: 20  PT Modalities Time Entry  E-Stim (Unattended) Time Entry: 10  PT Therapeutic Procedures Time Entry  Manual Therapy Time Entry: 12    Current Problem:   1. Radiculopathy, cervical region  Referral to Physical Therapy    Follow Up In Physical Therapy        Precautions:       Subjective Evaluation    History of Present Illness  Mechanism of injury: Pt is a 55 y/o female c/o neck pain with radiculopathy into RUE into her fingers (for several years). Over the last couple months it has gotten severely worse and her older HEP's are not helping. Radic pain and N/T/B, takes gabapentin but no relief. The use of computer mouse hurts RUE, turning her head hurts RUE, denies head aches. Cannot sleep 2/2 pain. This is her third round of PT. She has a home traction unit, but states laying on the floor is hurting her back.     Quality of life: excellent    Pain  Current pain ratin  At best pain ratin  At worst pain ratin  Quality: dull ache, needle-like, radiating, throbbing and burning  Relieving factors: relaxation, change in position and medications  Aggravating factors: keyboarding, lifting, movement and overhead activity  Progression: worsening    Hand dominance: right    Diagnostic Tests  X-ray: abnormal  MRI studies: abnormal    Treatments  Previous treatment: physical therapy and medication  Current treatment: medication and physical therapy  Patient Goals  Patient goals for therapy: increased strength, independence with ADLs/IADLs, return to sport/leisure activities, increased motion and decreased pain         2018 [facility] MRI:  Impression: Degenerative changes and disc disease, especially involving the  C4-C5, C5-C6 and C6-C7 level, with spinal canal and right foraminal  stenosis, similar to prior. No myelopathy.    Objective     Postural Observations  Seated posture: fair (head deviates left)  Standing posture: fair (head  deviates left)    Additional Postural Observation Details  VERY GUARDED, RIGID POSTURE, PT DOESNT TO WANT TO ROTATE HER HEAD    Active Range of Motion   Cervical/Thoracic Spine   Cervical    Flexion: 40 (DEVIATES L with flexion) degrees with pain  Extension: 15 (L lateral flexion compensation) degrees with pain  Left lateral flexion: 34 degrees   Right lateral flexion: 5 degrees with pain  Left rotation: 50 degrees   Right rotation: 28 degrees with pain    Strength/Myotome Testing     Right Shoulder     Planes of Motion   Flexion: 4   Extension: 4   Abduction: 4-   Adduction: 4   External rotation at 0°: 4-   Internal rotation at 0°: 4     Right Elbow   Flexion: 4  Extension: 4-   ULTT +        Treatments:  Reviewed HEP she already has at home from previous PT       Dry needling following informed consent: with e-stim; 3Hz, mA to tolerance, applied to R sided c-spine ps, suboccipitals and R upper trap, for 10 minutes.     Manual: STM to cervical ps, OA release, gentle traction (increased RUE radic), R SB increased radic, L sided down glides caused RUE radic    Assessment & Plan     Assessment  Impairments: abnormal muscle firing, activity intolerance, impaired physical strength, lacks appropriate home exercise program and pain with function  Other impairment: numbness, tingling, burning, weakness  Assessment details: Pt presents with significant postural deviations 2/2 neck pain and RUE radiculopathy. Pt very limited with positions and activities that she can complete 2/2 RUE radic symptoms. Pt also experiencing weakness in RUE especially with maintaining RUE in a certain overhead position (doing hair). Pt requires skilled therapy to address her functional deficits, weakness, N/T/B,   Barriers to therapy: none  Prognosis: good    Plan  Therapy options: will be seen for skilled physical therapy services  Planned modality interventions: electrical stimulation/Russian stimulation, TENS, microcurrent electrical  stimulation, high voltage pulsed current (pain management), traction and ultrasound  Planned therapy interventions: functional ROM exercises, home exercise program, IADL retraining, strengthening, spinal/joint mobilization, postural training, neuromuscular re-education, motor coordination training, manual therapy, ADL retraining, body mechanics training, joint mobilization and stretching  Frequency: 2x week  Duration in visits: 16  Duration in weeks: 12  Treatment plan discussed with: patient       Low complexity due to patient's clinical presentation being stable and uncomplicated by any significant comorbidities that may affect rehab tolerance and progression.     Post-Treatment Pain:  Response to Interventions: same    Goals:   Active       PT Problem       neck       Start:  12/15/23    Expected End:  03/15/24       1) Pt will report pain levels no greater than 2/10 at worst with activity for less difficulty turning head, looking up, and driving.  2) Pt will display pain-free cervical spine AROM WFL for less difficulty turning head, looking up, and driving.  3) Pt will score <10% impairment on NDI to indicate significant improvement in neck function.  4) Pt will deny any radiating pain or N&T into BUE.  5) Pt will improve postural awareness and understand proper ergonomics enabling patient to make timely self-corrections and avoid postural strain that develops into myofascial restrictions.                 Current Participants as of 12/15/2023    Name Type Comments Contact Info    Orlando Brooke MD PCP - General PT eval and [phone]    Signature pending    Aileen Welsh PT Physical Therapist  [phone]    Signature pending

## 2024-01-08 ENCOUNTER — OUTPATIENT (OUTPATIENT)
Dept: OUTPATIENT SERVICES | Facility: HOSPITAL | Age: 64
LOS: 1 days | End: 2024-01-08
Payer: MEDICARE

## 2024-01-08 ENCOUNTER — RESULT REVIEW (OUTPATIENT)
Age: 64
End: 2024-01-08

## 2024-01-08 DIAGNOSIS — Z12.2 ENCOUNTER FOR SCREENING FOR MALIGNANT NEOPLASM OF RESPIRATORY ORGANS: ICD-10-CM

## 2024-01-08 DIAGNOSIS — Z87.828 PERSONAL HISTORY OF OTHER (HEALED) PHYSICAL INJURY AND TRAUMA: Chronic | ICD-10-CM

## 2024-01-08 DIAGNOSIS — Z00.8 ENCOUNTER FOR OTHER GENERAL EXAMINATION: ICD-10-CM

## 2024-01-08 DIAGNOSIS — Z90.49 ACQUIRED ABSENCE OF OTHER SPECIFIED PARTS OF DIGESTIVE TRACT: Chronic | ICD-10-CM

## 2024-01-08 PROCEDURE — 71271 CT THORAX LUNG CANCER SCR C-: CPT | Mod: 26

## 2024-01-08 PROCEDURE — 71271 CT THORAX LUNG CANCER SCR C-: CPT

## 2024-01-09 DIAGNOSIS — Z12.2 ENCOUNTER FOR SCREENING FOR MALIGNANT NEOPLASM OF RESPIRATORY ORGANS: ICD-10-CM

## 2024-02-06 ENCOUNTER — APPOINTMENT (OUTPATIENT)
Dept: PULMONOLOGY | Facility: CLINIC | Age: 64
End: 2024-02-06

## 2024-08-16 ENCOUNTER — APPOINTMENT (OUTPATIENT)
Dept: ORTHOPEDIC SURGERY | Facility: CLINIC | Age: 64
End: 2024-08-16
Payer: MEDICARE

## 2024-08-16 DIAGNOSIS — M17.0 BILATERAL PRIMARY OSTEOARTHRITIS OF KNEE: ICD-10-CM

## 2024-08-16 PROCEDURE — 73560 X-RAY EXAM OF KNEE 1 OR 2: CPT | Mod: 50

## 2024-08-16 NOTE — HISTORY OF PRESENT ILLNESS
[de-identified] : Right knee pain 63F  b/l knee pain worse on right want to have a tka has failed inj weight is elevated understands she needs to lose weight prior to surgery to mitigate risk Mechanical pain made worse with activity, not completely improved with rest, interfering with ADLs. At this point nonoperative management of the symptoms is ineffective and the patient has interest in moving forward with a joint replacement.   Past medical history is on the chart for review and as mentioned above.   ROS is negative for fever, chills, CP, SOB, unexplained weight loss or gain.       RIGHT KNEE EXAM Knee is painful with PROM and limited in flexion and extension by guarding. Crepitation with ROM +JLT, TTP over the femoral condyles There is significant guarding throughout the exam limiting ROM testing. AROM demonstrated is 2-100 NVID No gross instability noted but exam limited by pain/guarding.     Imaging: X-rays AP and Lateral weight bearing views of the knees shows advanced degenerative changes including loss of the joint space with bone to bone apposition, osteophytes, subchondral sclerosis and cystic changes of the right knee.     Assessment: Severe right knee arthritis.     Plan is for a right total knee replacement. The surgery, preoperative workup, surgical procedure, hospital course, post operative course, rehab, and expected outcomes were discussed. A description of the surgery in layman's terms, using models equivalent to the implants used during surgery, was a part of this conversation. The tibia and femur are resurfaced, ligament balance is restored, and the patella is addressed on a case by case basis, either resurfaced or preserved.   The patient will require a rolling walker for 2-4 weeks postoperativley due to gait instability to help with mobility related ADL's and a commode as they confined to a one level without access to a bathroom.  Patient is able to safely use the walker and functional mobility deficit can be sufficiently resolved with use of a walker.   The risks and benefits of the surgery were discussed.  Benefits  were described as improvement in pain and function.  Risks including bleeding, infection, blood clots, DVT, PE, stroke, heart attack, fracture, loss of motion, arthrofibrosis, implant loosening, instability, nerve palsy, neurovascular injury, persistent pain, RSD, and in rare cases death. Should the implant not function as expected or wear out then revision surgery may be required in the future.   We will proceed with scheduling the procedure.

## 2024-12-09 ENCOUNTER — INPATIENT (INPATIENT)
Facility: HOSPITAL | Age: 64
LOS: 11 days | Discharge: HOME | DRG: 282 | End: 2024-12-21
Attending: INTERNAL MEDICINE | Admitting: INTERNAL MEDICINE
Payer: MEDICARE

## 2024-12-09 VITALS
HEART RATE: 82 BPM | WEIGHT: 250.45 LBS | RESPIRATION RATE: 18 BRPM | TEMPERATURE: 98 F | SYSTOLIC BLOOD PRESSURE: 114 MMHG | OXYGEN SATURATION: 100 % | DIASTOLIC BLOOD PRESSURE: 69 MMHG

## 2024-12-09 DIAGNOSIS — Z87.828 PERSONAL HISTORY OF OTHER (HEALED) PHYSICAL INJURY AND TRAUMA: Chronic | ICD-10-CM

## 2024-12-09 DIAGNOSIS — I21.4 NON-ST ELEVATION (NSTEMI) MYOCARDIAL INFARCTION: ICD-10-CM

## 2024-12-09 DIAGNOSIS — Z90.49 ACQUIRED ABSENCE OF OTHER SPECIFIED PARTS OF DIGESTIVE TRACT: Chronic | ICD-10-CM

## 2024-12-09 LAB
ALBUMIN SERPL ELPH-MCNC: 3.6 G/DL — SIGNIFICANT CHANGE UP (ref 3.5–5.2)
ALP SERPL-CCNC: 95 U/L — SIGNIFICANT CHANGE UP (ref 30–115)
ALT FLD-CCNC: 15 U/L — SIGNIFICANT CHANGE UP (ref 0–41)
ANION GAP SERPL CALC-SCNC: 8 MMOL/L — SIGNIFICANT CHANGE UP (ref 7–14)
AST SERPL-CCNC: 15 U/L — SIGNIFICANT CHANGE UP (ref 0–41)
BASE EXCESS BLDV CALC-SCNC: 14.4 MMOL/L — HIGH (ref -2–3)
BASOPHILS # BLD AUTO: 0.03 K/UL — SIGNIFICANT CHANGE UP (ref 0–0.2)
BASOPHILS NFR BLD AUTO: 0.2 % — SIGNIFICANT CHANGE UP (ref 0–1)
BILIRUB SERPL-MCNC: 0.2 MG/DL — SIGNIFICANT CHANGE UP (ref 0.2–1.2)
BUN SERPL-MCNC: 20 MG/DL — SIGNIFICANT CHANGE UP (ref 10–20)
CA-I SERPL-SCNC: 1.23 MMOL/L — SIGNIFICANT CHANGE UP (ref 1.15–1.33)
CALCIUM SERPL-MCNC: 9.1 MG/DL — SIGNIFICANT CHANGE UP (ref 8.4–10.5)
CHLORIDE SERPL-SCNC: 95 MMOL/L — LOW (ref 98–110)
CO2 SERPL-SCNC: 39 MMOL/L — HIGH (ref 17–32)
CREAT SERPL-MCNC: 0.5 MG/DL — LOW (ref 0.7–1.5)
EGFR: 103 ML/MIN/1.73M2 — SIGNIFICANT CHANGE UP
EOSINOPHIL # BLD AUTO: 0.09 K/UL — SIGNIFICANT CHANGE UP (ref 0–0.7)
EOSINOPHIL NFR BLD AUTO: 0.7 % — SIGNIFICANT CHANGE UP (ref 0–8)
GAS PNL BLDV: 136 MMOL/L — SIGNIFICANT CHANGE UP (ref 136–145)
GAS PNL BLDV: SIGNIFICANT CHANGE UP
GLUCOSE SERPL-MCNC: 156 MG/DL — HIGH (ref 70–99)
HCO3 BLDV-SCNC: 45 MMOL/L — CRITICAL HIGH (ref 22–29)
HCT VFR BLD CALC: 35.9 % — LOW (ref 37–47)
HCT VFR BLDA CALC: 32 % — LOW (ref 34.5–46.5)
HGB BLD CALC-MCNC: 10.8 G/DL — LOW (ref 11.7–16.1)
HGB BLD-MCNC: 10.6 G/DL — LOW (ref 12–16)
IMM GRANULOCYTES NFR BLD AUTO: 0.7 % — HIGH (ref 0.1–0.3)
LACTATE BLDV-MCNC: 1.1 MMOL/L — SIGNIFICANT CHANGE UP (ref 0.5–2)
LYMPHOCYTES # BLD AUTO: 1.11 K/UL — LOW (ref 1.2–3.4)
LYMPHOCYTES # BLD AUTO: 9 % — LOW (ref 20.5–51.1)
MAGNESIUM SERPL-MCNC: 2.4 MG/DL — SIGNIFICANT CHANGE UP (ref 1.8–2.4)
MCHC RBC-ENTMCNC: 27.2 PG — SIGNIFICANT CHANGE UP (ref 27–31)
MCHC RBC-ENTMCNC: 29.5 G/DL — LOW (ref 32–37)
MCV RBC AUTO: 92.3 FL — SIGNIFICANT CHANGE UP (ref 81–99)
MONOCYTES # BLD AUTO: 1.03 K/UL — HIGH (ref 0.1–0.6)
MONOCYTES NFR BLD AUTO: 8.4 % — SIGNIFICANT CHANGE UP (ref 1.7–9.3)
NEUTROPHILS # BLD AUTO: 9.94 K/UL — HIGH (ref 1.4–6.5)
NEUTROPHILS NFR BLD AUTO: 81 % — HIGH (ref 42.2–75.2)
NRBC # BLD: 0 /100 WBCS — SIGNIFICANT CHANGE UP (ref 0–0)
NT-PROBNP SERPL-SCNC: 1402 PG/ML — HIGH (ref 0–300)
PCO2 BLDV: 95 MMHG — CRITICAL HIGH (ref 39–42)
PH BLDV: 7.28 — LOW (ref 7.32–7.43)
PLATELET # BLD AUTO: 359 K/UL — SIGNIFICANT CHANGE UP (ref 130–400)
PMV BLD: 9.3 FL — SIGNIFICANT CHANGE UP (ref 7.4–10.4)
PO2 BLDV: 20 MMHG — LOW (ref 25–45)
POTASSIUM BLDV-SCNC: 4.2 MMOL/L — SIGNIFICANT CHANGE UP (ref 3.5–5.1)
POTASSIUM SERPL-MCNC: 4.4 MMOL/L — SIGNIFICANT CHANGE UP (ref 3.5–5)
POTASSIUM SERPL-SCNC: 4.4 MMOL/L — SIGNIFICANT CHANGE UP (ref 3.5–5)
PROT SERPL-MCNC: 6.4 G/DL — SIGNIFICANT CHANGE UP (ref 6–8)
RBC # BLD: 3.89 M/UL — LOW (ref 4.2–5.4)
RBC # FLD: 13.8 % — SIGNIFICANT CHANGE UP (ref 11.5–14.5)
SAO2 % BLDV: 31.3 % — LOW (ref 67–88)
SODIUM SERPL-SCNC: 142 MMOL/L — SIGNIFICANT CHANGE UP (ref 135–146)
TROPONIN T, HIGH SENSITIVITY RESULT: 154 NG/L — CRITICAL HIGH (ref 6–13)
TROPONIN T, HIGH SENSITIVITY RESULT: 157 NG/L — CRITICAL HIGH (ref 6–13)
WBC # BLD: 12.29 K/UL — HIGH (ref 4.8–10.8)
WBC # FLD AUTO: 12.29 K/UL — HIGH (ref 4.8–10.8)

## 2024-12-09 PROCEDURE — 85027 COMPLETE CBC AUTOMATED: CPT

## 2024-12-09 PROCEDURE — 0225U NFCT DS DNA&RNA 21 SARSCOV2: CPT

## 2024-12-09 PROCEDURE — 36415 COLL VENOUS BLD VENIPUNCTURE: CPT

## 2024-12-09 PROCEDURE — C1874: CPT

## 2024-12-09 PROCEDURE — 83605 ASSAY OF LACTIC ACID: CPT

## 2024-12-09 PROCEDURE — 99291 CRITICAL CARE FIRST HOUR: CPT | Mod: FS

## 2024-12-09 PROCEDURE — 85730 THROMBOPLASTIN TIME PARTIAL: CPT

## 2024-12-09 PROCEDURE — 83735 ASSAY OF MAGNESIUM: CPT

## 2024-12-09 PROCEDURE — 85610 PROTHROMBIN TIME: CPT

## 2024-12-09 PROCEDURE — 94660 CPAP INITIATION&MGMT: CPT

## 2024-12-09 PROCEDURE — 93005 ELECTROCARDIOGRAM TRACING: CPT

## 2024-12-09 PROCEDURE — 82803 BLOOD GASES ANY COMBINATION: CPT

## 2024-12-09 PROCEDURE — 93306 TTE W/DOPPLER COMPLETE: CPT

## 2024-12-09 PROCEDURE — 94640 AIRWAY INHALATION TREATMENT: CPT

## 2024-12-09 PROCEDURE — 93458 L HRT ARTERY/VENTRICLE ANGIO: CPT | Mod: 59

## 2024-12-09 PROCEDURE — 84145 PROCALCITONIN (PCT): CPT

## 2024-12-09 PROCEDURE — C1894: CPT

## 2024-12-09 PROCEDURE — 84100 ASSAY OF PHOSPHORUS: CPT

## 2024-12-09 PROCEDURE — C1725: CPT

## 2024-12-09 PROCEDURE — 85025 COMPLETE CBC W/AUTO DIFF WBC: CPT

## 2024-12-09 PROCEDURE — C1887: CPT

## 2024-12-09 PROCEDURE — 92978 ENDOLUMINL IVUS OCT C 1ST: CPT | Mod: LC

## 2024-12-09 PROCEDURE — 71045 X-RAY EXAM CHEST 1 VIEW: CPT | Mod: 26

## 2024-12-09 PROCEDURE — 80048 BASIC METABOLIC PNL TOTAL CA: CPT

## 2024-12-09 PROCEDURE — C1769: CPT

## 2024-12-09 PROCEDURE — ZZZZZ: CPT

## 2024-12-09 PROCEDURE — 80053 COMPREHEN METABOLIC PANEL: CPT

## 2024-12-09 PROCEDURE — 71045 X-RAY EXAM CHEST 1 VIEW: CPT

## 2024-12-09 PROCEDURE — 84484 ASSAY OF TROPONIN QUANT: CPT

## 2024-12-09 PROCEDURE — C9600: CPT | Mod: LC

## 2024-12-09 PROCEDURE — 93010 ELECTROCARDIOGRAM REPORT: CPT

## 2024-12-09 PROCEDURE — C1753: CPT

## 2024-12-09 RX ORDER — METHYLPREDNISOLONE SOD SUCC 125 MG
40 VIAL (EA) INJECTION EVERY 8 HOURS
Refills: 0 | Status: DISCONTINUED | OUTPATIENT
Start: 2024-12-09 | End: 2024-12-10

## 2024-12-09 RX ORDER — ENOXAPARIN SODIUM 30 MG/.3ML
115 INJECTION SUBCUTANEOUS ONCE
Refills: 0 | Status: COMPLETED | OUTPATIENT
Start: 2024-12-09 | End: 2024-12-09

## 2024-12-09 RX ORDER — METHYLPREDNISOLONE SOD SUCC 125 MG
125 VIAL (EA) INJECTION ONCE
Refills: 0 | Status: COMPLETED | OUTPATIENT
Start: 2024-12-09 | End: 2024-12-09

## 2024-12-09 RX ORDER — IPRATROPIUM BROMIDE AND ALBUTEROL SULFATE 2.5; .5 MG/3ML; MG/3ML
3 SOLUTION RESPIRATORY (INHALATION) EVERY 6 HOURS
Refills: 0 | Status: DISCONTINUED | OUTPATIENT
Start: 2024-12-09 | End: 2024-12-21

## 2024-12-09 RX ORDER — ACETAMINOPHEN 500MG 500 MG/1
650 TABLET, COATED ORAL EVERY 6 HOURS
Refills: 0 | Status: DISCONTINUED | OUTPATIENT
Start: 2024-12-09 | End: 2024-12-21

## 2024-12-09 RX ORDER — IPRATROPIUM BROMIDE AND ALBUTEROL SULFATE 2.5; .5 MG/3ML; MG/3ML
3 SOLUTION RESPIRATORY (INHALATION)
Refills: 0 | Status: COMPLETED | OUTPATIENT
Start: 2024-12-09 | End: 2024-12-09

## 2024-12-09 RX ORDER — ONDANSETRON HYDROCHLORIDE 4 MG/1
4 TABLET, FILM COATED ORAL EVERY 8 HOURS
Refills: 0 | Status: DISCONTINUED | OUTPATIENT
Start: 2024-12-09 | End: 2024-12-21

## 2024-12-09 RX ORDER — ACETAMINOPHEN, DIPHENHYDRAMINE HCL, PHENYLEPHRINE HCL 325; 25; 5 MG/1; MG/1; MG/1
3 TABLET ORAL AT BEDTIME
Refills: 0 | Status: DISCONTINUED | OUTPATIENT
Start: 2024-12-09 | End: 2024-12-10

## 2024-12-09 RX ORDER — MAGNESIUM, ALUMINUM HYDROXIDE 200-225/5
30 SUSPENSION, ORAL (FINAL DOSE FORM) ORAL EVERY 4 HOURS
Refills: 0 | Status: DISCONTINUED | OUTPATIENT
Start: 2024-12-09 | End: 2024-12-21

## 2024-12-09 RX ADMIN — IPRATROPIUM BROMIDE AND ALBUTEROL SULFATE 3 MILLILITER(S): 2.5; .5 SOLUTION RESPIRATORY (INHALATION) at 22:10

## 2024-12-09 RX ADMIN — Medication 125 MILLIGRAM(S): at 20:44

## 2024-12-09 RX ADMIN — ENOXAPARIN SODIUM 115 MILLIGRAM(S): 30 INJECTION SUBCUTANEOUS at 22:00

## 2024-12-09 RX ADMIN — IPRATROPIUM BROMIDE AND ALBUTEROL SULFATE 3 MILLILITER(S): 2.5; .5 SOLUTION RESPIRATORY (INHALATION) at 20:45

## 2024-12-09 RX ADMIN — Medication 324 MILLIGRAM(S): at 22:17

## 2024-12-09 RX ADMIN — IPRATROPIUM BROMIDE AND ALBUTEROL SULFATE 3 MILLILITER(S): 2.5; .5 SOLUTION RESPIRATORY (INHALATION) at 21:30

## 2024-12-09 NOTE — ED PROVIDER NOTE - OBJECTIVE STATEMENT
64-year-old female with past medical history of HLD, CAD s/p stents on Plavix, GERD, and COPD on 5L home O2 presents to the ED for evaluation of worsening shortness of breath x 2 weeks.  Patient states she thinks she caught a cold from family during Thanksgiving and since then has had persistent productive cough with yellow sputum and worsening shortness of breath.  Patient has been using her nebulizer more frequently and her rescue inhaler as well without much improvement.  Pt denies fever, chills, nausea, vomiting, abdominal pain, diarrhea, headache, dizziness, weakness, chest pain, back pain, LOC, trauma, urinary symptoms, calf pain, recent travel, recent surgery.

## 2024-12-09 NOTE — ED PROVIDER NOTE - CARE PLAN
Principal Discharge DX:	NSTEMI (non-ST elevated myocardial infarction)  Secondary Diagnosis:	COPD exacerbation  Secondary Diagnosis:	Acute respiratory failure with hypercapnia   1

## 2024-12-09 NOTE — H&P ADULT - ASSESSMENT
63 yo female PMHx of COPD (on home O2 5L) CAD s/p stents (on Plavix)  HTN presents to ER c/o shortness of breath x 1 week.     # NSTEMI   # h/o CAD s/p stents   - admit to telemetry   - cardiac monitoring   - trend troponin   - echo   - cardiology consult   - pt anticoagulated in ER  - c/w Plavix      # hypercapnic acidosis respiratory failure   # COPD exacerbation   # URI/cough   - supplemental O2, wean Bipap  - c/w solumedrol   - c/w inhalers   - nebs prn   - IV Rocephin   - pulmonary consult     # dvt ppx   # gi ppx          63 yo female PMHx of COPD (on home O2 5L) CAD s/p stents (on Plavix)  HTN presents to ER c/o shortness of breath x 1 week.     # NSTEMI   # h/o CAD s/p stents   - admit to telemetry   - cardiac monitoring   - trend troponin   - echo   - cardiology consult   - pt anticoagulated in ER  - c/w Plavix      # hypercapnic acidosis respiratory failure   # COPD exacerbation   # URI/cough   - supplemental O2, wean Bipap  - c/w solumedrol   - c/w inhalers   - nebs prn   - IV Rocephin   - pulmonary consult     # h/o HTN  c/w HCTZ    # h/o HLD  c/w statin     # dvt ppx   # gi ppx   # full code

## 2024-12-09 NOTE — ED PROVIDER NOTE - PHYSICAL EXAMINATION
VITAL SIGNS: I have reviewed nursing notes and confirm.  CONSTITUTIONAL: 65 yo F laying on stretcher; in no acute distress.  SKIN: Skin exam is warm and dry, no acute rash.  HEAD: Normocephalic; atraumatic.  EYES: PERRL, EOM intact; conjunctiva and sclera clear.  ENT: No nasal discharge; airway clear.   CARD: S1, S2 normal; no murmurs, gallops, or rubs. Regular rate and rhythm.  RESP: No rales or rhonchi. Speaking in full sentences. (+) diffuse wheezing  ABD: Normal bowel sounds; soft; non-distended; non-tender; No rebound or guarding. No CVA tenderness.  EXT: Normal ROM. No clubbing, cyanosis. (+) mild B/L LE edema.   NEURO: Alert, oriented. Grossly unremarkable. No focal deficits.

## 2024-12-09 NOTE — H&P ADULT - NSHPPHYSICALEXAM_GEN_ALL_CORE
ICU Vital Signs Last 24 Hrs  T(C): 36.6 (09 Dec 2024 19:42), Max: 36.6 (09 Dec 2024 19:42)  T(F): 97.9 (09 Dec 2024 19:42), Max: 97.9 (09 Dec 2024 19:42)  HR: 70 (09 Dec 2024 20:51) (70 - 82)  BP: 114/69 (09 Dec 2024 19:42) (114/69 - 114/69)  BP(mean): --  ABP: --  ABP(mean): --  RR: 18 (09 Dec 2024 19:42) (18 - 18)  SpO2: 98% (09 Dec 2024 20:51) (98% - 100%)    Constitutional: NAD on bipap   HEENT: Airway patent, moist MM, no erythema/swelling/deformity of oral structures. EOMI, PERRLA.  CV: regular rate, regular rhythm, well-perfused extremities, 2+ b/l DP and radial pulses equal.  Lungs: + wheeze,  no rales, rhonchi   ABD: NTND, no guarding or rebound, no pulsatile mass, no hernias.   MSK: Neck supple, nontender, nl ROM, no stepoff. Chest nontender. Back nontender in TLS spine or to b/l bony structures or flanks. Ext nontender, nl rom, no deformity.   INTEG: Skin warm, dry, no rash.  NEURO: A&Ox3, normal strength, nl sensation throughout, normal speech.   PSYCH: Denies SI/HI/hallucinations      O2 Parameters below as of 09 Dec 2024 20:51  Patient On (Oxygen Delivery Method): BiPAP/CPAP    O2 Concentration (%): 45    Constitutional: NAD, well-nourished, non toxic appearing   HEENT: Airway patent, moist MM, no erythema/swelling/deformity of oral structures. EOMI, PERRLA.  CV: regular rate, regular rhythm, well-perfused extremities, 2+ b/l DP and radial pulses equal.  Lungs: BCTA, no increased WOB.  ABD: NTND, no guarding or rebound, no pulsatile mass, no hernias.   MSK: Neck supple, nontender, nl ROM, no stepoff. Chest nontender. Back nontender in TLS spine or to b/l bony structures or flanks. Ext nontender, nl rom, no deformity.   INTEG: Skin warm, dry, no rash.  NEURO: A&Ox3, normal strength, nl sensation throughout, normal speech.   PSYCH: Denies SI/HI/hallucinations

## 2024-12-09 NOTE — H&P ADULT - NSHPLABSRESULTS_GEN_ALL_CORE
10.6   12.29 )-----------( 359      ( 09 Dec 2024 20:35 )             35.9   Urinalysis Basic - ( 09 Dec 2024 20:35 )    Color: x / Appearance: x / SG: x / pH: x  Gluc: 156 mg/dL / Ketone: x  / Bili: x / Urobili: x   Blood: x / Protein: x / Nitrite: x   Leuk Esterase: x / RBC: x / WBC x   Sq Epi: x / Non Sq Epi: x / Bacteria: x    12-09    142  |  95[L]  |  20  ----------------------------<  156[H]  4.4   |  39[H]  |  0.5[L]    Ca    9.1      09 Dec 2024 20:35  Mg     2.4     12-09    TPro  6.4  /  Alb  3.6  /  TBili  0.2  /  DBili  x   /  AST  15  /  ALT  15  /  AlkPhos  95  12-09      Troponin T, High Sensitivity Result: 154: Critical value:  A single cardiac troponin result is not definitive in diagnosing  myocardial infarction. Serial measurements are required in suspected  NSTEMI. Elevations are notable in patients with renal impairment,  rhabdomyolysis and polymyositis. Hemolysis may cause falsely low results.  Elevated troponin that is decreasing in serial measurements may be  indicative of resolving ischemia. ng/L (12.09.24 @ 22:48)

## 2024-12-09 NOTE — H&P ADULT - HISTORY OF PRESENT ILLNESS
63 yo female PMHx of COPD (on home O2 5L) CAD s/p stents (on Plavix)  HTN presents to ER c/o shortness of breath x 1 week. pt states she had URI symptoms after Thanksgiving, she developed a productive cough with yellow sputum and worsening shortness of breath. Pt states today she had dyspnea only walking a few steps and was unable to catch her breath, no relief from rescue inhaler. In ER pt was placed on bipap, found to be acidotic and hypercapnic, also had elevated troponin, no EKG changes. ICU consulted and recommend admission to telemetry. Denies fever, chills, n/v/d, abdominal pain, dysuria.

## 2024-12-09 NOTE — H&P ADULT - NSICDXPASTMEDICALHX_GEN_ALL_CORE_FT
PAST MEDICAL HISTORY:  CAD (coronary artery disease)     Chronic obstructive pulmonary disease (COPD)     HTN (hypertension)     Hyperlipidemia

## 2024-12-09 NOTE — ED PROVIDER NOTE - CLINICAL SUMMARY MEDICAL DECISION MAKING FREE TEXT BOX
64 yr pmhx CAD, COPD on home O2 4 L SHANKAR presents with 1 week of cough shortness patient well-appearing decreased breath sounds with wheezing no tachypnea no respiratory distress respiratory acidosis pH 7.2 with a CO2 95..  EKG unchanged from previous troponin 150 patient denies any chest pain discussed with patient no bleeding or change in stool color.  Aspirin and Lovenox given for NSTEMI.  Bedside sono no large pericardial effusion/tamponade.  Intensivist consulted.  Patient to be admitted to telemetry.

## 2024-12-09 NOTE — ED ADULT NURSE NOTE - IN THE PAST 12 MONTHS HAVE YOU USED DRUGS OTHER THAN THOSE REQUIRED FOR MEDICAL REASON?
You may receive a survey regarding the care you received during your visit. Your input is valuable to us. We encourage you to complete and return your survey. We hope you will choose us in the future for your healthcare needs. Neurontin restart at 300 mg BID x 3 days, then 300 mg QID x 2 weeks then 600 mg QID.
No
no

## 2024-12-09 NOTE — ED ADULT NURSE NOTE - NSFALLHARMRISKINTERV_ED_ALL_ED

## 2024-12-10 ENCOUNTER — RESULT REVIEW (OUTPATIENT)
Age: 64
End: 2024-12-10

## 2024-12-10 LAB
ANION GAP SERPL CALC-SCNC: 9 MMOL/L — SIGNIFICANT CHANGE UP (ref 7–14)
BASOPHILS # BLD AUTO: 0.02 K/UL — SIGNIFICANT CHANGE UP (ref 0–0.2)
BASOPHILS NFR BLD AUTO: 0.2 % — SIGNIFICANT CHANGE UP (ref 0–1)
BUN SERPL-MCNC: 19 MG/DL — SIGNIFICANT CHANGE UP (ref 10–20)
CALCIUM SERPL-MCNC: 9.7 MG/DL — SIGNIFICANT CHANGE UP (ref 8.4–10.5)
CHLORIDE SERPL-SCNC: 93 MMOL/L — LOW (ref 98–110)
CO2 SERPL-SCNC: 38 MMOL/L — HIGH (ref 17–32)
CREAT SERPL-MCNC: 0.6 MG/DL — LOW (ref 0.7–1.5)
EGFR: 99 ML/MIN/1.73M2 — SIGNIFICANT CHANGE UP
EOSINOPHIL # BLD AUTO: 0 K/UL — SIGNIFICANT CHANGE UP (ref 0–0.7)
EOSINOPHIL NFR BLD AUTO: 0 % — SIGNIFICANT CHANGE UP (ref 0–8)
GLUCOSE SERPL-MCNC: 186 MG/DL — HIGH (ref 70–99)
HCT VFR BLD CALC: 37.2 % — SIGNIFICANT CHANGE UP (ref 37–47)
HGB BLD-MCNC: 10.9 G/DL — LOW (ref 12–16)
IMM GRANULOCYTES NFR BLD AUTO: 0.6 % — HIGH (ref 0.1–0.3)
LYMPHOCYTES # BLD AUTO: 0.83 K/UL — LOW (ref 1.2–3.4)
LYMPHOCYTES # BLD AUTO: 6.6 % — LOW (ref 20.5–51.1)
MAGNESIUM SERPL-MCNC: 2.4 MG/DL — SIGNIFICANT CHANGE UP (ref 1.8–2.4)
MCHC RBC-ENTMCNC: 26.5 PG — LOW (ref 27–31)
MCHC RBC-ENTMCNC: 29.3 G/DL — LOW (ref 32–37)
MCV RBC AUTO: 90.3 FL — SIGNIFICANT CHANGE UP (ref 81–99)
MONOCYTES # BLD AUTO: 0.08 K/UL — LOW (ref 0.1–0.6)
MONOCYTES NFR BLD AUTO: 0.6 % — LOW (ref 1.7–9.3)
NEUTROPHILS # BLD AUTO: 11.59 K/UL — HIGH (ref 1.4–6.5)
NEUTROPHILS NFR BLD AUTO: 92 % — HIGH (ref 42.2–75.2)
NRBC # BLD: 0 /100 WBCS — SIGNIFICANT CHANGE UP (ref 0–0)
PLATELET # BLD AUTO: 390 K/UL — SIGNIFICANT CHANGE UP (ref 130–400)
PMV BLD: 9.8 FL — SIGNIFICANT CHANGE UP (ref 7.4–10.4)
POTASSIUM SERPL-MCNC: 4.7 MMOL/L — SIGNIFICANT CHANGE UP (ref 3.5–5)
POTASSIUM SERPL-SCNC: 4.7 MMOL/L — SIGNIFICANT CHANGE UP (ref 3.5–5)
PROCALCITONIN SERPL-MCNC: 0.21 NG/ML — HIGH (ref 0.02–0.1)
RBC # BLD: 4.12 M/UL — LOW (ref 4.2–5.4)
RBC # FLD: 13.3 % — SIGNIFICANT CHANGE UP (ref 11.5–14.5)
SODIUM SERPL-SCNC: 140 MMOL/L — SIGNIFICANT CHANGE UP (ref 135–146)
TROPONIN T, HIGH SENSITIVITY RESULT: 102 NG/L — CRITICAL HIGH (ref 6–13)
WBC # BLD: 12.59 K/UL — HIGH (ref 4.8–10.8)
WBC # FLD AUTO: 12.59 K/UL — HIGH (ref 4.8–10.8)

## 2024-12-10 PROCEDURE — 99223 1ST HOSP IP/OBS HIGH 75: CPT

## 2024-12-10 PROCEDURE — 93306 TTE W/DOPPLER COMPLETE: CPT | Mod: 26

## 2024-12-10 PROCEDURE — 99223 1ST HOSP IP/OBS HIGH 75: CPT | Mod: FS

## 2024-12-10 RX ORDER — ACETAMINOPHEN, DIPHENHYDRAMINE HCL, PHENYLEPHRINE HCL 325; 25; 5 MG/1; MG/1; MG/1
5 TABLET ORAL AT BEDTIME
Refills: 0 | Status: DISCONTINUED | OUTPATIENT
Start: 2024-12-10 | End: 2024-12-21

## 2024-12-10 RX ORDER — CLOPIDOGREL 75 MG/1
75 TABLET, FILM COATED ORAL DAILY
Refills: 0 | Status: DISCONTINUED | OUTPATIENT
Start: 2024-12-10 | End: 2024-12-21

## 2024-12-10 RX ORDER — FLUTICASONE PROPIONATE AND SALMETEROL XINAFOATE 45; 21 UG/1; UG/1
1 AEROSOL, METERED RESPIRATORY (INHALATION)
Refills: 0 | Status: DISCONTINUED | OUTPATIENT
Start: 2024-12-10 | End: 2024-12-12

## 2024-12-10 RX ORDER — CEFTRIAXONE SODIUM 1 G
1000 VIAL (EA) INJECTION EVERY 24 HOURS
Refills: 0 | Status: COMPLETED | OUTPATIENT
Start: 2024-12-10 | End: 2024-12-14

## 2024-12-10 RX ORDER — AZITHROMYCIN 250 MG/1
500 TABLET, FILM COATED ORAL EVERY 24 HOURS
Refills: 0 | Status: DISCONTINUED | OUTPATIENT
Start: 2024-12-10 | End: 2024-12-12

## 2024-12-10 RX ORDER — METHYLPREDNISOLONE SOD SUCC 125 MG
60 VIAL (EA) INJECTION EVERY 12 HOURS
Refills: 0 | Status: DISCONTINUED | OUTPATIENT
Start: 2024-12-10 | End: 2024-12-21

## 2024-12-10 RX ORDER — PANTOPRAZOLE SODIUM 40 MG/1
40 TABLET, DELAYED RELEASE ORAL
Refills: 0 | Status: DISCONTINUED | OUTPATIENT
Start: 2024-12-10 | End: 2024-12-21

## 2024-12-10 RX ORDER — CHLORHEXIDINE GLUCONATE 1.2 MG/ML
1 RINSE ORAL
Refills: 0 | Status: DISCONTINUED | OUTPATIENT
Start: 2024-12-10 | End: 2024-12-21

## 2024-12-10 RX ORDER — INFLUENZA VIRUS VACCINE 15; 15; 15; 15 UG/.5ML; UG/.5ML; UG/.5ML; UG/.5ML
0.5 SUSPENSION INTRAMUSCULAR ONCE
Refills: 0 | Status: DISCONTINUED | OUTPATIENT
Start: 2024-12-10 | End: 2024-12-21

## 2024-12-10 RX ADMIN — Medication 40 MILLIGRAM(S): at 22:43

## 2024-12-10 RX ADMIN — Medication 100 MILLIGRAM(S): at 05:27

## 2024-12-10 RX ADMIN — ACETAMINOPHEN 500MG 650 MILLIGRAM(S): 500 TABLET, COATED ORAL at 14:43

## 2024-12-10 RX ADMIN — IPRATROPIUM BROMIDE AND ALBUTEROL SULFATE 3 MILLILITER(S): 2.5; .5 SOLUTION RESPIRATORY (INHALATION) at 13:27

## 2024-12-10 RX ADMIN — Medication 81 MILLIGRAM(S): at 13:06

## 2024-12-10 RX ADMIN — ACETAMINOPHEN 500MG 650 MILLIGRAM(S): 500 TABLET, COATED ORAL at 00:25

## 2024-12-10 RX ADMIN — Medication 40 MILLIGRAM(S): at 05:25

## 2024-12-10 RX ADMIN — AZITHROMYCIN 255 MILLIGRAM(S): 250 TABLET, FILM COATED ORAL at 14:31

## 2024-12-10 RX ADMIN — ACETAMINOPHEN, DIPHENHYDRAMINE HCL, PHENYLEPHRINE HCL 3 MILLIGRAM(S): 325; 25; 5 TABLET ORAL at 00:25

## 2024-12-10 RX ADMIN — PANTOPRAZOLE SODIUM 40 MILLIGRAM(S): 40 TABLET, DELAYED RELEASE ORAL at 05:25

## 2024-12-10 RX ADMIN — CLOPIDOGREL 75 MILLIGRAM(S): 75 TABLET, FILM COATED ORAL at 13:06

## 2024-12-10 RX ADMIN — IPRATROPIUM BROMIDE AND ALBUTEROL SULFATE 3 MILLILITER(S): 2.5; .5 SOLUTION RESPIRATORY (INHALATION) at 19:52

## 2024-12-10 RX ADMIN — Medication 60 MILLIGRAM(S): at 17:39

## 2024-12-10 RX ADMIN — IPRATROPIUM BROMIDE AND ALBUTEROL SULFATE 3 MILLILITER(S): 2.5; .5 SOLUTION RESPIRATORY (INHALATION) at 07:38

## 2024-12-10 RX ADMIN — ACETAMINOPHEN 500MG 650 MILLIGRAM(S): 500 TABLET, COATED ORAL at 14:31

## 2024-12-10 NOTE — PATIENT PROFILE ADULT - FALL HARM RISK - HARM RISK INTERVENTIONS

## 2024-12-10 NOTE — CONSULT NOTE ADULT - ASSESSMENT
IMPRESSION/PLAN:  URI  COPD exacerbation  NSTEMI    Pt w URI productive of yellowish sputum, would treat empirically w abx.  Continue bronchodilators.  Pulmonary eval.  Pt is likely a CO2 retainer.  Would taper off bipap and resume NC.  Obtain TTE, Check serial CE, Cardio eval. Admit to telemetry.  reconsult ICU prn.    CNS: avoid sedation    HEENT:oral care    PULMONARY: continue bronchodilators, continue O2 via NC. Pulm eval    CARDIOVASCULAR: serial CE, TTE, Cardio eval    GI: GI prophylaxis.  Feeding     RENAL: monitor uo    INFECTIOUS DISEASE: rocephin    HEMATOLOGICAL:  DVT prophylaxis.    ENDOCRINE:  Follow up FS.  Insulin protocol if needed.    MUSCULOSKELETAL: oob to chair        CRITICAL CARE TIME SPENT: 30 minutes
65 yo female PMHx of COPD (on home O2 5L) CAD s/p stents (on Plavix)  HTN presents to ER c/o shortness of breath x 1 week. pt states she had URI symptoms after Thanksgiving, she developed a productive cough with yellow sputum and worsening shortness of breath.Sob similar to symptoms prior stent. She has SHANKAR . She uses dental device. Check cxr . Troponin elevated. To discuss with Dr Cohen. Possible cardiac cath
IMPRESSION:  copd exacerbation   possible PNA   acute hypercapnia resp failure   ?? NSTEMI     PLAN:  please do full RVP   repeat cxr luis enrique   possible need ct scan   add azithromycin to Rocephine   solumedrol 60 mg Q12 hrs   start symbicort Q12 hrs   add spiriva upon d/c   NIV at night if patient agree   keep pox >92%   cardiology follow up for elevated trop   echo

## 2024-12-10 NOTE — CHART NOTE - NSCHARTNOTEFT_GEN_A_CORE
Pulmonary Recommendations as per Dr Juarez  - All ordered as suggested  - agrees to BIPAP Pulmonary Recommendations as per Dr Juarez  - All ordered as suggested  - agrees to BIPAP        Addendum: recommendations as per Cardiology NP  - NPO after midnight for possible cardiac Cath tomorrow

## 2024-12-10 NOTE — CONSULT NOTE ADULT - SUBJECTIVE AND OBJECTIVE BOX
CARDIOLOGY CONSULT NOTE     CHIEF COMPLAINT/REASON FOR CONSULT:    HPI:  65 yo female PMHx of COPD (on home O2 5L) CAD s/p stents (on Plavix)  HTN presents to ER c/o shortness of breath x 1 week. pt states she had URI symptoms after Thanksgiving, she developed a productive cough with yellow sputum and worsening shortness of breath. Pt states today she had dyspnea only walking a few steps and was unable to catch her breath, no relief from rescue inhaler. In ER pt was placed on bipap, found to be acidotic and hypercapnic, also had elevated troponin, no EKG changes. ICU consulted and recommend admission to telemetry. Denies fever, chills, n/v/d, abdominal pain, dysuria.   (09 Dec 2024 23:00)      PAST MEDICAL & SURGICAL HISTORY:  HTN (hypertension)      Chronic obstructive pulmonary disease (COPD)      Hyperlipidemia      CAD (coronary artery disease)      S/P cholecystectomy      H/O tear of meniscus of knee joint          Cardiac Risks:   [x ]HTN, [ ] DM, [ ] Smoking, [ ] FH,  [x ] Lipids        MEDICATIONS:  MEDICATIONS  (STANDING):  albuterol/ipratropium for Nebulization 3 milliLiter(s) Nebulizer every 6 hours  aspirin  chewable 81 milliGRAM(s) Oral daily  atorvastatin 40 milliGRAM(s) Oral at bedtime  cefTRIAXone   IVPB 1000 milliGRAM(s) IV Intermittent every 24 hours  chlorhexidine 2% Cloths 1 Application(s) Topical <User Schedule>  clopidogrel Tablet 75 milliGRAM(s) Oral daily  hydrochlorothiazide 25 milliGRAM(s) Oral daily  influenza  Vaccine (HIGH DOSE) 0.5 milliLiter(s) IntraMuscular once  methylPREDNISolone sodium succinate Injectable 40 milliGRAM(s) IV Push every 8 hours  pantoprazole    Tablet 40 milliGRAM(s) Oral before breakfast      FAMILY HISTORY:  FH: myocardial infarction (Father)    Family history of valvular heart disease (Mother)        SOCIAL HISTORY:      Allergies    No Known Allergies      	    REVIEW OF SYSTEMS:  CONSTITUTIONAL: No fever, weight loss, or fatigue  EYES: No eye pain, visual disturbances, or discharge  ENMT:  No difficulty hearing, tinnitus, vertigo; No sinus or throat pain  NECK: No pain or stiffness  RESPIRATORY: No cough, wheezing, chills or hemoptysis; No Shortness of Breath  CARDIOVASCULAR:  See above  GASTROINTESTINAL: No abdominal or epigastric pain. No nausea, vomiting, or hematemesis; No diarrhea or constipation. No melena or hematochezia.  GENITOURINARY: No dysuria, frequency, hematuria, or incontinence  NEUROLOGICAL: No headaches, memory loss, loss of strength, numbness, or tremors  SKIN: No itching, burning, rashes, or lesions       PHYSICAL EXAM:  T(C): 36.6 (12-10-24 @ 05:00), Max: 36.8 (12-10-24 @ 01:26)  HR: 86 (12-10-24 @ 05:00) (70 - 86)  BP: 105/69 (12-10-24 @ 05:00) (105/69 - 124/60)  RR: 18 (12-10-24 @ 05:52) (18 - 20)  SpO2: 98% (12-10-24 @ 08:25) (97% - 100%)  Wt(kg): --  I&O's Summary      Appearance: Normal	  Psychiatry: A & O x 3, Mood & affect appropriate  HEENT:   Normal oral mucosa, PERRL, EOMI	  Lymphatic: No lymphadenopathy  Cardiovascular: Normal S1 S2,RRR, No JVD, No murmurs  Respiratory: Lungs clear to auscultation	decreased bs  Gastrointestinal:  Soft, Non-tender, + BS	  Skin: No rashes, No ecchymoses, No cyanosis	  Neurologic: Non-focal  Extremities: Normal range of motion, No clubbing, cyanosis or edema  Vascular: Peripheral pulses palpable 2+ bilaterally      ECG:  	  < from: 12 Lead ECG (12.09.24 @ 19:37) >  Diagnosis Line Sinus rhythm withFusion complexes  Left axis deviation  Incomplete right bundle branch block  Inferior infarct , age undetermined  Anteroseptal infarct , age undetermined  Abnormal ECG    Confirmed by PAMELA VALDERRAMA MD ([id number]) on 12/10/2024 6:58:31 AM    < end of copied text >    	  LABS:	 	    CARDIAC MARKERS:                                    10.9   12.59 )-----------( 390      ( 10 Dec 2024 07:09 )             37.2     12-10    140  |  93[L]  |  19  ----------------------------<  186[H]  4.7   |  38[H]  |  0.6[L]    Ca    9.7      10 Dec 2024 07:09  Mg     2.4     12-10    TPro  6.4  /  Alb  3.6  /  TBili  0.2  /  DBili  x   /  AST  15  /  ALT  15  /  AlkPhos  95  12-09                  
  Patient is a 64y old  Female who presents with a chief complaint of     HPI:  63 yo female PMHx of COPD (on home O2 5L) CAD s/p stents (on Plavix)  HTN presents to ER c/o shortness of breath x 1 week. pt states she had URI symptoms after Thanksgiving, she developed a productive cough with yellow sputum and worsening shortness of breath. Pt states today she had dyspnea only walking a few steps and was unable to catch her breath, no relief from rescue inhaler. In ER pt was placed on bipap, found to be acidotic and hypercapnic, also had elevated troponin, no EKG changes. ICU consulted and recommend admission to telemetry. Denies fever, chills, n/v/d, abdominal pain, dysuria.   (09 Dec 2024 23:00)      PAST MEDICAL & SURGICAL HISTORY:  HTN (hypertension)      Chronic obstructive pulmonary disease (COPD)      Hyperlipidemia      CAD (coronary artery disease)      S/P cholecystectomy      H/O tear of meniscus of knee joint        Allergies    No Known Allergies    Intolerances      Family history : no cardiovscular family history   Home Medications:  ALBUTEROL HFA 90 MCG INHALER: INHALE 2 PUFFS INTO THE LUNGS EVERY 4 HOURS AS NEEDED FOR 90 DAYS (10 Dec 2024 00:30)  BUDES/FORMOT 160-4.5 INH:  (10 Dec 2024 00:30)  HYDROCHLOROT 25MG TAB:  (10 Dec 2024 00:30)  ROSUVASTATIN 10MG TAB:  (10 Dec 2024 00:30)    Occupation:  Alochol: Denied  Smoking: Denied  Drug Use: Denied  Marital Status:         ROS: as in HPI; All other systems reviewed are negative    ICU Vital Signs Last 24 Hrs  T(C): 36.6 (10 Dec 2024 05:00), Max: 36.8 (10 Dec 2024 01:26)  T(F): 97.9 (10 Dec 2024 05:00), Max: 98.2 (10 Dec 2024 01:26)  HR: 86 (10 Dec 2024 05:00) (70 - 86)  BP: 105/69 (10 Dec 2024 05:00) (105/69 - 124/60)  BP(mean): --  ABP: --  ABP(mean): --  RR: 18 (10 Dec 2024 05:52) (18 - 20)  SpO2: 98% (10 Dec 2024 08:25) (97% - 100%)    O2 Parameters below as of 10 Dec 2024 05:52  Patient On (Oxygen Delivery Method): nasal cannula  O2 Flow (L/min): 5            Physical Examination:    General: No acute distress.  Alert, oriented, interactive, nonfocal    HEENT: Pupils equal, reactive to light.  Symmetric.    PULM: b/l wheezing     CVS: Regular rate and rhythm, no murmurs, rubs, or gallops    ABD: Soft, nondistended, nontender, normoactive bowel sounds, no masses    EXT: No edema, nontender, no clubbing     SKIN: Warm and well perfused, no rashes noted.    Neurology : no motor or sensory deficit     Musculoskeletal : move all extremity                    I&O's Detail        LABS:                        10.9   12.59 )-----------( 390      ( 10 Dec 2024 07:09 )             37.2     10 Dec 2024 07:09    140    |  93     |  19     ----------------------------<  186    4.7     |  38     |  0.6      Ca    9.7        10 Dec 2024 07:09  Mg     2.4       10 Dec 2024 07:09    TPro  6.4    /  Alb  3.6    /  TBili  0.2    /  DBili  x      /  AST  15     /  ALT  15     /  AlkPhos  95     09 Dec 2024 20:35  Amylase x     lipase x              CAPILLARY BLOOD GLUCOSE          Urinalysis Basic - ( 10 Dec 2024 07:09 )    Color: x / Appearance: x / SG: x / pH: x  Gluc: 186 mg/dL / Ketone: x  / Bili: x / Urobili: x   Blood: x / Protein: x / Nitrite: x   Leuk Esterase: x / RBC: x / WBC x   Sq Epi: x / Non Sq Epi: x / Bacteria: x      Culture        MEDICATIONS  (STANDING):  albuterol/ipratropium for Nebulization 3 milliLiter(s) Nebulizer every 6 hours  aspirin  chewable 81 milliGRAM(s) Oral daily  atorvastatin 40 milliGRAM(s) Oral at bedtime  cefTRIAXone   IVPB 1000 milliGRAM(s) IV Intermittent every 24 hours  chlorhexidine 2% Cloths 1 Application(s) Topical <User Schedule>  clopidogrel Tablet 75 milliGRAM(s) Oral daily  hydrochlorothiazide 25 milliGRAM(s) Oral daily  influenza  Vaccine (HIGH DOSE) 0.5 milliLiter(s) IntraMuscular once  methylPREDNISolone sodium succinate Injectable 40 milliGRAM(s) IV Push every 8 hours  pantoprazole    Tablet 40 milliGRAM(s) Oral before breakfast    MEDICATIONS  (PRN):  acetaminophen     Tablet .. 650 milliGRAM(s) Oral every 6 hours PRN Temp greater or equal to 38C (100.4F), Mild Pain (1 - 3)  aluminum hydroxide/magnesium hydroxide/simethicone Suspension 30 milliLiter(s) Oral every 4 hours PRN Dyspepsia  melatonin 5 milliGRAM(s) Oral at bedtime PRN Insomnia  ondansetron Injectable 4 milliGRAM(s) IV Push every 8 hours PRN Nausea and/or Vomiting        RADIOLOGY: ***     CXR: reviewed by me cardiomegaly bibasilar opacity   TLC:  OG:  ET tube:        [facility] ICU:  ECHO:        
  Patient is a 64y old  Female who presents with a chief complaint of sob    HPI:  Pt is a 63 yo female PMHx sig for COPD on home O2, cad- hx of stent, HTN, presents w sob for few days, states she deveoled a "cough" w yellowish sputum, denies fever or chills.  pt is comfortable in no distress. started on Bipap due to hypercapnia on abg.  Consult also requested for elevated troponin no ekg changes      PAST MEDICAL & SURGICAL HISTORY:  HTN (hypertension)      Chronic obstructive pulmonary disease (COPD)      Hyperlipidemia      CAD (coronary artery disease)      S/P cholecystectomy      H/O tear of meniscus of knee joint        Allergies    No Known Allergies    Intolerances      FAMILY HISTORY:  FH: myocardial infarction (Father)    Family history of valvular heart disease (Mother)      Home Medications:    Occupation:  Alochol: Denied  Smoking: Denied  Drug Use: Denied  Marital Status:         ROS: as in HPI; All other systems reviewed are negative    ICU Vital Signs Last 24 Hrs  T(C): 36.6 (09 Dec 2024 19:42), Max: 36.6 (09 Dec 2024 19:42)  T(F): 97.9 (09 Dec 2024 19:42), Max: 97.9 (09 Dec 2024 19:42)  HR: 70 (09 Dec 2024 20:51) (70 - 82)  BP: 114/69 (09 Dec 2024 19:42) (114/69 - 114/69)  BP(mean): --  ABP: --  ABP(mean): --  RR: 18 (09 Dec 2024 19:42) (18 - 18)  SpO2: 98% (09 Dec 2024 20:51) (98% - 100%)    O2 Parameters below as of 09 Dec 2024 20:51  Patient On (Oxygen Delivery Method): BiPAP/CPAP    O2 Concentration (%): 45          Physical Examination:    General: Obese, No acute distress.  Alert, oriented, interactive, nonfocal    HEENT: Pupils equal, reactive to light.  Symmetric.    PULM: Clear to auscultation bilaterally, no significant sputum production    CVS: Regular rate and rhythm, no murmurs, rubs, or gallops    ABD: Soft, nondistended, nontender, normoactive bowel sounds, no masses    EXT: No edema, nontender    SKIN: Warm and well perfused, no rashes noted.              I&O's Detail        LABS:                        10.6   12.29 )-----------( 359      ( 09 Dec 2024 20:35 )             35.9     09 Dec 2024 20:35    142    |  95     |  20     ----------------------------<  156    4.4     |  39     |  0.5      Ca    9.1        09 Dec 2024 20:35  Mg     2.4       09 Dec 2024 20:35    TPro  6.4    /  Alb  3.6    /  TBili  0.2    /  DBili  x      /  AST  15     /  ALT  15     /  AlkPhos  95     09 Dec 2024 20:35  Amylase x     lipase x              CAPILLARY BLOOD GLUCOSE          Urinalysis Basic - ( 09 Dec 2024 20:35 )    Color: x / Appearance: x / SG: x / pH: x  Gluc: 156 mg/dL / Ketone: x  / Bili: x / Urobili: x   Blood: x / Protein: x / Nitrite: x   Leuk Esterase: x / RBC: x / WBC x   Sq Epi: x / Non Sq Epi: x / Bacteria: x      Culture        MEDICATIONS  (STANDING):  albuterol/ipratropium for Nebulization 3 milliLiter(s) Nebulizer every 20 minutes  aspirin  chewable 324 milliGRAM(s) Oral once  enoxaparin Injectable 115 milliGRAM(s) SubCutaneous Once    MEDICATIONS  (PRN):

## 2024-12-10 NOTE — PROGRESS NOTE ADULT - SUBJECTIVE AND OBJECTIVE BOX
63 yo female PMHx of COPD (on home O2 5L) CAD s/p stents (on Plavix)  HTN presents to ER c/o shortness of breath x 1 week. pt states she had URI symptoms after Thanksgiving, she developed a productive cough with yellow sputum and worsening shortness of breath. Pt states today she had dyspnea only walking a few steps and was unable to catch her breath, no relief from rescue inhaler. In ER pt was placed on bipap, found to be acidotic and hypercapnic, also had elevated troponin, no EKG changes. ICU consulted and recommend admission to telemetry. Denies fever, chills, n/v/d, abdominal pain, dysuria.    PAST MEDICAL & SURGICAL HISTORY:    HTN (hypertension)  Chronic obstructive pulmonary disease (COPD)  Hyperlipidemia  CAD (coronary artery disease)  S/P cholecystectomy  H/O tear of meniscus of knee joint    Social History:  smoker x 25 years, quit 3 years ago (09 Dec 2024 23:00)    MEDICATIONS  (STANDING):  albuterol/ipratropium for Nebulization 3 milliLiter(s) Nebulizer every 6 hours  aspirin  chewable 81 milliGRAM(s) Oral daily  atorvastatin 40 milliGRAM(s) Oral at bedtime  azithromycin  IVPB 500 milliGRAM(s) IV Intermittent every 24 hours  cefTRIAXone   IVPB 1000 milliGRAM(s) IV Intermittent every 24 hours  chlorhexidine 2% Cloths 1 Application(s) Topical <User Schedule>  clopidogrel Tablet 75 milliGRAM(s) Oral daily  fluticasone propionate/ salmeterol 250-50 MICROgram(s) Diskus 1 Dose(s) Inhalation two times a day  hydrochlorothiazide 25 milliGRAM(s) Oral daily  influenza  Vaccine (HIGH DOSE) 0.5 milliLiter(s) IntraMuscular once  methylPREDNISolone sodium succinate Injectable 60 milliGRAM(s) IV Push every 12 hours  pantoprazole    Tablet 40 milliGRAM(s) Oral before breakfast    MEDICATIONS  (PRN):  acetaminophen     Tablet .. 650 milliGRAM(s) Oral every 6 hours PRN Temp greater or equal to 38C (100.4F), Mild Pain (1 - 3)  aluminum hydroxide/magnesium hydroxide/simethicone Suspension 30 milliLiter(s) Oral every 4 hours PRN Dyspepsia  melatonin 5 milliGRAM(s) Oral at bedtime PRN Insomnia  ondansetron Injectable 4 milliGRAM(s) IV Push every 8 hours PRN Nausea and/or Vomiting    Allergies    No Known Allergies    Intolerances    Vital Signs Last 24 Hrs  T(C): 36.6 (10 Dec 2024 05:00), Max: 36.8 (10 Dec 2024 01:26)  T(F): 97.9 (10 Dec 2024 05:00), Max: 98.2 (10 Dec 2024 01:26)  HR: 86 (10 Dec 2024 05:00) (70 - 86)  BP: 105/69 (10 Dec 2024 05:00) (105/69 - 124/60)  BP(mean): --  RR: 18 (10 Dec 2024 05:52) (18 - 20)  SpO2: 98% (10 Dec 2024 08:25) (97% - 100%)    Parameters below as of 10 Dec 2024 05:52  Patient On (Oxygen Delivery Method): nasal cannula  O2 Flow (L/min): 5    Constitutional: NAD on bipap   HEENT: Airway patent, moist MM, no erythema/swelling/deformity of oral structures. EOMI, PERRLA.  CV: regular rate, regular rhythm, well-perfused extremities, 2+ b/l DP and radial pulses equal.  Lungs: + wheeze,  no rales, rhonchi   ABD: NTND, no guarding or rebound, no pulsatile mass, no hernias.   MSK: Neck supple, nontender, nl ROM, no stepoff. Chest nontender. Back nontender in TLS spine or to b/l bony structures or flanks. Ext nontender, nl rom, no deformity.   INTEG: Skin warm, dry, no rash.  NEURO: A&Ox3, normal strength, nl sensation throughout, normal speech.   PSYCH: Denies SI/HI/hallucinations    LAB:                           10.9   12.59 )-----------( 390      ( 10 Dec 2024 07:09 )             37.2     12-10    140  |  93[L]  |  19  ----------------------------<  186[H]  4.7   |  38[H]  |  0.6[L]    Ca    9.7      10 Dec 2024 07:09  Mg     2.4     12-10    TPro  6.4  /  Alb  3.6  /  TBili  0.2  /  DBili  x   /  AST  15  /  ALT  15  /  AlkPhos  95  12-09    Troponin T, High Sensitivity Result: 102    RAD:    ACC: [id number] EXAM:  XR CHEST PORTABLE URGENT 1V   ORDERED BY: SHELTON QUINN     PROCEDURE DATE:  12/09/2024          INTERPRETATION:  CLINICAL HISTORY: Shortness of breath    COMPARISON: 4/7/2005.    TECHNIQUE: Frontal    FINDINGS:    Cardiac/mediastinum/hilum: Heart is enlarged    Lung parenchyma/Pleura: Right-sided lung opacities    Skeleton/soft tissues: Degenerative change..      IMPRESSION:    Right-sided lung opacities    --- End of Report ---            HIREN SILVA MD; Attending Radiologist  This document has been electronically signed. Dec 10 2024 11:50AM

## 2024-12-10 NOTE — PROGRESS NOTE ADULT - ASSESSMENT
63 yo female PMHx of COPD (on home O2 5L) CAD s/p stents (on Plavix)  HTN presents to ER c/o shortness of breath x 1 week.     # NSTEMI   # h/o CAD s/p stents      - cardiac monitoring   - trend troponin  +  - echo   - cardiology consult reviewed  - pt anticoagulated in ER  - c/w Plavix      # hypercapnic acidosis respiratory failure   # COPD exacerbation   # URI/cough   - supplemental O2, wean Bipap  - c/w solumedrol   - c/w inhalers   - nebs prn   - IV Rocephin   - pulmonary consult     # h/o HTN  c/w HCTZ    # h/o HLD  c/w statin     # dvt ppx   # gi ppx   # full code

## 2024-12-10 NOTE — PROGRESS NOTE ADULT - ASSESSMENT
65 yo female pmh CAD s/p stents (2022) at [facility] (on Plavix)  HTN, COPD (on home O2 5L) , recent URI  presents to ER c/o shortness of breath x 1 week.       Trop 157>154>102  ecg NSR IRBBB; no acute ischemic changes    Plan  # Troponin elevation  # Dyspnea  breathing improving but still  with significant bilateral wheezing and congestion  Troponin peaked and downtrended no need to trend further   sob similar to symptoms prior to her stents  She has SHANKAR .   She uses dental device.   f/u Check cxr .   get ECHO   She had breakfast this am   NPO from midnight except meds  Will discuss with Dr Stark re Possible cardiac cath

## 2024-12-10 NOTE — PROGRESS NOTE ADULT - SUBJECTIVE AND OBJECTIVE BOX
Subjective/Objective:     64 year old female who presents for sob    MEDICATIONS  (STANDING):  albuterol/ipratropium for Nebulization 3 milliLiter(s) Nebulizer every 6 hours  aspirin  chewable 81 milliGRAM(s) Oral daily  atorvastatin 40 milliGRAM(s) Oral at bedtime  azithromycin  IVPB 500 milliGRAM(s) IV Intermittent every 24 hours  cefTRIAXone   IVPB 1000 milliGRAM(s) IV Intermittent every 24 hours  chlorhexidine 2% Cloths 1 Application(s) Topical <User Schedule>  clopidogrel Tablet 75 milliGRAM(s) Oral daily  fluticasone propionate/ salmeterol 250-50 MICROgram(s) Diskus 1 Dose(s) Inhalation two times a day  hydrochlorothiazide 25 milliGRAM(s) Oral daily  influenza  Vaccine (HIGH DOSE) 0.5 milliLiter(s) IntraMuscular once  methylPREDNISolone sodium succinate Injectable 60 milliGRAM(s) IV Push every 12 hours  pantoprazole    Tablet 40 milliGRAM(s) Oral before breakfast    MEDICATIONS  (PRN):  acetaminophen     Tablet .. 650 milliGRAM(s) Oral every 6 hours PRN Temp greater or equal to 38C (100.4F), Mild Pain (1 - 3)  aluminum hydroxide/magnesium hydroxide/simethicone Suspension 30 milliLiter(s) Oral every 4 hours PRN Dyspepsia  melatonin 5 milliGRAM(s) Oral at bedtime PRN Insomnia  ondansetron Injectable 4 milliGRAM(s) IV Push every 8 hours PRN Nausea and/or Vomiting          Vital Signs Last 24 Hrs  T(C): 36.6 (10 Dec 2024 05:00), Max: 36.8 (10 Dec 2024 01:26)  T(F): 97.9 (10 Dec 2024 05:00), Max: 98.2 (10 Dec 2024 01:26)  HR: 86 (10 Dec 2024 05:00) (70 - 86)  BP: 105/69 (10 Dec 2024 05:00) (105/69 - 124/60)  BP(mean): --  RR: 18 (10 Dec 2024 05:52) (18 - 20)  SpO2: 98% (10 Dec 2024 08:25) (97% - 100%)    Parameters below as of 10 Dec 2024 05:52  Patient On (Oxygen Delivery Method): nasal cannula  O2 Flow (L/min): 5    I&O's Detail      PHYSICAL EXAM    GENERAL:  63y/o Female NAD, resting comfortably.  HEAD:  Atraumatic, Normocephalic  EYES: EOMI, PERRLA, conjunctiva and sclera clear  NECK: Supple, No JVD, no cervical lymphadenopathy, non-tender  CHEST/LUNG: +BILATERAL WHEEZING  HEART: Regular rate and rhythm; S1&S2  ABDOMEN: Soft, Nontender, Nondistended x 4 quadrants; Bowel sounds present  EXTREMITIES:   Peripheral Pulses Present, No clubbing, no cyanosis, or no edema, no calf tenderness  PSYCH: AAOx3, cooperative, appropriate  NEUROLOGY: WNL  SKIN: WNL        EKG/ TELEM:    LABS:                          10.9   12.59 )-----------( 390      ( 10 Dec 2024 07:09 )             37.2       10 Dec 2024 07:09    140    |  93[L]  |  19     ----------------------------<  186[H]  4.7     |  38[H]  |  0.6[L]  09 Dec 2024 20:35    142    |  95[L]  |  20     ----------------------------<  156[H]  4.4     |  39[H]  |  0.5[L]    Ca    9.7        10 Dec 2024 07:09  Ca    9.1        09 Dec 2024 20:35  Mg     2.4       10 Dec 2024 07:09  Mg     2.4       09 Dec 2024 20:35    TPro  6.4    /  Alb  3.6    /  TBili  0.2    /  DBili  x      /  AST  15     /  ALT  15     /  AlkPhos  95     09 Dec 2024 20:35                      Diagnostic testing:    CATH  < from: Cardiac Catheterization (03.24.22 @ 14:12) >  Conclusions:         - Successful PCI of mRCA recanalized  with 3.0x38mm NATHANAEL followed by    post dilation with 3.0mm NC balloon, there was a    jailed acute marginal branch that was pinched and lost flow, which was    ballooned to restore KIZZY 3 flow    - Patent recently placed LAD stents         Recommendations:         - Continue DAPT, statins and other medical therapies for CAD     - Risk factors modifications for cAD     - Follow up with  Dr Robi Goel in 1 week post discharge      < end of copied text >  < from: 12 Lead ECG (12.09.24 @ 19:37) >  Ventricular Rate 87 BPM    Atrial Rate 87 BPM    P-R Interval 190 ms    QRS Duration 106 ms    Q-T Interval 402 ms    QTC Calculation(Bazett) 483 ms    P Axis 60 degrees    R Axis -48 degrees    T Axis 69 degrees    Diagnosis Line Sinus rhythm withFusion complexes  Left axis deviation  Incomplete right bundle branch block  Inferior infarct , age undetermined  Anteroseptal infarct , age undetermined  Abnormal ECG    Confirmed by LAVELLE JEFFRIES, PAMELA ([id number]) on 12/10/2024 6:58:31 AM    < end of copied text >        Assessment and Plan:

## 2024-12-11 ENCOUNTER — APPOINTMENT (OUTPATIENT)
Dept: PULMONOLOGY | Facility: CLINIC | Age: 64
End: 2024-12-11

## 2024-12-11 LAB
ANION GAP SERPL CALC-SCNC: 11 MMOL/L — SIGNIFICANT CHANGE UP (ref 7–14)
BUN SERPL-MCNC: 22 MG/DL — HIGH (ref 10–20)
CALCIUM SERPL-MCNC: 10 MG/DL — SIGNIFICANT CHANGE UP (ref 8.4–10.5)
CHLORIDE SERPL-SCNC: 92 MMOL/L — LOW (ref 98–110)
CO2 SERPL-SCNC: 39 MMOL/L — HIGH (ref 17–32)
CREAT SERPL-MCNC: 0.7 MG/DL — SIGNIFICANT CHANGE UP (ref 0.7–1.5)
EGFR: 95 ML/MIN/1.73M2 — SIGNIFICANT CHANGE UP
GLUCOSE SERPL-MCNC: 187 MG/DL — HIGH (ref 70–99)
HCT VFR BLD CALC: 36.9 % — LOW (ref 37–47)
HGB BLD-MCNC: 11.2 G/DL — LOW (ref 12–16)
MAGNESIUM SERPL-MCNC: 2.5 MG/DL — HIGH (ref 1.8–2.4)
MCHC RBC-ENTMCNC: 26.9 PG — LOW (ref 27–31)
MCHC RBC-ENTMCNC: 30.4 G/DL — LOW (ref 32–37)
MCV RBC AUTO: 88.7 FL — SIGNIFICANT CHANGE UP (ref 81–99)
NRBC # BLD: 0 /100 WBCS — SIGNIFICANT CHANGE UP (ref 0–0)
PLATELET # BLD AUTO: 492 K/UL — HIGH (ref 130–400)
PMV BLD: 9.8 FL — SIGNIFICANT CHANGE UP (ref 7.4–10.4)
POTASSIUM SERPL-MCNC: 4.7 MMOL/L — SIGNIFICANT CHANGE UP (ref 3.5–5)
POTASSIUM SERPL-SCNC: 4.7 MMOL/L — SIGNIFICANT CHANGE UP (ref 3.5–5)
RAPID RVP RESULT: SIGNIFICANT CHANGE UP
RBC # BLD: 4.16 M/UL — LOW (ref 4.2–5.4)
RBC # FLD: 13.4 % — SIGNIFICANT CHANGE UP (ref 11.5–14.5)
SARS-COV-2 RNA SPEC QL NAA+PROBE: SIGNIFICANT CHANGE UP
SODIUM SERPL-SCNC: 142 MMOL/L — SIGNIFICANT CHANGE UP (ref 135–146)
WBC # BLD: 15.1 K/UL — HIGH (ref 4.8–10.8)
WBC # FLD AUTO: 15.1 K/UL — HIGH (ref 4.8–10.8)

## 2024-12-11 PROCEDURE — 71045 X-RAY EXAM CHEST 1 VIEW: CPT | Mod: 26

## 2024-12-11 PROCEDURE — 99232 SBSQ HOSP IP/OBS MODERATE 35: CPT

## 2024-12-11 RX ORDER — FLUTICASONE FUROATE, UMECLIDINIUM BROMIDE AND VILANTEROL TRIFENATATE 100; 62.5; 25 UG/1; UG/1; UG/1
100-62.5-25 POWDER RESPIRATORY (INHALATION)
Qty: 60 | Refills: 2 | Status: ACTIVE | COMMUNITY
Start: 2024-12-11 | End: 1900-01-01

## 2024-12-11 RX ORDER — GUAIFENESIN/DEXTROMETHORPHAN 100-10MG/5
10 SYRUP ORAL
Refills: 0 | Status: DISCONTINUED | OUTPATIENT
Start: 2024-12-11 | End: 2024-12-21

## 2024-12-11 RX ADMIN — Medication 60 MILLIGRAM(S): at 17:50

## 2024-12-11 RX ADMIN — Medication 100 MILLIGRAM(S): at 05:16

## 2024-12-11 RX ADMIN — Medication 81 MILLIGRAM(S): at 12:57

## 2024-12-11 RX ADMIN — IPRATROPIUM BROMIDE AND ALBUTEROL SULFATE 3 MILLILITER(S): 2.5; .5 SOLUTION RESPIRATORY (INHALATION) at 09:23

## 2024-12-11 RX ADMIN — Medication 10 MILLILITER(S): at 12:55

## 2024-12-11 RX ADMIN — Medication 60 MILLIGRAM(S): at 05:15

## 2024-12-11 RX ADMIN — IPRATROPIUM BROMIDE AND ALBUTEROL SULFATE 3 MILLILITER(S): 2.5; .5 SOLUTION RESPIRATORY (INHALATION) at 15:23

## 2024-12-11 RX ADMIN — ACETAMINOPHEN 500MG 650 MILLIGRAM(S): 500 TABLET, COATED ORAL at 12:49

## 2024-12-11 RX ADMIN — Medication 10 MILLILITER(S): at 20:55

## 2024-12-11 RX ADMIN — CLOPIDOGREL 75 MILLIGRAM(S): 75 TABLET, FILM COATED ORAL at 13:03

## 2024-12-11 RX ADMIN — Medication 40 MILLIGRAM(S): at 20:57

## 2024-12-11 RX ADMIN — PANTOPRAZOLE SODIUM 40 MILLIGRAM(S): 40 TABLET, DELAYED RELEASE ORAL at 05:16

## 2024-12-11 RX ADMIN — ACETAMINOPHEN 500MG 650 MILLIGRAM(S): 500 TABLET, COATED ORAL at 13:15

## 2024-12-11 RX ADMIN — AZITHROMYCIN 255 MILLIGRAM(S): 250 TABLET, FILM COATED ORAL at 10:16

## 2024-12-11 RX ADMIN — IPRATROPIUM BROMIDE AND ALBUTEROL SULFATE 3 MILLILITER(S): 2.5; .5 SOLUTION RESPIRATORY (INHALATION) at 19:21

## 2024-12-11 NOTE — PROGRESS NOTE ADULT - ASSESSMENT
63 yo female pmh CAD s/p stents (2022) at [facility] (on Plavix)  HTN, COPD (on home O2 5L) , recent URI  presents to ER c/o shortness of breath x 1 week.       Trop 157>154>102  ecg NSR IRBBB; no acute ischemic changes  ECHO 12/10/24: normal global lvsf EF 54% sev concentric lvh    Plan  # Troponin elevation  # Dyspnea  breathing improving but still  with some bilateral wheezing and congestion  Troponin peaked and downtrended no need to trend further   sob similar to symptoms prior to her stents  labs and imaging reviewed   case discussed with patient's cardiologist Dr Stark. Plan is for inpatient LHC on Fri 12/13 at [facility]  she needs to be transferred [place] on Thur 12/12 in anticipation of cath   keep npo except meds from [facility]  She uses dental device.   prn O2 and neb tx  follow RVP  pt aware and agreeable to cath

## 2024-12-11 NOTE — PROGRESS NOTE ADULT - SUBJECTIVE AND OBJECTIVE BOX
65 yo female PMHx of COPD (on home O2 5L) CAD s/p stents (on Plavix)  HTN  shortness o   PAST MEDICAL & SURGICAL HISTORY:    HTN (hypertension)  Chronic obstructive pulmonary disease (COPD)  Hyperlipidemia  CAD (coronary artery disease)  S/P cholecystectomy  H/O tear of meniscus of knee joint    Social History:  smoker x 25 years, quit 3 years ago (09 Dec 2024 23:00)    MEDICATIONS  (STANDING):  albuterol/ipratropium for Nebulization 3 milliLiter(s) Nebulizer every 6 hours  aspirin  chewable 81 milliGRAM(s) Oral daily  atorvastatin 40 milliGRAM(s) Oral at bedtime  azithromycin  IVPB 500 milliGRAM(s) IV Intermittent every 24 hours  cefTRIAXone   IVPB 1000 milliGRAM(s) IV Intermittent every 24 hours  chlorhexidine 2% Cloths 1 Application(s) Topical <User Schedule>  clopidogrel Tablet 75 milliGRAM(s) Oral daily  fluticasone propionate/ salmeterol 250-50 MICROgram(s) Diskus 1 Dose(s) Inhalation two times a day  hydrochlorothiazide 25 milliGRAM(s) Oral daily  influenza  Vaccine (HIGH DOSE) 0.5 milliLiter(s) IntraMuscular once  methylPREDNISolone sodium succinate Injectable 60 milliGRAM(s) IV Push every 12 hours  pantoprazole    Tablet 40 milliGRAM(s) Oral before breakfast    MEDICATIONS  (PRN):  acetaminophen     Tablet .. 650 milliGRAM(s) Oral every 6 hours PRN Temp greater or equal to 38C (100.4F), Mild Pain (1 - 3)  aluminum hydroxide/magnesium hydroxide/simethicone Suspension 30 milliLiter(s) Oral every 4 hours PRN Dyspepsia  melatonin 5 milliGRAM(s) Oral at bedtime PRN Insomnia  ondansetron Injectable 4 milliGRAM(s) IV Push every 8 hours PRN Nausea and/or Vomiting    Allergies    No Known Allergies    Intolerances    Vital Signs Last 24 Hrs  T(C): 36.5 (11 Dec 2024 04:40), Max: 37.2 (10 Dec 2024 14:26)  T(F): 97.7 (11 Dec 2024 04:40), Max: 98.9 (10 Dec 2024 14:26)  HR: 76 (11 Dec 2024 04:40) (76 - 89)  BP: 136/73 (11 Dec 2024 04:40) (127/86 - 142/69)  BP(mean): 94 (11 Dec 2024 04:40) (94 - 94)  RR: 18 (11 Dec 2024 04:40) (16 - 18)  SpO2: 97% (11 Dec 2024 04:40) (95% - 99%)    Parameters below as of 10 Dec 2024 20:29  Patient On (Oxygen Delivery Method): nasal cannula  O2 Flow (L/min): 4      Constitutional: NAD   HEENT: Airway patent, moist MM, no erythema/swelling/deformity of oral structures. EOMI, PERRLA.  CV: regular rate, regular rhythm, well-perfused extremities, 2+ b/l DP and radial pulses equal.  Lungs: + wheeze,  no rales, rhonchi   ABD: NTND, no guarding or rebound, no pulsatile mass, no hernias.   MSK: Neck supple, nontender, nl ROM, no stepoff. Chest nontender. Back nontender in TLS spine or to b/l bony structures or flanks. Ext nontender, nl rom, no deformity.   INTEG: Skin warm, dry, no rash.  NEURO: A&Ox3, normal strength, nl sensation throughout, normal speech.   PSYCH: Denies SI/HI/hallucinations    LAB:                           10.9   12.59 )-----------( 390      ( 10 Dec 2024 07:09 )             37.2     12-10    140  |  93[L]  |  19  ----------------------------<  186[H]  4.7   |  38[H]  |  0.6[L]    Ca    9.7      10 Dec 2024 07:09  Mg     2.4     12-10    TPro  6.4  /  Alb  3.6  /  TBili  0.2  /  DBili  x   /  AST  15  /  ALT  15  /  AlkPhos  95  12-09    Troponin T, High Sensitivity Result: 102    RAD:    ACC: [id number] EXAM:  XR CHEST PORTABLE URGENT 1V   ORDERED BY: SHELTON QUINN     PROCEDURE DATE:  12/09/2024          INTERPRETATION:  CLINICAL HISTORY: Shortness of breath    COMPARISON: 4/7/2005.    TECHNIQUE: Frontal    FINDINGS:    Cardiac/mediastinum/hilum: Heart is enlarged    Lung parenchyma/Pleura: Right-sided lung opacities    Skeleton/soft tissues: Degenerative change..      IMPRESSION:    Right-sided lung opacities    --- End of Report ---            HIREN SILVA MD; Attending Radiologist  This document has been electronically signed. Dec 10 2024 11:50AM      ACC: [id number] EXAM:  ECHO TTE WO CON COMP W DOPP                          PROCEDURE DATE:  12/10/2024          INTERPRETATION:   [place] [address]                Phone: [phone].   TRANSTHORACIC ECHOCARDIOGRAM REPORT        Patient Name:   HARIS TO Accession #: [id number]  Medical Rec #:  [id number]    Height:      62.0 in 157.5 cm  YOB: 1960   Weight:      281.0 lb 127.46 kg  Patient Age:    64 years    BSA:         2.21 m²  Patient Gender: F           BP:          105/69 mmHg      Date of Exam:        12/10/2024 12:49:44 PM  Referring Physician: Zeynep Mccarthy  Sonographer:         Nancy Scales  Reading Physician:   Santiago Tam.    Procedure:     2D Echo/Doppler/Color Doppler Complete.  Indications:   R06.00 - Dyspnea, unspecified  Diagnosis:     R06.00 - Dyspnea, unspecified  Study Details: Technically limited study.        Summary:   1. Technically limited study.   2. Normal global left ventricular systolic function.   3. LV Ejection Fraction by Omalley's Method with a biplane EF of 54 %.   4. LV apex not well visualized.   5. Severe concentric left ventricular hypertrophy.   6. The left ventricular diastolic function could not be assessed in this   study.   7. Normal left atrial size.   8. Normal right atrial size.   9. Mild aortic valve stenosis.  10. Mild thickening and calcification of the posterior mitral valve   leaflet.  11. Mild to moderate mitral annular calcification.  12. Trace mitral valve regurgitation.  13. Adequate TR velocity was not obtained to accurately assess RVSP.  14. There is no evidence of pericardial effusion.    PHYSICIAN INTERPRETATION:  Left Ventricle: The left ventricular internal cavity size is normal.   There is severe concentric left ventricular hypertrophy. Global LV   systolic function was normal. Normal segmental left ventricular systolic   function. The left ventricular diastolic function could not be assessed   in this study.  LV apex not well visualized.  Right Ventricle: The right ventricle was not well visualized. RV systolic   function is normal.  Left Atrium: Normal left atrial size.  Right Atrium: Normal right atrial size.  Pericardium: There is no evidence of pericardial effusion.  Mitral Valve: Mild thickening and calcification of the posterior mitral   valve leaflet. There is mild to moderate mitral annular calcification. No   evidence of mitral stenosis. Trace mitral valve regurgitation is seen.  Tricuspid Valve: Structurally normal tricuspid valve, with normal leaflet   excursion. No tricuspid regurgitation is visualized. Adequate TR velocity   was not obtained to accurately assess RVSP.  Aortic Valve: The aortic valve is trileaflet. Mild aortic stenosis is   present. No evidence of aortic valve regurgitation is seen.  Pulmonic Valve: The pulmonic valve was not well visualized. No indication   of pulmonic valve regurgitation.  Aorta: The aortic root and ascending aorta are structurallynormal, with   no evidence of dilitation.  Venous: The inferior vena cava is abnormal. The inferior vena cava was   dilated, with respiratory size variation greater than 50%.      2D AND M-MODE MEASUREMENTS (normal ranges within parentheses):  Left Ventricle:                  Normal   Aorta/Left Atrium:            Normal  IVSd (2D):              1.60 cm (0.7-1.1) Aortic Root (2D):  2.90 cm   (2.4-3.7)  LVPWd (2D):             1.30 cm (0.7-1.1) Left Atrium (2D):  4.60 cm   (1.9-4.0)  LVIDd (2D):          4.80 cm (3.4-5.7)  LVIDs (2D):             3.10 cm  LV FS (2D):             35.4 %   (>25%)  Relative Wall Thickness  0.54    (<0.42)    SPECTRAL DOPPLER ANALYSIS:  LV DIASTOLIC FUNCTION:  MV Peak E: 1.01 m/s Decel Time: 219 msec  MV Peak A: 1.57 m/s  E/A Ratio: 0.64    Aortic Valve:  AoV VMax:    2.41 m/s  AoV Area, Vmax:    1.80 cm² Vmax Indx:    0.81   cm²/m²  AoV VTI:     0.45 m    AoV Area, VTI:     1.98 cm² VTI Indx:     0.90   cm²/m²  AoV Pk Grad: 23.2 mmHg AoV Area, Mn Grad: 1.92 cm² Mn Grad Indx: 0.87   cm²/m²  AoV Mn Grad: 12.0 mmHg    LVOT Vmax: 1.25 m/s  LVOT VTI:  0.26 m  LVOT Diam: 2.10 cm    Mitral Valve:  MV VMax:    1.71 m/s MV P1/2 Time: 63.36 msec  MV Mn Grad: 4.0 mmHg MV Area, PHT: 3.47 cm²    Tricuspid Valve andPA/RV Systolic Pressure: TR Max Velocity:  RA   Pressure: 8 mmHg RVSP/PASP:      [phone] Santiago Tam, Electronically signed on 12/10/2024 at 2:47:13 PM        *** Final ***

## 2024-12-11 NOTE — PROGRESS NOTE ADULT - ASSESSMENT
65 yo female PMHx of COPD (on home O2 5L) CAD s/p stents (on Plavix)  HTN presents to ER c/o shortness of breath x 1 week.     # NSTEMI   # h/o CAD s/p stents      - cardiac monitoring   - trend troponin  +  - echo   - cardiology consult reviewed  - pt anticoagulated in ER  - c/w Plavix      # hypercapnic acidosis respiratory failure   # COPD exacerbation   # URI/cough   - supplemental O2, wean Bipap  - c/w solumedrol   - c/w inhalers   - nebs prn   - IV Rocephin   - pulmonary consult     # h/o HTN  c/w HCTZ    # h/o HLD  c/w statin     # dvt ppx   # gi ppx   # full code

## 2024-12-11 NOTE — PROGRESS NOTE ADULT - SUBJECTIVE AND OBJECTIVE BOX
Subjective/Objective:     64 year old female who presents for sob    MEDICATIONS  (STANDING):  albuterol/ipratropium for Nebulization 3 milliLiter(s) Nebulizer every 6 hours  aspirin  chewable 81 milliGRAM(s) Oral daily  atorvastatin 40 milliGRAM(s) Oral at bedtime  azithromycin  IVPB 500 milliGRAM(s) IV Intermittent every 24 hours  cefTRIAXone   IVPB 1000 milliGRAM(s) IV Intermittent every 24 hours  chlorhexidine 2% Cloths 1 Application(s) Topical <User Schedule>  clopidogrel Tablet 75 milliGRAM(s) Oral daily  fluticasone propionate/ salmeterol 250-50 MICROgram(s) Diskus 1 Dose(s) Inhalation two times a day  hydrochlorothiazide 25 milliGRAM(s) Oral daily  influenza  Vaccine (HIGH DOSE) 0.5 milliLiter(s) IntraMuscular once  methylPREDNISolone sodium succinate Injectable 60 milliGRAM(s) IV Push every 12 hours  pantoprazole    Tablet 40 milliGRAM(s) Oral before breakfast    MEDICATIONS  (PRN):  acetaminophen     Tablet .. 650 milliGRAM(s) Oral every 6 hours PRN Temp greater or equal to 38C (100.4F), Mild Pain (1 - 3)  aluminum hydroxide/magnesium hydroxide/simethicone Suspension 30 milliLiter(s) Oral every 4 hours PRN Dyspepsia  guaifenesin/dextromethorphan Oral Liquid 10 milliLiter(s) Oral four times a day PRN Cough  melatonin 5 milliGRAM(s) Oral at bedtime PRN Insomnia  ondansetron Injectable 4 milliGRAM(s) IV Push every 8 hours PRN Nausea and/or Vomiting          Vital Signs Last 24 Hrs  T(C): 36.5 (11 Dec 2024 04:40), Max: 37.2 (10 Dec 2024 14:26)  T(F): 97.7 (11 Dec 2024 04:40), Max: 98.9 (10 Dec 2024 14:26)  HR: 76 (11 Dec 2024 04:40) (76 - 89)  BP: 136/73 (11 Dec 2024 04:40) (127/86 - 142/69)  BP(mean): 94 (11 Dec 2024 04:40) (94 - 94)  RR: 18 (11 Dec 2024 04:40) (16 - 18)  SpO2: 97% (11 Dec 2024 04:40) (95% - 99%)    Parameters below as of 10 Dec 2024 20:29  Patient On (Oxygen Delivery Method): nasal cannula  O2 Flow (L/min): 4    I&O's Detail    10 Dec 2024 07:01  -  11 Dec 2024 07:00  --------------------------------------------------------  IN:  Total IN: 0 mL    OUT:    Voided (mL): 100 mL  Total OUT: 100 mL    Total NET: -100 mL      GENERAL:  63y/o Female NAD, resting comfortably.  HEAD:  Atraumatic, Normocephalic  EYES: EOMI, PERRLA, conjunctiva and sclera clear  NECK: Supple, No JVD, no cervical lymphadenopathy, non-tender  CHEST/LUNG: + bilateral wheezing  HEART: Regular rate and rhythm; S1&S2  ABDOMEN: Soft, Nontender, Nondistended x 4 quadrants; Bowel sounds present  EXTREMITIES:   Peripheral Pulses Present, No clubbing, no cyanosis, or no edema, no calf tenderness  PSYCH: AAOx3, cooperative, appropriate  NEUROLOGY: WNL  SKIN: WNL        EKG/ TELEM:    LABS:                          11.2   15.10 )-----------( 492      ( 11 Dec 2024 07:36 )             36.9       11 Dec 2024 07:36    142    |  92[L]  |  22[H]  ----------------------------<  187[H]  4.7     |  39[H]  |  0.7    10 Dec 2024 07:09    140    |  93[L]  |  19     ----------------------------<  186[H]  4.7     |  38[H]  |  0.6[L]    Ca    10.0       11 Dec 2024 07:36  Ca    9.7        10 Dec 2024 07:09  Mg     2.5[H]     11 Dec 2024 07:36  Mg     2.4       10 Dec 2024 07:09    TPro  6.4    /  Alb  3.6    /  TBili  0.2    /  DBili  x      /  AST  15     /  ALT  15     /  AlkPhos  95     09 Dec 2024 20:35                      Diagnostic testing:  TTE  < from: TTE Echo Complete w/o Contrast w/ Doppler (12.10.24 @ 12:49) >    Summary:   1. Technically limited study.   2. Normal global left ventricular systolic function.   3. LV Ejection Fraction by Omalley's Method with a biplane EF of 54 %.   4. LV apex not well visualized.   5. Severe concentric left ventricular hypertrophy.   6. The left ventricular diastolic function could not be assessed in this   study.   7. Normal left atrial size.   8. Normal right atrial size.   9. Mild aortic valve stenosis.  10. Mild thickening and calcification of the posterior mitral valve   leaflet.  11. Mild to moderate mitral annular calcification.  12. Trace mitral valve regurgitation.  13. Adequate TR velocity was not obtained to accurately assess RVSP.  14. There is no evidence of pericardial effusion.    < end of copied text >        CATH  < from: Cardiac Catheterization (03.24.22 @ 14:12) >  Conclusions:         - Successful PCI of mRCA recanalized  with 3.0x38mm NATHANAEL followed by    post dilation with 3.0mm NC balloon, there was a    jailed acute marginal branch that was pinched and lost flow, which was    ballooned to restore KIZZY 3 flow    - Patent recently placed LAD stents         Recommendations:         - Continue DAPT, statins and other medical therapies for CAD     - Risk factors modifications for cAD     - Follow up with  Dr Robi Goel in 1 week post discharge      < end of copied text >  < from: 12 Lead ECG (12.09.24 @ 19:37) >  Ventricular Rate 87 BPM    Atrial Rate 87 BPM    P-R Interval 190 ms    QRS Duration 106 ms    Q-T Interval 402 ms    QTC Calculation(Bazett) 483 ms    P Axis 60 degrees    R Axis -48 degrees    T Axis 69 degrees    Diagnosis Line Sinus rhythm withFusion complexes  Left axis deviation  Incomplete right bundle branch block  Inferior infarct , age undetermined  Anteroseptal infarct , age undetermined  Abnormal ECG    Confirmed by PAMELA VALDERRAMA MD ([id number]) on 12/10/2024 6:58:31 AM    < end of copied text >              Assessment and Plan:

## 2024-12-12 LAB
ANION GAP SERPL CALC-SCNC: 9 MMOL/L — SIGNIFICANT CHANGE UP (ref 7–14)
BASOPHILS # BLD AUTO: 0 K/UL — SIGNIFICANT CHANGE UP (ref 0–0.2)
BASOPHILS NFR BLD AUTO: 0 % — SIGNIFICANT CHANGE UP (ref 0–1)
BUN SERPL-MCNC: 26 MG/DL — HIGH (ref 10–20)
CALCIUM SERPL-MCNC: 9.5 MG/DL — SIGNIFICANT CHANGE UP (ref 8.4–10.5)
CHLORIDE SERPL-SCNC: 91 MMOL/L — LOW (ref 98–110)
CO2 SERPL-SCNC: 39 MMOL/L — HIGH (ref 17–32)
CREAT SERPL-MCNC: 0.7 MG/DL — SIGNIFICANT CHANGE UP (ref 0.7–1.5)
EGFR: 95 ML/MIN/1.73M2 — SIGNIFICANT CHANGE UP
EOSINOPHIL NFR BLD AUTO: 0 % — SIGNIFICANT CHANGE UP (ref 0–8)
GLUCOSE SERPL-MCNC: 218 MG/DL — HIGH (ref 70–99)
HCT VFR BLD CALC: 36.1 % — LOW (ref 37–47)
HGB BLD-MCNC: 10.9 G/DL — LOW (ref 12–16)
HYPOCHROMIA BLD QL: SLIGHT — SIGNIFICANT CHANGE UP
LYMPHOCYTES # BLD AUTO: 0.63 K/UL — LOW (ref 1.2–3.4)
LYMPHOCYTES # BLD AUTO: 4 % — LOW (ref 20.5–51.1)
MACROCYTES BLD QL: SLIGHT — SIGNIFICANT CHANGE UP
MANUAL SMEAR VERIFICATION: SIGNIFICANT CHANGE UP
MCHC RBC-ENTMCNC: 27.1 PG — SIGNIFICANT CHANGE UP (ref 27–31)
MCHC RBC-ENTMCNC: 30.2 G/DL — LOW (ref 32–37)
MCV RBC AUTO: 89.8 FL — SIGNIFICANT CHANGE UP (ref 81–99)
MONOCYTES # BLD AUTO: 1.26 K/UL — HIGH (ref 0.1–0.6)
MONOCYTES NFR BLD AUTO: 8 % — SIGNIFICANT CHANGE UP (ref 1.7–9.3)
NEUTROPHILS # BLD AUTO: 13.85 K/UL — HIGH (ref 1.4–6.5)
NEUTROPHILS NFR BLD AUTO: 87 % — HIGH (ref 42.2–75.2)
NEUTS BAND # BLD: 1 % — SIGNIFICANT CHANGE UP (ref 0–6)
NRBC # BLD: 0 /100 WBCS — SIGNIFICANT CHANGE UP (ref 0–0)
NRBC # BLD: SIGNIFICANT CHANGE UP /100 WBCS (ref 0–0)
PLAT MORPH BLD: NORMAL — SIGNIFICANT CHANGE UP
PLATELET # BLD AUTO: 453 K/UL — HIGH (ref 130–400)
PLATELET COUNT - ESTIMATE: NORMAL — SIGNIFICANT CHANGE UP
PMV BLD: 9.6 FL — SIGNIFICANT CHANGE UP (ref 7.4–10.4)
POTASSIUM SERPL-MCNC: 4.8 MMOL/L — SIGNIFICANT CHANGE UP (ref 3.5–5)
POTASSIUM SERPL-SCNC: 4.8 MMOL/L — SIGNIFICANT CHANGE UP (ref 3.5–5)
RBC # BLD: 4.02 M/UL — LOW (ref 4.2–5.4)
RBC # FLD: 13.3 % — SIGNIFICANT CHANGE UP (ref 11.5–14.5)
RBC BLD AUTO: NORMAL — SIGNIFICANT CHANGE UP
SODIUM SERPL-SCNC: 139 MMOL/L — SIGNIFICANT CHANGE UP (ref 135–146)
WBC # BLD: 15.74 K/UL — HIGH (ref 4.8–10.8)
WBC # FLD AUTO: 15.74 K/UL — HIGH (ref 4.8–10.8)

## 2024-12-12 PROCEDURE — 99233 SBSQ HOSP IP/OBS HIGH 50: CPT

## 2024-12-12 RX ORDER — FLUTICASONE PROPIONATE AND SALMETEROL XINAFOATE 45; 21 UG/1; UG/1
1 AEROSOL, METERED RESPIRATORY (INHALATION)
Refills: 0 | Status: DISCONTINUED | OUTPATIENT
Start: 2024-12-12 | End: 2024-12-21

## 2024-12-12 RX ORDER — AZITHROMYCIN 250 MG/1
500 TABLET, FILM COATED ORAL EVERY 24 HOURS
Refills: 0 | Status: COMPLETED | OUTPATIENT
Start: 2024-12-13 | End: 2024-12-14

## 2024-12-12 RX ADMIN — AZITHROMYCIN 255 MILLIGRAM(S): 250 TABLET, FILM COATED ORAL at 10:58

## 2024-12-12 RX ADMIN — IPRATROPIUM BROMIDE AND ALBUTEROL SULFATE 3 MILLILITER(S): 2.5; .5 SOLUTION RESPIRATORY (INHALATION) at 20:05

## 2024-12-12 RX ADMIN — Medication 60 MILLIGRAM(S): at 05:12

## 2024-12-12 RX ADMIN — CHLORHEXIDINE GLUCONATE 1 APPLICATION(S): 1.2 RINSE ORAL at 06:20

## 2024-12-12 RX ADMIN — FLUTICASONE PROPIONATE AND SALMETEROL XINAFOATE 1 DOSE(S): 45; 21 AEROSOL, METERED RESPIRATORY (INHALATION) at 10:53

## 2024-12-12 RX ADMIN — ACETAMINOPHEN 500MG 650 MILLIGRAM(S): 500 TABLET, COATED ORAL at 16:00

## 2024-12-12 RX ADMIN — Medication 81 MILLIGRAM(S): at 11:07

## 2024-12-12 RX ADMIN — IPRATROPIUM BROMIDE AND ALBUTEROL SULFATE 3 MILLILITER(S): 2.5; .5 SOLUTION RESPIRATORY (INHALATION) at 07:43

## 2024-12-12 RX ADMIN — ACETAMINOPHEN, DIPHENHYDRAMINE HCL, PHENYLEPHRINE HCL 5 MILLIGRAM(S): 325; 25; 5 TABLET ORAL at 00:21

## 2024-12-12 RX ADMIN — Medication 40 MILLIGRAM(S): at 21:18

## 2024-12-12 RX ADMIN — CLOPIDOGREL 75 MILLIGRAM(S): 75 TABLET, FILM COATED ORAL at 11:06

## 2024-12-12 RX ADMIN — IPRATROPIUM BROMIDE AND ALBUTEROL SULFATE 3 MILLILITER(S): 2.5; .5 SOLUTION RESPIRATORY (INHALATION) at 14:47

## 2024-12-12 RX ADMIN — Medication 100 MILLIGRAM(S): at 05:13

## 2024-12-12 RX ADMIN — ACETAMINOPHEN 500MG 650 MILLIGRAM(S): 500 TABLET, COATED ORAL at 14:49

## 2024-12-12 RX ADMIN — Medication 60 MILLIGRAM(S): at 17:28

## 2024-12-12 RX ADMIN — ACETAMINOPHEN 500MG 650 MILLIGRAM(S): 500 TABLET, COATED ORAL at 00:20

## 2024-12-12 RX ADMIN — Medication 10 MILLILITER(S): at 04:13

## 2024-12-12 RX ADMIN — Medication 10 MILLILITER(S): at 11:22

## 2024-12-12 NOTE — PROGRESS NOTE ADULT - SUBJECTIVE AND OBJECTIVE BOX
Patient is a 64y old  Female who presents with a chief complaint of     Over Night Events:  Patient seen and examined.   feel better bit still has wheeze and cough   cxr reviewed     ROS:  See HPI    PHYSICAL EXAM    ICU Vital Signs Last 24 Hrs  T(C): 36.7 (12 Dec 2024 05:00), Max: 36.8 (11 Dec 2024 13:41)  T(F): 98.1 (12 Dec 2024 05:00), Max: 98.3 (11 Dec 2024 13:41)  HR: 62 (12 Dec 2024 05:00) (62 - 86)  BP: 150/71 (12 Dec 2024 05:00) (133/80 - 150/71)  BP(mean): --  ABP: --  ABP(mean): --  RR: 18 (12 Dec 2024 05:00) (18 - 18)  SpO2: 98% (12 Dec 2024 05:00) (96% - 98%)    O2 Parameters below as of 11 Dec 2024 13:41  Patient On (Oxygen Delivery Method): nasal cannula  O2 Flow (L/min): 4          General: awake   HEENT:                Lymph Nodes: NO cervical LN   Lungs: Bilateral  wheezing   Cardiovascular: Regular   Abdomen: Soft, Positive BS  Extremities: No clubbing   Skin: warm   Neurological:   Musculoskeletal: move all ext     I&O's Detail      LABS:                          10.9   15.74 )-----------( 453      ( 12 Dec 2024 06:23 )             36.1         12 Dec 2024 06:23    139    |  91     |  26     ----------------------------<  218    4.8     |  39     |  0.7      Ca    9.5        12 Dec 2024 06:23  Mg     2.5       11 Dec 2024 07:36                                                                                      Urinalysis Basic - ( 12 Dec 2024 06:23 )    Color: x / Appearance: x / SG: x / pH: x  Gluc: 218 mg/dL / Ketone: x  / Bili: x / Urobili: x   Blood: x / Protein: x / Nitrite: x   Leuk Esterase: x / RBC: x / WBC x   Sq Epi: x / Non Sq Epi: x / Bacteria: x                                                                Culture - Blood (collected 09 Dec 2024 20:35)  Source: .Blood BLOOD  Preliminary Report (12 Dec 2024 04:01):    No growth at 48 Hours    Culture - Blood (collected 09 Dec 2024 20:35)  Source: .Blood BLOOD  Preliminary Report (12 Dec 2024 04:01):    No growth at 48 Hours                                                                                           MEDICATIONS  (STANDING):  albuterol/ipratropium for Nebulization 3 milliLiter(s) Nebulizer every 6 hours  aspirin  chewable 81 milliGRAM(s) Oral daily  atorvastatin 40 milliGRAM(s) Oral at bedtime  azithromycin  IVPB 500 milliGRAM(s) IV Intermittent every 24 hours  cefTRIAXone   IVPB 1000 milliGRAM(s) IV Intermittent every 24 hours  chlorhexidine 2% Cloths 1 Application(s) Topical <User Schedule>  clopidogrel Tablet 75 milliGRAM(s) Oral daily  fluticasone propionate/ salmeterol 250-50 MICROgram(s) Diskus 1 Dose(s) Inhalation two times a day  hydrochlorothiazide 25 milliGRAM(s) Oral daily  influenza  Vaccine (HIGH DOSE) 0.5 milliLiter(s) IntraMuscular once  methylPREDNISolone sodium succinate Injectable 60 milliGRAM(s) IV Push every 12 hours  pantoprazole    Tablet 40 milliGRAM(s) Oral before breakfast    MEDICATIONS  (PRN):  acetaminophen     Tablet .. 650 milliGRAM(s) Oral every 6 hours PRN Temp greater or equal to 38C (100.4F), Mild Pain (1 - 3)  aluminum hydroxide/magnesium hydroxide/simethicone Suspension 30 milliLiter(s) Oral every 4 hours PRN Dyspepsia  guaifenesin/dextromethorphan Oral Liquid 10 milliLiter(s) Oral four times a day PRN Cough  melatonin 5 milliGRAM(s) Oral at bedtime PRN Insomnia  ondansetron Injectable 4 milliGRAM(s) IV Push every 8 hours PRN Nausea and/or Vomiting          Xrays:  decrease rll opacity                                                                                   ECHO:  [facility] ICU:

## 2024-12-12 NOTE — PROGRESS NOTE ADULT - ASSESSMENT
IMPRESSION:  copd exacerbation   possible PNA   acute and chronic  hypercapnia resp failure secondary to copd   possible OHS/delaney   ?? NSTEMI     PLAN:   need outpatient ct scan screening patient informed     azithromycin  and  Rocephine total 5 days   solumedrol 60 mg Q12 hrs   start symbicort Q12 hrs   add spiriva upon d/c   NIV at night and repeat ABG luis enrique am while on NIV   social service to arrange for NIV   keep pox >92%   case discussed with cardiology and dr zaragoza cardiac cath will be postponed for next week because of the wheezing and cough as per cardiology it is ok

## 2024-12-12 NOTE — PHARMACOTHERAPY INTERVENTION NOTE - COMMENTS
Recommended to change intravenous azithromycin to oral since patient is tolerating oral medications.    Manjeet Sosa, PharmD, [facility]  Clinical Pharmacy Specialist, Infectious Diseases  Tele-Antimicrobial Stewardship Program (Tele-ASP)  Tele-ASP Phone: [phone]

## 2024-12-12 NOTE — PROGRESS NOTE ADULT - SUBJECTIVE AND OBJECTIVE BOX
63 yo female PMHx of COPD (on home O2 5L) CAD s/p stents (on Plavix)  HTN  shortness     PAST MEDICAL & SURGICAL HISTORY:    HTN (hypertension)  Chronic obstructive pulmonary disease (COPD)  Hyperlipidemia  CAD (coronary artery disease)  S/P cholecystectomy  H/O tear of meniscus of knee joint    Social History:  smoker x 25 years, quit 3 years ago (09 Dec 2024 23:00)    MEDICATIONS  (STANDING):  albuterol/ipratropium for Nebulization 3 milliLiter(s) Nebulizer every 6 hours  aspirin  chewable 81 milliGRAM(s) Oral daily  atorvastatin 40 milliGRAM(s) Oral at bedtime  azithromycin  IVPB 500 milliGRAM(s) IV Intermittent every 24 hours  cefTRIAXone   IVPB 1000 milliGRAM(s) IV Intermittent every 24 hours  chlorhexidine 2% Cloths 1 Application(s) Topical <User Schedule>  clopidogrel Tablet 75 milliGRAM(s) Oral daily  fluticasone propionate/ salmeterol 250-50 MICROgram(s) Diskus 1 Dose(s) Inhalation two times a day  hydrochlorothiazide 25 milliGRAM(s) Oral daily  influenza  Vaccine (HIGH DOSE) 0.5 milliLiter(s) IntraMuscular once  methylPREDNISolone sodium succinate Injectable 60 milliGRAM(s) IV Push every 12 hours  pantoprazole    Tablet 40 milliGRAM(s) Oral before breakfast    MEDICATIONS  (PRN):  acetaminophen     Tablet .. 650 milliGRAM(s) Oral every 6 hours PRN Temp greater or equal to 38C (100.4F), Mild Pain (1 - 3)  aluminum hydroxide/magnesium hydroxide/simethicone Suspension 30 milliLiter(s) Oral every 4 hours PRN Dyspepsia  melatonin 5 milliGRAM(s) Oral at bedtime PRN Insomnia  ondansetron Injectable 4 milliGRAM(s) IV Push every 8 hours PRN Nausea and/or Vomiting    Allergies    No Known Allergies    Intolerances    Vital Signs Last 24 Hrs  T(C): 36.7 (12 Dec 2024 05:00), Max: 36.8 (11 Dec 2024 13:41)  T(F): 98.1 (12 Dec 2024 05:00), Max: 98.3 (11 Dec 2024 13:41)  HR: 62 (12 Dec 2024 05:00) (62 - 86)  BP: 150/71 (12 Dec 2024 05:00) (133/80 - 150/71)  BP(mean): --  RR: 18 (12 Dec 2024 05:00) (18 - 18)  SpO2: 98% (12 Dec 2024 05:00) (96% - 98%)    Parameters below as of 11 Dec 2024 13:41  Patient On (Oxygen Delivery Method): nasal cannula  O2 Flow (L/min): 4      Constitutional: NAD   HEENT: Airway patent, moist MM, no erythema/swelling/deformity of oral structures. EOMI, PERRLA.  CV: regular rate, regular rhythm, well-perfused extremities, 2+ b/l DP and radial pulses equal.  Lungs: + wheeze,  no rales, rhonchi   ABD: NTND, no guarding or rebound, no pulsatile mass, no hernias.   MSK: Neck supple, nontender, nl ROM, no stepoff. Chest nontender. Back nontender in TLS spine or to b/l bony structures or flanks. Ext nontender, nl rom, no deformity.   INTEG: Skin warm, dry, no rash.  NEURO: A&Ox3, normal strength, nl sensation throughout, normal speech.   PSYCH: Denies SI/HI/hallucinations    LAB:                           10.9   15.74 )-----------( 453      ( 12 Dec 2024 06:23 )             36.1                         10.9   12.59 )-----------( 390      ( 10 Dec 2024 07:09 )             37.2     12-11    142  |  92[L]  |  22[H]  ----------------------------<  187[H]  4.7   |  39[H]  |  0.7    Ca    10.0      11 Dec 2024 07:36  Mg     2.5     12-11        12-10    140  |  93[L]  |  19  ----------------------------<  186[H]  4.7   |  38[H]  |  0.6[L]    Ca    9.7      10 Dec 2024 07:09  Mg     2.4     12-10    TPro  6.4  /  Alb  3.6  /  TBili  0.2  /  DBili  x   /  AST  15  /  ALT  15  /  AlkPhos  95  12-09    Troponin T, High Sensitivity Result: 102    RAD:    ACC: [id number] EXAM:  XR CHEST PORTABLE URGENT 1V   ORDERED BY: SHELTON QUINN     PROCEDURE DATE:  12/09/2024          INTERPRETATION:  CLINICAL HISTORY: Shortness of breath    COMPARISON: 4/7/2005.    TECHNIQUE: Frontal    FINDINGS:    Cardiac/mediastinum/hilum: Heart is enlarged    Lung parenchyma/Pleura: Right-sided lung opacities    Skeleton/soft tissues: Degenerative change..      IMPRESSION:    Right-sided lung opacities    --- End of Report ---            HIREN SILVA MD; Attending Radiologist  This document has been electronically signed. Dec 10 2024 11:50AM      ACC: [id number] EXAM:  ECHO TTE WO CON COMP W DOPP                          PROCEDURE DATE:  12/10/2024          INTERPRETATION:   [address] [address]                Phone: [phone].   TRANSTHORACIC ECHOCARDIOGRAM REPORT        Patient Name:   HARIS TO Accession #: [id number]  Medical Rec #:  [id number]    Height:      62.0 in 157.5 cm  YOB: 1960   Weight:      281.0 lb 127.46 kg  Patient Age:    64 years    BSA:         2.21 m²  Patient Gender: F           BP:          105/69 mmHg      Date of Exam:        12/10/2024 12:49:44 PM  Referring Physician: Zeynep Mccarthy  Sonographer:         Nancy Scales  Reading Physician:   Santiago Tam.    Procedure:     2D Echo/Doppler/Color Doppler Complete.  Indications:   R06.00 - Dyspnea, unspecified  Diagnosis:     R06.00 - Dyspnea, unspecified  Study Details: Technically limited study.        Summary:   1. Technically limited study.   2. Normal global left ventricular systolic function.   3. LV Ejection Fraction by Omalley's Method with a biplane EF of 54 %.   4. LV apex not well visualized.   5. Severe concentric left ventricular hypertrophy.   6. The left ventricular diastolic function could not be assessed in this   study.   7. Normal left atrial size.   8. Normal right atrial size.   9. Mild aortic valve stenosis.  10. Mild thickening and calcification of the posterior mitral valve   leaflet.  11. Mild to moderate mitral annular calcification.  12. Trace mitral valve regurgitation.  13. Adequate TR velocity was not obtained to accurately assess RVSP.  14. There is no evidence of pericardial effusion.    PHYSICIAN INTERPRETATION:  Left Ventricle: The left ventricular internal cavity size is normal.   There is severe concentric left ventricular hypertrophy. Global LV   systolic function was normal. Normal segmental left ventricular systolic   function. The left ventricular diastolic function could not be assessed   in this study.  LV apex not well visualized.  Right Ventricle: The right ventricle was not well visualized. RV systolic   function is normal.  Left Atrium: Normal left atrial size.  Right Atrium: Normal right atrial size.  Pericardium: There is no evidence of pericardial effusion.  Mitral Valve: Mild thickening and calcification of the posterior mitral   valve leaflet. There is mild to moderate mitral annular calcification. No   evidence of mitral stenosis. Trace mitral valve regurgitation is seen.  Tricuspid Valve: Structurally normal tricuspid valve, with normal leaflet   excursion. No tricuspid regurgitation is visualized. Adequate TR velocity   was not obtained to accurately assess RVSP.  Aortic Valve: The aortic valve is trileaflet. Mild aortic stenosis is   present. No evidence of aortic valve regurgitation is seen.  Pulmonic Valve: The pulmonic valve was not well visualized. No indication   of pulmonic valve regurgitation.  Aorta: The aortic root and ascending aorta are structurallynormal, with   no evidence of dilitation.  Venous: The inferior vena cava is abnormal. The inferior vena cava was   dilated, with respiratory size variation greater than 50%.      2D AND M-MODE MEASUREMENTS (normal ranges within parentheses):  Left Ventricle:                  Normal   Aorta/Left Atrium:            Normal  IVSd (2D):              1.60 cm (0.7-1.1) Aortic Root (2D):  2.90 cm   (2.4-3.7)  LVPWd (2D):             1.30 cm (0.7-1.1) Left Atrium (2D):  4.60 cm   (1.9-4.0)  LVIDd (2D):          4.80 cm (3.4-5.7)  LVIDs (2D):             3.10 cm  LV FS (2D):             35.4 %   (>25%)  Relative Wall Thickness  0.54    (<0.42)    SPECTRAL DOPPLER ANALYSIS:  LV DIASTOLIC FUNCTION:  MV Peak E: 1.01 m/s Decel Time: 219 msec  MV Peak A: 1.57 m/s  E/A Ratio: 0.64    Aortic Valve:  AoV VMax:    2.41 m/s  AoV Area, Vmax:    1.80 cm² Vmax Indx:    0.81   cm²/m²  AoV VTI:     0.45 m    AoV Area, VTI:     1.98 cm² VTI Indx:     0.90   cm²/m²  AoV Pk Grad: 23.2 mmHg AoV Area, Mn Grad: 1.92 cm² Mn Grad Indx: 0.87   cm²/m²  AoV Mn Grad: 12.0 mmHg    LVOT Vmax: 1.25 m/s  LVOT VTI:  0.26 m  LVOT Diam: 2.10 cm    Mitral Valve:  MV VMax:    1.71 m/s MV P1/2 Time: 63.36 msec  MV Mn Grad: 4.0 mmHg MV Area, PHT: 3.47 cm²    Tricuspid Valve andPA/RV Systolic Pressure: TR Max Velocity:  RA   Pressure: 8 mmHg RVSP/PASP:      [id number] Santiago Tam, Electronically signed on 12/10/2024 at 2:47:13 PM        *** Final ***

## 2024-12-13 LAB
BASE EXCESS BLDA CALC-SCNC: 16.6 MMOL/L — HIGH (ref -2–3)
HCO3 BLDA-SCNC: 44 MMOL/L — HIGH (ref 21–28)
PCO2 BLDA: 67 MMHG — HIGH (ref 32–45)
PH BLDA: 7.43 — SIGNIFICANT CHANGE UP (ref 7.35–7.45)
PO2 BLDA: 164 MMHG — HIGH (ref 83–108)
SAO2 % BLDA: 99.8 % — HIGH (ref 94–98)

## 2024-12-13 PROCEDURE — 99232 SBSQ HOSP IP/OBS MODERATE 35: CPT

## 2024-12-13 RX ADMIN — Medication 100 MILLIGRAM(S): at 05:27

## 2024-12-13 RX ADMIN — Medication 60 MILLIGRAM(S): at 17:18

## 2024-12-13 RX ADMIN — ACETAMINOPHEN 500MG 650 MILLIGRAM(S): 500 TABLET, COATED ORAL at 21:23

## 2024-12-13 RX ADMIN — IPRATROPIUM BROMIDE AND ALBUTEROL SULFATE 3 MILLILITER(S): 2.5; .5 SOLUTION RESPIRATORY (INHALATION) at 20:21

## 2024-12-13 RX ADMIN — ACETAMINOPHEN 500MG 650 MILLIGRAM(S): 500 TABLET, COATED ORAL at 12:00

## 2024-12-13 RX ADMIN — FLUTICASONE PROPIONATE AND SALMETEROL XINAFOATE 1 DOSE(S): 45; 21 AEROSOL, METERED RESPIRATORY (INHALATION) at 08:44

## 2024-12-13 RX ADMIN — PANTOPRAZOLE SODIUM 40 MILLIGRAM(S): 40 TABLET, DELAYED RELEASE ORAL at 05:27

## 2024-12-13 RX ADMIN — CLOPIDOGREL 75 MILLIGRAM(S): 75 TABLET, FILM COATED ORAL at 12:03

## 2024-12-13 RX ADMIN — Medication 60 MILLIGRAM(S): at 05:27

## 2024-12-13 RX ADMIN — Medication 81 MILLIGRAM(S): at 12:03

## 2024-12-13 RX ADMIN — ACETAMINOPHEN, DIPHENHYDRAMINE HCL, PHENYLEPHRINE HCL 5 MILLIGRAM(S): 325; 25; 5 TABLET ORAL at 21:23

## 2024-12-13 RX ADMIN — IPRATROPIUM BROMIDE AND ALBUTEROL SULFATE 3 MILLILITER(S): 2.5; .5 SOLUTION RESPIRATORY (INHALATION) at 15:20

## 2024-12-13 RX ADMIN — ACETAMINOPHEN 500MG 650 MILLIGRAM(S): 500 TABLET, COATED ORAL at 21:50

## 2024-12-13 RX ADMIN — ACETAMINOPHEN 500MG 650 MILLIGRAM(S): 500 TABLET, COATED ORAL at 11:00

## 2024-12-13 RX ADMIN — AZITHROMYCIN 500 MILLIGRAM(S): 250 TABLET, FILM COATED ORAL at 11:00

## 2024-12-13 RX ADMIN — CHLORHEXIDINE GLUCONATE 1 APPLICATION(S): 1.2 RINSE ORAL at 05:26

## 2024-12-13 RX ADMIN — Medication 40 MILLIGRAM(S): at 21:23

## 2024-12-13 RX ADMIN — IPRATROPIUM BROMIDE AND ALBUTEROL SULFATE 3 MILLILITER(S): 2.5; .5 SOLUTION RESPIRATORY (INHALATION) at 08:40

## 2024-12-13 RX ADMIN — Medication 10 MILLILITER(S): at 13:01

## 2024-12-13 NOTE — PROGRESS NOTE ADULT - SUBJECTIVE AND OBJECTIVE BOX
Patient is a 64y old  Female who presents with a chief complaint of     Over Night Events:  Patient seen and examined.   feel better , wheezing improved   ABG reviewed     ROS:  See HPI    PHYSICAL EXAM    ICU Vital Signs Last 24 Hrs  T(C): 36.4 (13 Dec 2024 04:01), Max: 36.6 (12 Dec 2024 20:53)  T(F): 97.5 (13 Dec 2024 04:01), Max: 97.9 (12 Dec 2024 20:53)  HR: 68 (13 Dec 2024 04:01) (68 - 84)  BP: 145/64 (13 Dec 2024 04:01) (145/64 - 153/70)  BP(mean): --  ABP: --  ABP(mean): --  RR: 18 (13 Dec 2024 07:52) (18 - 18)  SpO2: 98% (13 Dec 2024 07:52) (98% - 99%)    O2 Parameters below as of 13 Dec 2024 07:52  Patient On (Oxygen Delivery Method): nasal cannula  O2 Flow (L/min): 5          General:awake   HEENT:       no focal          Lymph Nodes: NO cervical LN   Lungs: Bilateral wheezing mild   Cardiovascular: Regular   Abdomen: Soft, Positive BS  Extremities: No clubbing   Skin: warm   Neurological:   Musculoskeletal: move all ext     I&O's Detail      LABS:                          10.9   15.74 )-----------( 453      ( 12 Dec 2024 06:23 )             36.1         12 Dec 2024 06:23    139    |  91     |  26     ----------------------------<  218    4.8     |  39     |  0.7      Ca    9.5        12 Dec 2024 06:23                                                                                      Urinalysis Basic - ( 12 Dec 2024 06:23 )    Color: x / Appearance: x / SG: x / pH: x  Gluc: 218 mg/dL / Ketone: x  / Bili: x / Urobili: x   Blood: x / Protein: x / Nitrite: x   Leuk Esterase: x / RBC: x / WBC x   Sq Epi: x / Non Sq Epi: x / Bacteria: x                                                                                                                                                 ABG - ( 13 Dec 2024 04:30 )  pH, Arterial: 7.43  pH, Blood: x     /  pCO2: 67    /  pO2: 164   / HCO3: 44    / Base Excess: 16.6  /  SaO2: 99.8                MEDICATIONS  (STANDING):  albuterol/ipratropium for Nebulization 3 milliLiter(s) Nebulizer every 6 hours  aspirin  chewable 81 milliGRAM(s) Oral daily  atorvastatin 40 milliGRAM(s) Oral at bedtime  azithromycin   Tablet 500 milliGRAM(s) Oral every 24 hours  cefTRIAXone   IVPB 1000 milliGRAM(s) IV Intermittent every 24 hours  chlorhexidine 2% Cloths 1 Application(s) Topical <User Schedule>  clopidogrel Tablet 75 milliGRAM(s) Oral daily  fluticasone propionate/ salmeterol 250-50 MICROgram(s) Diskus 1 Dose(s) Inhalation two times a day  hydrochlorothiazide 25 milliGRAM(s) Oral daily  influenza  Vaccine (HIGH DOSE) 0.5 milliLiter(s) IntraMuscular once  methylPREDNISolone sodium succinate Injectable 60 milliGRAM(s) IV Push every 12 hours  pantoprazole    Tablet 40 milliGRAM(s) Oral before breakfast    MEDICATIONS  (PRN):  acetaminophen     Tablet .. 650 milliGRAM(s) Oral every 6 hours PRN Temp greater or equal to 38C (100.4F), Mild Pain (1 - 3)  aluminum hydroxide/magnesium hydroxide/simethicone Suspension 30 milliLiter(s) Oral every 4 hours PRN Dyspepsia  guaifenesin/dextromethorphan Oral Liquid 10 milliLiter(s) Oral four times a day PRN Cough  melatonin 5 milliGRAM(s) Oral at bedtime PRN Insomnia  ondansetron Injectable 4 milliGRAM(s) IV Push every 8 hours PRN Nausea and/or Vomiting          Xrays:  TLC:  OG:  ET tube:                                                                                       ECHO:  [facility] ICU:

## 2024-12-13 NOTE — PROGRESS NOTE ADULT - ASSESSMENT
IMPRESSION:  copd exacerbation   possible PNA   acute and chronic  hypercapnia resp failure secondary to copd   possible OHS/delaney   ?? NSTEMI     PLAN:   need outpatient ct scan screening patient informed     azithromycin  and  Rocephine total 5 days   solumedrol 60 mg Q12 hrs consider switch to prednisone 60 mg in 48 hrs and taper   on advair   add spiriva upon d/c   change bipap to 16/8   social service to arrange for NIV bipap at home   keep pox >92%   case discussed with cardiology and dr zaragoza cardiac cath will be postponed for next week because of the wheezing and cough as per cardiology it is ok

## 2024-12-13 NOTE — PROGRESS NOTE ADULT - TIME BILLING
case discussed with hospitalist and cardiology   for coordination chart review
case discussed with hospitalist and cardiology   for coordination chart review

## 2024-12-13 NOTE — PROGRESS NOTE ADULT - SUBJECTIVE AND OBJECTIVE BOX
63 yo female PMHx of COPD (on home O2 5L) CAD s/p stents (on Plavix)  HTN  shortness     PAST MEDICAL & SURGICAL HISTORY:    HTN (hypertension)  Chronic obstructive pulmonary disease (COPD)  Hyperlipidemia  CAD (coronary artery disease)  S/P cholecystectomy  H/O tear of meniscus of knee joint    Social History:  smoker x 25 years, quit 3 years ago (09 Dec 2024 23:00)    MEDICATIONS  (STANDING):  albuterol/ipratropium for Nebulization 3 milliLiter(s) Nebulizer every 6 hours  aspirin  chewable 81 milliGRAM(s) Oral daily  atorvastatin 40 milliGRAM(s) Oral at bedtime  azithromycin  IVPB 500 milliGRAM(s) IV Intermittent every 24 hours  cefTRIAXone   IVPB 1000 milliGRAM(s) IV Intermittent every 24 hours  chlorhexidine 2% Cloths 1 Application(s) Topical <User Schedule>  clopidogrel Tablet 75 milliGRAM(s) Oral daily  fluticasone propionate/ salmeterol 250-50 MICROgram(s) Diskus 1 Dose(s) Inhalation two times a day  hydrochlorothiazide 25 milliGRAM(s) Oral daily  influenza  Vaccine (HIGH DOSE) 0.5 milliLiter(s) IntraMuscular once  methylPREDNISolone sodium succinate Injectable 60 milliGRAM(s) IV Push every 12 hours  pantoprazole    Tablet 40 milliGRAM(s) Oral before breakfast    MEDICATIONS  (PRN):  acetaminophen     Tablet .. 650 milliGRAM(s) Oral every 6 hours PRN Temp greater or equal to 38C (100.4F), Mild Pain (1 - 3)  aluminum hydroxide/magnesium hydroxide/simethicone Suspension 30 milliLiter(s) Oral every 4 hours PRN Dyspepsia  melatonin 5 milliGRAM(s) Oral at bedtime PRN Insomnia  ondansetron Injectable 4 milliGRAM(s) IV Push every 8 hours PRN Nausea and/or Vomiting    Allergies    No Known Allergies    Intolerances    Vital Signs Last 24 Hrs  T(C): 36.4 (13 Dec 2024 04:01), Max: 36.6 (12 Dec 2024 20:53)  T(F): 97.5 (13 Dec 2024 04:01), Max: 97.9 (12 Dec 2024 20:53)  HR: 68 (13 Dec 2024 04:01) (68 - 84)  BP: 145/64 (13 Dec 2024 04:01) (145/64 - 153/70)  BP(mean): --  RR: 18 (13 Dec 2024 04:01) (18 - 18)  SpO2: 99% (13 Dec 2024 04:01) (98% - 99%)      Constitutional: NAD   HEENT: Airway patent, moist MM, no erythema/swelling/deformity of oral structures. EOMI, PERRLA.  CV: regular rate, regular rhythm, well-perfused extremities, 2+ b/l DP and radial pulses equal.  Lungs: + wheeze,  no rales, rhonchi   ABD: NTND, no guarding or rebound, no pulsatile mass, no hernias.   MSK: Neck supple, nontender, nl ROM, no stepoff. Chest nontender. Back nontender in TLS spine or to b/l bony structures or flanks. Ext nontender, nl rom, no deformity.   INTEG: Skin warm, dry, no rash.  NEURO: A&Ox3, normal strength, nl sensation throughout, normal speech.   PSYCH: Denies SI/HI/hallucinations    LAB:                           10.9   15.74 )-----------( 453      ( 12 Dec 2024 06:23 )             36.1                         10.9   12.59 )-----------( 390      ( 10 Dec 2024 07:09 )             37.2     12-11    142  |  92[L]  |  22[H]  ----------------------------<  187[H]  4.7   |  39[H]  |  0.7    Ca    10.0      11 Dec 2024 07:36  Mg     2.5     12-11        12-10    140  |  93[L]  |  19  ----------------------------<  186[H]  4.7   |  38[H]  |  0.6[L]    Ca    9.7      10 Dec 2024 07:09  Mg     2.4     12-10    TPro  6.4  /  Alb  3.6  /  TBili  0.2  /  DBili  x   /  AST  15  /  ALT  15  /  AlkPhos  95  12-09    Troponin T, High Sensitivity Result: 102    RAD:    ACC: [id number] EXAM:  XR CHEST PORTABLE URGENT 1V   ORDERED BY: SHELTON QUINN     PROCEDURE DATE:  12/09/2024          INTERPRETATION:  CLINICAL HISTORY: Shortness of breath    COMPARISON: 4/7/2005.    TECHNIQUE: Frontal    FINDINGS:    Cardiac/mediastinum/hilum: Heart is enlarged    Lung parenchyma/Pleura: Right-sided lung opacities    Skeleton/soft tissues: Degenerative change..      IMPRESSION:    Right-sided lung opacities    --- End of Report ---            HIREN SILVA MD; Attending Radiologist  This document has been electronically signed. Dec 10 2024 11:50AM      ACC: [id number] EXAM:  ECHO TTE WO CON COMP W DOPP                          PROCEDURE DATE:  12/10/2024          INTERPRETATION:   [place] [address]                Phone: [phone].   TRANSTHORACIC ECHOCARDIOGRAM REPORT        Patient Name:   HARIS TO Accession #: [id number]  Medical Rec #:  [id number]    Height:      62.0 in 157.5 cm  YOB: 1960   Weight:      281.0 lb 127.46 kg  Patient Age:    64 years    BSA:         2.21 m²  Patient Gender: F           BP:          105/69 mmHg      Date of Exam:        12/10/2024 12:49:44 PM  Referring Physician: Zeynep Mccarthy  Sonographer:         Nancy Scales  Reading Physician:   Santiago Tam.    Procedure:     2D Echo/Doppler/Color Doppler Complete.  Indications:   R06.00 - Dyspnea, unspecified  Diagnosis:     R06.00 - Dyspnea, unspecified  Study Details: Technically limited study.        Summary:   1. Technically limited study.   2. Normal global left ventricular systolic function.   3. LV Ejection Fraction by Omalley's Method with a biplane EF of 54 %.   4. LV apex not well visualized.   5. Severe concentric left ventricular hypertrophy.   6. The left ventricular diastolic function could not be assessed in this   study.   7. Normal left atrial size.   8. Normal right atrial size.   9. Mild aortic valve stenosis.  10. Mild thickening and calcification of the posterior mitral valve   leaflet.  11. Mild to moderate mitral annular calcification.  12. Trace mitral valve regurgitation.  13. Adequate TR velocity was not obtained to accurately assess RVSP.  14. There is no evidence of pericardial effusion.    PHYSICIAN INTERPRETATION:  Left Ventricle: The left ventricular internal cavity size is normal.   There is severe concentric left ventricular hypertrophy. Global LV   systolic function was normal. Normal segmental left ventricular systolic   function. The left ventricular diastolic function could not be assessed   in this study.  LV apex not well visualized.  Right Ventricle: The right ventricle was not well visualized. RV systolic   function is normal.  Left Atrium: Normal left atrial size.  Right Atrium: Normal right atrial size.  Pericardium: There is no evidence of pericardial effusion.  Mitral Valve: Mild thickening and calcification of the posterior mitral   valve leaflet. There is mild to moderate mitral annular calcification. No   evidence of mitral stenosis. Trace mitral valve regurgitation is seen.  Tricuspid Valve: Structurally normal tricuspid valve, with normal leaflet   excursion. No tricuspid regurgitation is visualized. Adequate TR velocity   was not obtained to accurately assess RVSP.  Aortic Valve: The aortic valve is trileaflet. Mild aortic stenosis is   present. No evidence of aortic valve regurgitation is seen.  Pulmonic Valve: The pulmonic valve was not well visualized. No indication   of pulmonic valve regurgitation.  Aorta: The aortic root and ascending aorta are structurallynormal, with   no evidence of dilitation.  Venous: The inferior vena cava is abnormal. The inferior vena cava was   dilated, with respiratory size variation greater than 50%.      2D AND M-MODE MEASUREMENTS (normal ranges within parentheses):  Left Ventricle:                  Normal   Aorta/Left Atrium:            Normal  IVSd (2D):              1.60 cm (0.7-1.1) Aortic Root (2D):  2.90 cm   (2.4-3.7)  LVPWd (2D):             1.30 cm (0.7-1.1) Left Atrium (2D):  4.60 cm   (1.9-4.0)  LVIDd (2D):          4.80 cm (3.4-5.7)  LVIDs (2D):             3.10 cm  LV FS (2D):             35.4 %   (>25%)  Relative Wall Thickness  0.54    (<0.42)    SPECTRAL DOPPLER ANALYSIS:  LV DIASTOLIC FUNCTION:  MV Peak E: 1.01 m/s Decel Time: 219 msec  MV Peak A: 1.57 m/s  E/A Ratio: 0.64    Aortic Valve:  AoV VMax:    2.41 m/s  AoV Area, Vmax:    1.80 cm² Vmax Indx:    0.81   cm²/m²  AoV VTI:     0.45 m    AoV Area, VTI:     1.98 cm² VTI Indx:     0.90   cm²/m²  AoV Pk Grad: 23.2 mmHg AoV Area, Mn Grad: 1.92 cm² Mn Grad Indx: 0.87   cm²/m²  AoV Mn Grad: 12.0 mmHg    LVOT Vmax: 1.25 m/s  LVOT VTI:  0.26 m  LVOT Diam: 2.10 cm    Mitral Valve:  MV VMax:    1.71 m/s MV P1/2 Time: 63.36 msec  MV Mn Grad: 4.0 mmHg MV Area, PHT: 3.47 cm²    Tricuspid Valve andPA/RV Systolic Pressure: TR Max Velocity:  RA   Pressure: 8 mmHg RVSP/PASP:      [id number] Santiago Tam, Electronically signed on 12/10/2024 at 2:47:13 PM        *** Final ***

## 2024-12-14 LAB
ANION GAP SERPL CALC-SCNC: 9 MMOL/L — SIGNIFICANT CHANGE UP (ref 7–14)
BASOPHILS # BLD AUTO: 0.06 K/UL — SIGNIFICANT CHANGE UP (ref 0–0.2)
BASOPHILS NFR BLD AUTO: 0.4 % — SIGNIFICANT CHANGE UP (ref 0–1)
BUN SERPL-MCNC: 26 MG/DL — HIGH (ref 10–20)
CALCIUM SERPL-MCNC: 9.6 MG/DL — SIGNIFICANT CHANGE UP (ref 8.4–10.5)
CHLORIDE SERPL-SCNC: 90 MMOL/L — LOW (ref 98–110)
CO2 SERPL-SCNC: 38 MMOL/L — HIGH (ref 17–32)
CREAT SERPL-MCNC: 0.6 MG/DL — LOW (ref 0.7–1.5)
EGFR: 99 ML/MIN/1.73M2 — SIGNIFICANT CHANGE UP
EOSINOPHIL # BLD AUTO: 0 K/UL — SIGNIFICANT CHANGE UP (ref 0–0.7)
EOSINOPHIL NFR BLD AUTO: 0 % — SIGNIFICANT CHANGE UP (ref 0–8)
GLUCOSE SERPL-MCNC: 128 MG/DL — HIGH (ref 70–99)
HCT VFR BLD CALC: 35.5 % — LOW (ref 37–47)
HGB BLD-MCNC: 10.6 G/DL — LOW (ref 12–16)
IMM GRANULOCYTES NFR BLD AUTO: 4 % — HIGH (ref 0.1–0.3)
LYMPHOCYTES # BLD AUTO: 14.8 % — LOW (ref 20.5–51.1)
LYMPHOCYTES # BLD AUTO: 2.17 K/UL — SIGNIFICANT CHANGE UP (ref 1.2–3.4)
MAGNESIUM SERPL-MCNC: 2.4 MG/DL — SIGNIFICANT CHANGE UP (ref 1.8–2.4)
MCHC RBC-ENTMCNC: 26.3 PG — LOW (ref 27–31)
MCHC RBC-ENTMCNC: 29.9 G/DL — LOW (ref 32–37)
MCV RBC AUTO: 88.1 FL — SIGNIFICANT CHANGE UP (ref 81–99)
MONOCYTES # BLD AUTO: 1.21 K/UL — HIGH (ref 0.1–0.6)
MONOCYTES NFR BLD AUTO: 8.3 % — SIGNIFICANT CHANGE UP (ref 1.7–9.3)
NEUTROPHILS # BLD AUTO: 10.59 K/UL — HIGH (ref 1.4–6.5)
NEUTROPHILS NFR BLD AUTO: 72.5 % — SIGNIFICANT CHANGE UP (ref 42.2–75.2)
NRBC # BLD: 0 /100 WBCS — SIGNIFICANT CHANGE UP (ref 0–0)
PHOSPHATE SERPL-MCNC: 4.1 MG/DL — SIGNIFICANT CHANGE UP (ref 2.1–4.9)
PLATELET # BLD AUTO: 403 K/UL — HIGH (ref 130–400)
PMV BLD: 9.6 FL — SIGNIFICANT CHANGE UP (ref 7.4–10.4)
POTASSIUM SERPL-MCNC: 4.7 MMOL/L — SIGNIFICANT CHANGE UP (ref 3.5–5)
POTASSIUM SERPL-SCNC: 4.7 MMOL/L — SIGNIFICANT CHANGE UP (ref 3.5–5)
RBC # BLD: 4.03 M/UL — LOW (ref 4.2–5.4)
RBC # FLD: 13.4 % — SIGNIFICANT CHANGE UP (ref 11.5–14.5)
SODIUM SERPL-SCNC: 137 MMOL/L — SIGNIFICANT CHANGE UP (ref 135–146)
WBC # BLD: 14.62 K/UL — HIGH (ref 4.8–10.8)
WBC # FLD AUTO: 14.62 K/UL — HIGH (ref 4.8–10.8)

## 2024-12-14 RX ADMIN — ACETAMINOPHEN 500MG 650 MILLIGRAM(S): 500 TABLET, COATED ORAL at 21:14

## 2024-12-14 RX ADMIN — AZITHROMYCIN 500 MILLIGRAM(S): 250 TABLET, FILM COATED ORAL at 12:54

## 2024-12-14 RX ADMIN — IPRATROPIUM BROMIDE AND ALBUTEROL SULFATE 3 MILLILITER(S): 2.5; .5 SOLUTION RESPIRATORY (INHALATION) at 08:04

## 2024-12-14 RX ADMIN — Medication 100 MILLIGRAM(S): at 05:28

## 2024-12-14 RX ADMIN — Medication 81 MILLIGRAM(S): at 12:54

## 2024-12-14 RX ADMIN — CHLORHEXIDINE GLUCONATE 1 APPLICATION(S): 1.2 RINSE ORAL at 05:42

## 2024-12-14 RX ADMIN — ACETAMINOPHEN 500MG 650 MILLIGRAM(S): 500 TABLET, COATED ORAL at 21:44

## 2024-12-14 RX ADMIN — PANTOPRAZOLE SODIUM 40 MILLIGRAM(S): 40 TABLET, DELAYED RELEASE ORAL at 05:42

## 2024-12-14 RX ADMIN — Medication 60 MILLIGRAM(S): at 17:56

## 2024-12-14 RX ADMIN — Medication 60 MILLIGRAM(S): at 05:42

## 2024-12-14 RX ADMIN — IPRATROPIUM BROMIDE AND ALBUTEROL SULFATE 3 MILLILITER(S): 2.5; .5 SOLUTION RESPIRATORY (INHALATION) at 19:22

## 2024-12-14 RX ADMIN — Medication 40 MILLIGRAM(S): at 21:14

## 2024-12-14 RX ADMIN — FLUTICASONE PROPIONATE AND SALMETEROL XINAFOATE 1 DOSE(S): 45; 21 AEROSOL, METERED RESPIRATORY (INHALATION) at 08:27

## 2024-12-14 RX ADMIN — IPRATROPIUM BROMIDE AND ALBUTEROL SULFATE 3 MILLILITER(S): 2.5; .5 SOLUTION RESPIRATORY (INHALATION) at 14:41

## 2024-12-14 RX ADMIN — CLOPIDOGREL 75 MILLIGRAM(S): 75 TABLET, FILM COATED ORAL at 12:54

## 2024-12-14 RX ADMIN — ACETAMINOPHEN, DIPHENHYDRAMINE HCL, PHENYLEPHRINE HCL 5 MILLIGRAM(S): 325; 25; 5 TABLET ORAL at 21:14

## 2024-12-14 NOTE — PROGRESS NOTE ADULT - SUBJECTIVE AND OBJECTIVE BOX
Subjective/Objective:     64 year old female who presents for shortness of breath    MEDICATIONS  (STANDING):  albuterol/ipratropium for Nebulization 3 milliLiter(s) Nebulizer every 6 hours  aspirin  chewable 81 milliGRAM(s) Oral daily  atorvastatin 40 milliGRAM(s) Oral at bedtime  azithromycin   Tablet 500 milliGRAM(s) Oral every 24 hours  chlorhexidine 2% Cloths 1 Application(s) Topical <User Schedule>  clopidogrel Tablet 75 milliGRAM(s) Oral daily  fluticasone propionate/ salmeterol 250-50 MICROgram(s) Diskus 1 Dose(s) Inhalation two times a day  hydrochlorothiazide 25 milliGRAM(s) Oral daily  influenza  Vaccine (HIGH DOSE) 0.5 milliLiter(s) IntraMuscular once  methylPREDNISolone sodium succinate Injectable 60 milliGRAM(s) IV Push every 12 hours  pantoprazole    Tablet 40 milliGRAM(s) Oral before breakfast    MEDICATIONS  (PRN):  acetaminophen     Tablet .. 650 milliGRAM(s) Oral every 6 hours PRN Temp greater or equal to 38C (100.4F), Mild Pain (1 - 3)  aluminum hydroxide/magnesium hydroxide/simethicone Suspension 30 milliLiter(s) Oral every 4 hours PRN Dyspepsia  guaifenesin/dextromethorphan Oral Liquid 10 milliLiter(s) Oral four times a day PRN Cough  melatonin 5 milliGRAM(s) Oral at bedtime PRN Insomnia  ondansetron Injectable 4 milliGRAM(s) IV Push every 8 hours PRN Nausea and/or Vomiting          Vital Signs Last 24 Hrs  T(C): 36.8 (14 Dec 2024 05:15), Max: 36.8 (13 Dec 2024 21:23)  T(F): 98.2 (14 Dec 2024 05:15), Max: 98.2 (13 Dec 2024 21:23)  HR: 65 (14 Dec 2024 05:15) (65 - 87)  BP: 149/82 (14 Dec 2024 05:15) (149/82 - 165/80)  BP(mean): --  RR: 18 (14 Dec 2024 05:15) (18 - 18)  SpO2: 100% (14 Dec 2024 05:15) (98% - 100%)    Parameters below as of 14 Dec 2024 05:15  Patient On (Oxygen Delivery Method): nasal cannula  O2 Flow (L/min): 2    I&O's Detail          GENERAL:  63y/o Female NAD, resting comfortably.  HEAD:  Atraumatic, Normocephalic  EYES: EOMI, PERRLA, conjunctiva and sclera clear  NECK: Supple, No JVD, no cervical lymphadenopathy, non-tender  CHEST/LUNG: Clear to auscultation bilaterally; No wheeze, rhonchi, or rales  HEART: Regular rate and rhythm; S1&S2  ABDOMEN: Soft, Nontender, Nondistended x 4 quadrants; Bowel sounds present  EXTREMITIES:   Peripheral Pulses Present, No clubbing, no cyanosis, or no edema, no calf tenderness  PSYCH: AAOx3, cooperative, appropriate  NEUROLOGY: WNL  SKIN: WNL        EKG/ TELEM:    LABS:                          10.6   14.62 )-----------( 403      ( 14 Dec 2024 07:36 )             35.5       14 Dec 2024 07:36    137    |  90[L]  |  26[H]  ----------------------------<  128[H]  4.7     |  38[H]  |  0.6[L]    Ca    9.6        14 Dec 2024 07:36  Phos  4.1       14 Dec 2024 07:36  Mg     2.4       14 Dec 2024 07:36                        Diagnostic testing:  < from: TTE Echo Complete w/o Contrast w/ Doppler (12.10.24 @ 12:49) >    Summary:   1. Technically limited study.   2. Normal global left ventricular systolic function.   3. LV Ejection Fraction by Omalley's Method with a biplane EF of 54 %.   4. LV apex not well visualized.   5. Severe concentric left ventricular hypertrophy.   6. The left ventricular diastolic function could not be assessed in this   study.   7. Normal left atrial size.   8. Normal right atrial size.   9. Mild aortic valve stenosis.  10. Mild thickening and calcification of the posterior mitral valve   leaflet.  11. Mild to moderate mitral annular calcification.  12. Trace mitral valve regurgitation.  13. Adequate TR velocity was not obtained to accurately assess RVSP.  14. There is no evidence of pericardial effusion.    PHYSICIAN INTERPRETATION:  Left Ventricle: The left ventricular internal cavity size is normal.   There is severe concentric left ventricular hypertrophy. Global LV   systolic function was normal. Normal segmental left ventricular systolic   function. The left ventricular diastolic function could not be assessed   in this study.  LV apex not well visualized.  Right Ventricle: The right ventricle was not well visualized. RV systolic   function is normal.  Left Atrium: Normal left atrial size.  Right Atrium: Normal right atrial size.  Pericardium: There is no evidence of pericardial effusion.  Mitral Valve: Mild thickening and calcification of the posterior mitral   valve leaflet. There is mild to moderate mitral annular calcification. No   evidence of mitral stenosis. Trace mitral valve regurgitation is seen.  Tricuspid Valve: Structurally normal tricuspid valve, with normal leaflet   excursion. No tricuspid regurgitation is visualized. Adequate TR velocity   was not obtained to accurately assess RVSP.  Aortic Valve: The aortic valve is trileaflet. Mild aortic stenosis is   present. No evidence of aortic valve regurgitation is seen.  Pulmonic Valve: The pulmonic valve was not well visualized. No indication   of pulmonic valve regurgitation.  Aorta: The aortic root and ascending aorta are structurallynormal, with   no evidence of dilitation.  Venous: The inferior vena cava is abnormal. The inferior vena cava was   dilated, with respiratory size variation greater than 50%.    < end of copied text >        Assessment and Plan:

## 2024-12-14 NOTE — PROGRESS NOTE ADULT - SUBJECTIVE AND OBJECTIVE BOX
63 yo female PMHx of COPD (on home O2 5L) CAD s/p stents (on Plavix)  HTN  shortness of breath    lying in bed off of BIPAP    PAST MEDICAL & SURGICAL HISTORY:    HTN (hypertension)  Chronic obstructive pulmonary disease (COPD)  Hyperlipidemia  CAD (coronary artery disease)  S/P cholecystectomy  H/O tear of meniscus of knee joint    Social History:  smoker x 25 years, quit 3 years ago (09 Dec 2024 23:00)    MEDICATIONS  (STANDING):  albuterol/ipratropium for Nebulization 3 milliLiter(s) Nebulizer every 6 hours  aspirin  chewable 81 milliGRAM(s) Oral daily  atorvastatin 40 milliGRAM(s) Oral at bedtime  azithromycin   Tablet 500 milliGRAM(s) Oral every 24 hours  chlorhexidine 2% Cloths 1 Application(s) Topical <User Schedule>  clopidogrel Tablet 75 milliGRAM(s) Oral daily  fluticasone propionate/ salmeterol 250-50 MICROgram(s) Diskus 1 Dose(s) Inhalation two times a day  hydrochlorothiazide 25 milliGRAM(s) Oral daily  influenza  Vaccine (HIGH DOSE) 0.5 milliLiter(s) IntraMuscular once  methylPREDNISolone sodium succinate Injectable 60 milliGRAM(s) IV Push every 12 hours  pantoprazole    Tablet 40 milliGRAM(s) Oral before breakfast    MEDICATIONS  (PRN):  acetaminophen     Tablet .. 650 milliGRAM(s) Oral every 6 hours PRN Temp greater or equal to 38C (100.4F), Mild Pain (1 - 3)  aluminum hydroxide/magnesium hydroxide/simethicone Suspension 30 milliLiter(s) Oral every 4 hours PRN Dyspepsia  guaifenesin/dextromethorphan Oral Liquid 10 milliLiter(s) Oral four times a day PRN Cough  melatonin 5 milliGRAM(s) Oral at bedtime PRN Insomnia  ondansetron Injectable 4 milliGRAM(s) IV Push every 8 hours PRN Nausea and/or Vomiting    Allergies    No Known Allergies    Intolerances    Vital Signs Last 24 Hrs  T(C): 36.8 (14 Dec 2024 05:15), Max: 36.8 (13 Dec 2024 21:23)  T(F): 98.2 (14 Dec 2024 05:15), Max: 98.2 (13 Dec 2024 21:23)  HR: 65 (14 Dec 2024 05:15) (65 - 87)  BP: 149/82 (14 Dec 2024 05:15) (149/82 - 165/80)  BP(mean): --  RR: 18 (14 Dec 2024 05:15) (18 - 18)  SpO2: 100% (14 Dec 2024 05:15) (98% - 100%)    Parameters below as of 14 Dec 2024 05:15  Patient On (Oxygen Delivery Method): nasal cannula  O2 Flow (L/min): 2      Constitutional: NAD   HEENT: Airway patent, moist MM, no erythema/swelling/deformity of oral structures. EOMI, PERRLA.  CV: regular rate, regular rhythm, well-perfused extremities, 2+ b/l DP and radial pulses equal.  Lungs: + wheeze,  no rales, rhonchi   ABD: NTND, no guarding or rebound, no pulsatile mass, no hernias.   MSK: Neck supple, nontender, nl ROM, no stepoff. Chest nontender. Back nontender in TLS spine or to b/l bony structures or flanks. Ext nontender, nl rom, no deformity.   INTEG: Skin warm, dry, no rash.  NEURO: A&Ox3, normal strength, nl sensation throughout, normal speech.   PSYCH: Denies SI/HI/hallucinations    LAB:

## 2024-12-14 NOTE — CHART NOTE - NSCHARTNOTEFT_GEN_A_CORE
10.6   14.62 )-----------( 403      ( 14 Dec 2024 07:36 )             35.5       12-14    137  |  90[L]  |  26[H]  ----------------------------<  128[H]  4.7   |  38[H]  |  0.6[L]    Ca    9.6      14 Dec 2024 07:36  Phos  4.1     12-14  Mg     2.4     12-14            ABG - ( 13 Dec 2024 04:30 )  pH, Arterial: 7.43  pH, Blood: x     /  pCO2: 67    /  pO2: 164   / HCO3: 44    / Base Excess: 16.6  /  SaO2: 99.8        Case discussed with Dr Gardner  - labs acceptable  - will continue present care  - patient for possible Cardiac cath next week  - no AM labs needed                              CAPILLARY BLOOD GLUCOSE

## 2024-12-14 NOTE — PROGRESS NOTE ADULT - ASSESSMENT
65 yo female pmh CAD s/p stents (2022) at [facility] (on Plavix)  HTN, COPD (on home O2 5L) , recent URI  presents to ER c/o shortness of breath x 1 week.       Trop 157>154>102  ecg NSR IRBBB; no acute ischemic changes  ECHO 12/10/24: normal global lvsf EF 54% sev concentric lvh    Plan  # Troponin elevation  # Dyspnea  - breathing improving but still  with some bilateral wheezing and congestion  - Troponin peaked and downtrended no need to trend further   - sob similar to symptoms prior to her stents  - labs and imaging reviewed   - Patient pending inpatient LHC when wheezing resolves; pulmonary following  - She uses dental device.   - prn O2 and neb tx  - pt aware and agreeable to cath

## 2024-12-14 NOTE — PROGRESS NOTE ADULT - ASSESSMENT
63 yo female PMHx of COPD (on home O2 5L) CAD s/p stents (on Plavix)  HTN presents to ER c/o shortness of breath x 1 week.     # NSTEMI   # h/o CAD s/p stents      - cardiac monitoring   - trend troponin  +  - echo   - cardiology consult reviewed  - pt anticoagulated in ER  - c/w Plavix      # hypercapnic acidosis respiratory failure   # COPD exacerbation   # URI/cough   - supplemental O2, wean Bipap  - c/w solumedrol decr to daily  - c/w inhalers   - nebs prn   - LABA/ICS  - pulmonary consult     # h/o HTN  c/w HCTZ    # h/o HLD  c/w statin     # dvt ppx   # gi ppx   # full code

## 2024-12-15 LAB
CULTURE RESULTS: SIGNIFICANT CHANGE UP
CULTURE RESULTS: SIGNIFICANT CHANGE UP
SPECIMEN SOURCE: SIGNIFICANT CHANGE UP
SPECIMEN SOURCE: SIGNIFICANT CHANGE UP

## 2024-12-15 RX ADMIN — ACETAMINOPHEN 500MG 650 MILLIGRAM(S): 500 TABLET, COATED ORAL at 22:15

## 2024-12-15 RX ADMIN — IPRATROPIUM BROMIDE AND ALBUTEROL SULFATE 3 MILLILITER(S): 2.5; .5 SOLUTION RESPIRATORY (INHALATION) at 07:59

## 2024-12-15 RX ADMIN — FLUTICASONE PROPIONATE AND SALMETEROL XINAFOATE 1 DOSE(S): 45; 21 AEROSOL, METERED RESPIRATORY (INHALATION) at 08:34

## 2024-12-15 RX ADMIN — Medication 60 MILLIGRAM(S): at 17:26

## 2024-12-15 RX ADMIN — ACETAMINOPHEN, DIPHENHYDRAMINE HCL, PHENYLEPHRINE HCL 5 MILLIGRAM(S): 325; 25; 5 TABLET ORAL at 22:15

## 2024-12-15 RX ADMIN — PANTOPRAZOLE SODIUM 40 MILLIGRAM(S): 40 TABLET, DELAYED RELEASE ORAL at 05:11

## 2024-12-15 RX ADMIN — Medication 60 MILLIGRAM(S): at 05:12

## 2024-12-15 RX ADMIN — Medication 81 MILLIGRAM(S): at 12:20

## 2024-12-15 RX ADMIN — IPRATROPIUM BROMIDE AND ALBUTEROL SULFATE 3 MILLILITER(S): 2.5; .5 SOLUTION RESPIRATORY (INHALATION) at 19:28

## 2024-12-15 RX ADMIN — ACETAMINOPHEN 500MG 650 MILLIGRAM(S): 500 TABLET, COATED ORAL at 23:15

## 2024-12-15 RX ADMIN — CLOPIDOGREL 75 MILLIGRAM(S): 75 TABLET, FILM COATED ORAL at 12:20

## 2024-12-15 RX ADMIN — IPRATROPIUM BROMIDE AND ALBUTEROL SULFATE 3 MILLILITER(S): 2.5; .5 SOLUTION RESPIRATORY (INHALATION) at 14:09

## 2024-12-15 RX ADMIN — Medication 40 MILLIGRAM(S): at 22:15

## 2024-12-15 RX ADMIN — Medication 10 MILLILITER(S): at 15:56

## 2024-12-15 NOTE — PROGRESS NOTE ADULT - ASSESSMENT
65 yo female PMHx of COPD (on home O2 5L) CAD s/p stents (on Plavix)  HTN presents to ER c/o shortness of breath x 1 week.     # NSTEMI   # h/o CAD s/p stents      - cardiac monitoring   - trend troponin  +  - echo   - cardiology consult reviewed  - pt anticoagulated in ER  - c/w Plavix      # hypercapnic acidosis respiratory failure   # COPD exacerbation   # URI/cough   - supplemental O2, wean Bipap  - c/w solumedrol decr to daily  - c/w inhalers   - nebs prn   - LABA/ICS  - pulmonary consult     # h/o HTN  c/w HCTZ    # h/o HLD  c/w statin     # dvt ppx   # gi ppx   # full code

## 2024-12-15 NOTE — PROGRESS NOTE ADULT - SUBJECTIVE AND OBJECTIVE BOX
65 yo female PMHx of COPD (on home O2 5L) CAD s/p stents (on Plavix)  HTN  shortness of breath    lying in bed off of BIPAP    PAST MEDICAL & SURGICAL HISTORY:    HTN (hypertension)  Chronic obstructive pulmonary disease (COPD)  Hyperlipidemia  CAD (coronary artery disease)  S/P cholecystectomy  H/O tear of meniscus of knee joint    Social History:  smoker x 25 years, quit 3 years ago (09 Dec 2024 23:00)    MEDICATIONS  (STANDING):  albuterol/ipratropium for Nebulization 3 milliLiter(s) Nebulizer every 6 hours  aspirin  chewable 81 milliGRAM(s) Oral daily  atorvastatin 40 milliGRAM(s) Oral at bedtime  chlorhexidine 2% Cloths 1 Application(s) Topical <User Schedule>  clopidogrel Tablet 75 milliGRAM(s) Oral daily  fluticasone propionate/ salmeterol 250-50 MICROgram(s) Diskus 1 Dose(s) Inhalation two times a day  hydrochlorothiazide 25 milliGRAM(s) Oral daily  influenza  Vaccine (HIGH DOSE) 0.5 milliLiter(s) IntraMuscular once  methylPREDNISolone sodium succinate Injectable 60 milliGRAM(s) IV Push every 12 hours  pantoprazole    Tablet 40 milliGRAM(s) Oral before breakfast    MEDICATIONS  (PRN):  acetaminophen     Tablet .. 650 milliGRAM(s) Oral every 6 hours PRN Temp greater or equal to 38C (100.4F), Mild Pain (1 - 3)  aluminum hydroxide/magnesium hydroxide/simethicone Suspension 30 milliLiter(s) Oral every 4 hours PRN Dyspepsia  guaifenesin/dextromethorphan Oral Liquid 10 milliLiter(s) Oral four times a day PRN Cough  melatonin 5 milliGRAM(s) Oral at bedtime PRN Insomnia  ondansetron Injectable 4 milliGRAM(s) IV Push every 8 hours PRN Nausea and/or Vomiting      Allergies    No Known Allergies    Intolerances    Vital Signs Last 24 Hrs  T(C): 36.6 (15 Dec 2024 04:44), Max: 37.4 (14 Dec 2024 21:03)  T(F): 97.9 (15 Dec 2024 04:44), Max: 99.4 (14 Dec 2024 21:03)  HR: 68 (15 Dec 2024 04:44) (68 - 79)  BP: 134/75 (15 Dec 2024 04:44) (125/51 - 137/77)  BP(mean): --  RR: 18 (15 Dec 2024 04:44) (16 - 18)  SpO2: 98% (15 Dec 2024 04:44) (98% - 99%)    Parameters below as of 14 Dec 2024 21:03  Patient On (Oxygen Delivery Method): nasal cannula  O2 Flow (L/min): 4      Constitutional: NAD   HEENT: Airway patent, moist MM, no erythema/swelling/deformity of oral structures. EOMI, PERRLA.  CV: regular rate, regular rhythm, well-perfused extremities, 2+ b/l DP and radial pulses equal.  Lungs: + wheeze,  no rales, rhonchi   ABD: NTND, no guarding or rebound, no pulsatile mass, no hernias.   MSK: Neck supple, nontender, nl ROM, no stepoff. Chest nontender. Back nontender in TLS spine or to b/l bony structures or flanks. Ext nontender, nl rom, no deformity.   INTEG: Skin warm, dry, no rash.  NEURO: A&Ox3, normal strength, nl sensation throughout, normal speech.   PSYCH: Denies SI/HI/hallucinations    LAB:                           10.6   14.62 )-----------( 403      ( 14 Dec 2024 07:36 )             35.5   12-14    137  |  90[L]  |  26[H]  ----------------------------<  128[H]  4.7   |  38[H]  |  0.6[L]    Ca    9.6      14 Dec 2024 07:36  Phos  4.1     12-14  Mg     2.4     12-14

## 2024-12-16 LAB
ANION GAP SERPL CALC-SCNC: 11 MMOL/L — SIGNIFICANT CHANGE UP (ref 7–14)
APTT BLD: 24.9 SEC — LOW (ref 27–39.2)
BUN SERPL-MCNC: 31 MG/DL — HIGH (ref 10–20)
CALCIUM SERPL-MCNC: 9.5 MG/DL — SIGNIFICANT CHANGE UP (ref 8.4–10.5)
CHLORIDE SERPL-SCNC: 91 MMOL/L — LOW (ref 98–110)
CO2 SERPL-SCNC: 35 MMOL/L — HIGH (ref 17–32)
CREAT SERPL-MCNC: 0.7 MG/DL — SIGNIFICANT CHANGE UP (ref 0.7–1.5)
EGFR: 95 ML/MIN/1.73M2 — SIGNIFICANT CHANGE UP
GLUCOSE SERPL-MCNC: 190 MG/DL — HIGH (ref 70–99)
HCT VFR BLD CALC: 37.8 % — SIGNIFICANT CHANGE UP (ref 37–47)
HGB BLD-MCNC: 11.4 G/DL — LOW (ref 12–16)
INR BLD: 0.93 RATIO — SIGNIFICANT CHANGE UP (ref 0.65–1.3)
MAGNESIUM SERPL-MCNC: 2.3 MG/DL — SIGNIFICANT CHANGE UP (ref 1.8–2.4)
MCHC RBC-ENTMCNC: 26.5 PG — LOW (ref 27–31)
MCHC RBC-ENTMCNC: 30.2 G/DL — LOW (ref 32–37)
MCV RBC AUTO: 87.9 FL — SIGNIFICANT CHANGE UP (ref 81–99)
NRBC # BLD: 0 /100 WBCS — SIGNIFICANT CHANGE UP (ref 0–0)
PLATELET # BLD AUTO: 450 K/UL — HIGH (ref 130–400)
PMV BLD: 9.9 FL — SIGNIFICANT CHANGE UP (ref 7.4–10.4)
POTASSIUM SERPL-MCNC: 5.1 MMOL/L — HIGH (ref 3.5–5)
POTASSIUM SERPL-SCNC: 5.1 MMOL/L — HIGH (ref 3.5–5)
PROTHROM AB SERPL-ACNC: 11 SEC — SIGNIFICANT CHANGE UP (ref 9.95–12.87)
RBC # BLD: 4.3 M/UL — SIGNIFICANT CHANGE UP (ref 4.2–5.4)
RBC # FLD: 14.1 % — SIGNIFICANT CHANGE UP (ref 11.5–14.5)
SODIUM SERPL-SCNC: 137 MMOL/L — SIGNIFICANT CHANGE UP (ref 135–146)
WBC # BLD: 18.45 K/UL — HIGH (ref 4.8–10.8)
WBC # FLD AUTO: 18.45 K/UL — HIGH (ref 4.8–10.8)

## 2024-12-16 PROCEDURE — 99232 SBSQ HOSP IP/OBS MODERATE 35: CPT | Mod: FS

## 2024-12-16 PROCEDURE — 99233 SBSQ HOSP IP/OBS HIGH 50: CPT

## 2024-12-16 RX ADMIN — Medication 81 MILLIGRAM(S): at 11:50

## 2024-12-16 RX ADMIN — ACETAMINOPHEN, DIPHENHYDRAMINE HCL, PHENYLEPHRINE HCL 5 MILLIGRAM(S): 325; 25; 5 TABLET ORAL at 21:38

## 2024-12-16 RX ADMIN — FLUTICASONE PROPIONATE AND SALMETEROL XINAFOATE 1 DOSE(S): 45; 21 AEROSOL, METERED RESPIRATORY (INHALATION) at 08:58

## 2024-12-16 RX ADMIN — IPRATROPIUM BROMIDE AND ALBUTEROL SULFATE 3 MILLILITER(S): 2.5; .5 SOLUTION RESPIRATORY (INHALATION) at 07:23

## 2024-12-16 RX ADMIN — ACETAMINOPHEN 500MG 650 MILLIGRAM(S): 500 TABLET, COATED ORAL at 04:54

## 2024-12-16 RX ADMIN — IPRATROPIUM BROMIDE AND ALBUTEROL SULFATE 3 MILLILITER(S): 2.5; .5 SOLUTION RESPIRATORY (INHALATION) at 14:54

## 2024-12-16 RX ADMIN — FLUTICASONE PROPIONATE AND SALMETEROL XINAFOATE 1 DOSE(S): 45; 21 AEROSOL, METERED RESPIRATORY (INHALATION) at 21:40

## 2024-12-16 RX ADMIN — Medication 60 MILLIGRAM(S): at 05:40

## 2024-12-16 RX ADMIN — Medication 60 MILLIGRAM(S): at 17:20

## 2024-12-16 RX ADMIN — Medication 30 MILLILITER(S): at 04:23

## 2024-12-16 RX ADMIN — PANTOPRAZOLE SODIUM 40 MILLIGRAM(S): 40 TABLET, DELAYED RELEASE ORAL at 05:39

## 2024-12-16 RX ADMIN — CLOPIDOGREL 75 MILLIGRAM(S): 75 TABLET, FILM COATED ORAL at 11:50

## 2024-12-16 RX ADMIN — Medication 40 MILLIGRAM(S): at 21:38

## 2024-12-16 RX ADMIN — ACETAMINOPHEN 500MG 650 MILLIGRAM(S): 500 TABLET, COATED ORAL at 22:08

## 2024-12-16 RX ADMIN — ACETAMINOPHEN 500MG 650 MILLIGRAM(S): 500 TABLET, COATED ORAL at 21:38

## 2024-12-16 RX ADMIN — ACETAMINOPHEN 500MG 650 MILLIGRAM(S): 500 TABLET, COATED ORAL at 04:24

## 2024-12-16 RX ADMIN — IPRATROPIUM BROMIDE AND ALBUTEROL SULFATE 3 MILLILITER(S): 2.5; .5 SOLUTION RESPIRATORY (INHALATION) at 19:36

## 2024-12-16 NOTE — PROGRESS NOTE ADULT - ASSESSMENT
Impression:  Acute/chronic respiratory failure due to COPD with exacerbation  hypoxia  Currently no Impending respiratory failure  SHANKAR/OHVS  no pneumonia possible viral syndrome    Suggest:  Check O2 sat on NC, record, continue O2 as necessary to maintain sats > 90%  albuterol/ipratropium for Nebulization 3 milliLiter(s) Nebulizer every 6 hours  methylPREDNISolone sodium succinate Injectable start taper in AM  AVAPS hs/PRN.  , EPAP 8, min IPAP 14, max IPAP 25. O2 to keep Karma@ >92%.  OOB to chair  GI and DVT prophylaxis  pulmonary toilet to prevent aspirations  off loading of skin to prevent pressure ulcers  Monitor clinically, convert to oral prednisone as clinical improvement allows  Upon D/C the patient will need ICS/LABA, LAMA, PRN albuterol, finish abx, slow taper of steroids ie. start Prednisone 40 mg and taper 10 mg Q4d.  will need OP NPSG  Pt to see me in the office in several weeks    would hold off on cardiac cath until at least Wednesday

## 2024-12-16 NOTE — PROGRESS NOTE ADULT - ASSESSMENT
Cardiology HPI:  65 yo female pmh CAD s/p stents (2022) at [facility] (on Plavix)  HTN, COPD (on home O2 5L) , recent URI  presents to ER c/o shortness of breath x 1 week.       Trop 157>154>102  ecg NSR IRBBB; no acute ischemic changes  ECHO 12/10/24: normal global lvsf EF 54% sev concentric lvh    Plan  # Troponin elevation  # Dyspnea  - breathing improving but still with some bilateral wheezing and congestion  - Troponin peaked and downtrended no need to trend further   - sob similar to symptoms prior to her stents  - labs and imaging reviewed   - Patient pending inpatient LHC when wheezing resolves; pulmonary following  - She uses dental device.   - prn O2 and neb tx  - pt aware and agreeable to cath

## 2024-12-16 NOTE — CHART NOTE - NSCHARTNOTEFT_GEN_A_CORE
Patient seen by Pulmonary this AM:  Not cleared for Cardiac Cath at this time  - will wait another day, continue present care  - reevaluate in AM  - make NPO if midnight if cleared for procedure for tomorrow

## 2024-12-16 NOTE — CHART NOTE - NSCHARTNOTEFT_GEN_A_CORE
Patient still with + B/L wheezing.  Respiratory status still currently being optimized and patient to be re-evaluated daily.  Patient and primary team aware.

## 2024-12-16 NOTE — PROGRESS NOTE ADULT - SUBJECTIVE AND OBJECTIVE BOX
Subjective/Objective:     64 year old female who presents for shortness of breath    MEDICATIONS  (STANDING):  albuterol/ipratropium for Nebulization 3 milliLiter(s) Nebulizer every 6 hours  aspirin  chewable 81 milliGRAM(s) Oral daily  atorvastatin 40 milliGRAM(s) Oral at bedtime  chlorhexidine 2% Cloths 1 Application(s) Topical <User Schedule>  clopidogrel Tablet 75 milliGRAM(s) Oral daily  fluticasone propionate/ salmeterol 250-50 MICROgram(s) Diskus 1 Dose(s) Inhalation two times a day  hydrochlorothiazide 25 milliGRAM(s) Oral daily  influenza  Vaccine (HIGH DOSE) 0.5 milliLiter(s) IntraMuscular once  methylPREDNISolone sodium succinate Injectable 60 milliGRAM(s) IV Push every 12 hours  pantoprazole    Tablet 40 milliGRAM(s) Oral before breakfast    MEDICATIONS  (PRN):  acetaminophen     Tablet .. 650 milliGRAM(s) Oral every 6 hours PRN Temp greater or equal to 38C (100.4F), Mild Pain (1 - 3)  aluminum hydroxide/magnesium hydroxide/simethicone Suspension 30 milliLiter(s) Oral every 4 hours PRN Dyspepsia  guaifenesin/dextromethorphan Oral Liquid 10 milliLiter(s) Oral four times a day PRN Cough  melatonin 5 milliGRAM(s) Oral at bedtime PRN Insomnia  ondansetron Injectable 4 milliGRAM(s) IV Push every 8 hours PRN Nausea and/or Vomiting          Vital Signs Last 24 Hrs  T(C): 36.6 (16 Dec 2024 05:07), Max: 36.7 (15 Dec 2024 14:09)  T(F): 97.9 (16 Dec 2024 05:07), Max: 98 (15 Dec 2024 14:09)  HR: 70 (16 Dec 2024 05:07) (70 - 77)  BP: 144/77 (16 Dec 2024 05:07) (104/60 - 158/84)  BP(mean): --  RR: 18 (16 Dec 2024 05:07) (17 - 18)  SpO2: 100% (16 Dec 2024 05:07) (97% - 100%)    Parameters below as of 16 Dec 2024 05:07  Patient On (Oxygen Delivery Method): nasal cannula  O2 Flow (L/min): 4    I&O's Detail        GENERAL:  63y/o Female NAD, resting comfortably.  HEAD:  Atraumatic, Normocephalic  EYES: EOMI, PERRLA, conjunctiva and sclera clear  NECK: Supple, No JVD, no cervical lymphadenopathy, non-tender  CHEST/LUNG: B/L wheeze R > L  HEART: Regular rate and rhythm; S1&S2  ABDOMEN: Soft, Nontender, Nondistended x 4 quadrants; Bowel sounds present  EXTREMITIES:   Peripheral Pulses Present, No clubbing, no cyanosis, or no edema, no calf tenderness  PSYCH: AAOx3, cooperative, appropriate  NEUROLOGY: WNL  SKIN: WNL    EKG/ TELEM:    < from: 12 Lead ECG (12.09.24 @ 19:37) >  Ventricular Rate 87 BPM    Atrial Rate 87 BPM    P-R Interval 190 ms    QRS Duration 106 ms    Q-T Interval 402 ms    QTC Calculation(Bazett) 483 ms    P Axis 60 degrees    R Axis -48 degrees    T Axis 69 degrees    Diagnosis Line Sinus rhythm withFusion complexes  Left axis deviation  Incomplete right bundle branch block  Inferior infarct , age undetermined  Anteroseptal infarct , age undetermined  Abnormal ECG    Confirmed by PAMELA VALDERRAMA MD ([id number]) on 12/10/2024 6:58:31 AM    < end of copied text >    LABS:                          11.4   18.45 )-----------( 450      ( 16 Dec 2024 06:10 )             37.8       16 Dec 2024 06:10    137    |  91[L]  |  31[H]  ----------------------------<  190[H]  5.1[H]   |  35[H]  |  0.7      Ca    9.5        16 Dec 2024 06:10  Mg     2.3       16 Dec 2024 06:10

## 2024-12-16 NOTE — CHART NOTE - NSCHARTNOTEFT_GEN_A_CORE
MS Medina is a 65 y/o female admitted with Chronic respiratory failure secondary to COPD.    On this admission she was retaining dangerously high levels of CO2:  61 mmhg.  She was placed on BIPAP 14/8 cm H2O proving BIPAP failed. MS Medina will require a Non-Invasive ventilator for home discharge.  A BIPAP device will be insufficient for this patient. She will require frequent durations of Non Invasive mechanical ventilation.  MS Medina needs aggressive target volume guarantee, advanced alarms which will alert if patient becomes disconnected and a back up battery for the portability. This is required to prevent harm and the risk of exacerbation of her severe and life threatening condition.    Due to her persistent hypercapnia the NIV will provide adequate continuous ventilation to keep the patients Hypercapnia under control. This NIV will provide longer expiratory time to reduce the effects of flow limitation and air trapping. This will decrease the work of breathing, decrease the amount of CO2 and increase oxygenation. This is the only machine that proves the mouthpiece ventilation.    Without this NIV MD Medina will clinically decline and will cause hospitalization. She requires augmented volume , non-invasive ventilation not found on a standard BIPAP

## 2024-12-16 NOTE — CHART NOTE - NSCHARTNOTEFT_GEN_A_CORE
Cardiology NP  - Possible [facility] Wednesday if cleared by Pulmonary  - would place NPO order after midnight tomorrow for possible [facility] Wednesday

## 2024-12-16 NOTE — PROGRESS NOTE ADULT - SUBJECTIVE AND OBJECTIVE BOX
63 yo female PMHx of COPD (on home O2 5L) CAD s/p stents (on Plavix)  HTN  shortness of breath    lying in bed off of BIPAP    PAST MEDICAL & SURGICAL HISTORY:    HTN (hypertension)  Chronic obstructive pulmonary disease (COPD)  Hyperlipidemia  CAD (coronary artery disease)  S/P cholecystectomy  H/O tear of meniscus of knee joint    Social History:  smoker x 25 years, quit 3 years ago (09 Dec 2024 23:00)    MEDICATIONS  (STANDING):  albuterol/ipratropium for Nebulization 3 milliLiter(s) Nebulizer every 6 hours  aspirin  chewable 81 milliGRAM(s) Oral daily  atorvastatin 40 milliGRAM(s) Oral at bedtime  chlorhexidine 2% Cloths 1 Application(s) Topical <User Schedule>  clopidogrel Tablet 75 milliGRAM(s) Oral daily  fluticasone propionate/ salmeterol 250-50 MICROgram(s) Diskus 1 Dose(s) Inhalation two times a day  hydrochlorothiazide 25 milliGRAM(s) Oral daily  influenza  Vaccine (HIGH DOSE) 0.5 milliLiter(s) IntraMuscular once  methylPREDNISolone sodium succinate Injectable 60 milliGRAM(s) IV Push every 12 hours  pantoprazole    Tablet 40 milliGRAM(s) Oral before breakfast    MEDICATIONS  (PRN):  acetaminophen     Tablet .. 650 milliGRAM(s) Oral every 6 hours PRN Temp greater or equal to 38C (100.4F), Mild Pain (1 - 3)  aluminum hydroxide/magnesium hydroxide/simethicone Suspension 30 milliLiter(s) Oral every 4 hours PRN Dyspepsia  guaifenesin/dextromethorphan Oral Liquid 10 milliLiter(s) Oral four times a day PRN Cough  melatonin 5 milliGRAM(s) Oral at bedtime PRN Insomnia  ondansetron Injectable 4 milliGRAM(s) IV Push every 8 hours PRN Nausea and/or Vomiting      Allergies    No Known Allergies    Intolerances    Vital Signs Last 24 Hrs  T(C): 36.6 (16 Dec 2024 05:07), Max: 36.7 (15 Dec 2024 14:09)  T(F): 97.9 (16 Dec 2024 05:07), Max: 98 (15 Dec 2024 14:09)  HR: 70 (16 Dec 2024 05:07) (70 - 77)  BP: 144/77 (16 Dec 2024 05:07) (104/60 - 158/84)  BP(mean): --  RR: 18 (16 Dec 2024 05:07) (17 - 18)  SpO2: 100% (16 Dec 2024 05:07) (97% - 100%)    Parameters below as of 16 Dec 2024 05:07  Patient On (Oxygen Delivery Method): nasal cannula  O2 Flow (L/min): 4      Constitutional: NAD   HEENT: Airway patent, moist MM, no erythema/swelling/deformity of oral structures. EOMI, PERRLA.  CV: regular rate, regular rhythm, well-perfused extremities, 2+ b/l DP and radial pulses equal.  Lungs: bilateral air entry,  no rales, rhonchi   ABD: NTND, no guarding or rebound, no pulsatile mass, no hernias.   MSK: Neck supple, nontender, nl ROM, no stepoff. Chest nontender. Back nontender in TLS spine or to b/l bony structures or flanks. Ext nontender, nl rom, no deformity.   INTEG: Skin warm, dry, no rash.  NEURO: A&Ox3, normal strength, nl sensation throughout, normal speech.   PSYCH: Denies SI/HI/hallucinations    LAB:                           10.6   14.62 )-----------( 403      ( 14 Dec 2024 07:36 )             35.5   12-14    137  |  90[L]  |  26[H]  ----------------------------<  128[H]  4.7   |  38[H]  |  0.6[L]    Ca    9.6      14 Dec 2024 07:36  Phos  4.1     12-14  Mg     2.4     12-14

## 2024-12-17 LAB
ANION GAP SERPL CALC-SCNC: 9 MMOL/L — SIGNIFICANT CHANGE UP (ref 7–14)
BUN SERPL-MCNC: 28 MG/DL — HIGH (ref 10–20)
CALCIUM SERPL-MCNC: 9.3 MG/DL — SIGNIFICANT CHANGE UP (ref 8.4–10.5)
CHLORIDE SERPL-SCNC: 91 MMOL/L — LOW (ref 98–110)
CO2 SERPL-SCNC: 36 MMOL/L — HIGH (ref 17–32)
CREAT SERPL-MCNC: 0.6 MG/DL — LOW (ref 0.7–1.5)
EGFR: 99 ML/MIN/1.73M2 — SIGNIFICANT CHANGE UP
GLUCOSE SERPL-MCNC: 214 MG/DL — HIGH (ref 70–99)
POTASSIUM SERPL-MCNC: 4.6 MMOL/L — SIGNIFICANT CHANGE UP (ref 3.5–5)
POTASSIUM SERPL-SCNC: 4.6 MMOL/L — SIGNIFICANT CHANGE UP (ref 3.5–5)
SODIUM SERPL-SCNC: 136 MMOL/L — SIGNIFICANT CHANGE UP (ref 135–146)

## 2024-12-17 PROCEDURE — 99232 SBSQ HOSP IP/OBS MODERATE 35: CPT

## 2024-12-17 RX ADMIN — CLOPIDOGREL 75 MILLIGRAM(S): 75 TABLET, FILM COATED ORAL at 12:24

## 2024-12-17 RX ADMIN — FLUTICASONE PROPIONATE AND SALMETEROL XINAFOATE 1 DOSE(S): 45; 21 AEROSOL, METERED RESPIRATORY (INHALATION) at 23:21

## 2024-12-17 RX ADMIN — ACETAMINOPHEN, DIPHENHYDRAMINE HCL, PHENYLEPHRINE HCL 5 MILLIGRAM(S): 325; 25; 5 TABLET ORAL at 23:19

## 2024-12-17 RX ADMIN — IPRATROPIUM BROMIDE AND ALBUTEROL SULFATE 3 MILLILITER(S): 2.5; .5 SOLUTION RESPIRATORY (INHALATION) at 07:26

## 2024-12-17 RX ADMIN — IPRATROPIUM BROMIDE AND ALBUTEROL SULFATE 3 MILLILITER(S): 2.5; .5 SOLUTION RESPIRATORY (INHALATION) at 14:40

## 2024-12-17 RX ADMIN — ACETAMINOPHEN 500MG 650 MILLIGRAM(S): 500 TABLET, COATED ORAL at 23:49

## 2024-12-17 RX ADMIN — Medication 60 MILLIGRAM(S): at 17:13

## 2024-12-17 RX ADMIN — Medication 81 MILLIGRAM(S): at 12:24

## 2024-12-17 RX ADMIN — PANTOPRAZOLE SODIUM 40 MILLIGRAM(S): 40 TABLET, DELAYED RELEASE ORAL at 05:20

## 2024-12-17 RX ADMIN — Medication 60 MILLIGRAM(S): at 05:20

## 2024-12-17 RX ADMIN — ACETAMINOPHEN 500MG 650 MILLIGRAM(S): 500 TABLET, COATED ORAL at 23:19

## 2024-12-17 RX ADMIN — Medication 40 MILLIGRAM(S): at 23:19

## 2024-12-17 RX ADMIN — FLUTICASONE PROPIONATE AND SALMETEROL XINAFOATE 1 DOSE(S): 45; 21 AEROSOL, METERED RESPIRATORY (INHALATION) at 08:39

## 2024-12-17 RX ADMIN — IPRATROPIUM BROMIDE AND ALBUTEROL SULFATE 3 MILLILITER(S): 2.5; .5 SOLUTION RESPIRATORY (INHALATION) at 19:26

## 2024-12-17 NOTE — PROGRESS NOTE ADULT - SUBJECTIVE AND OBJECTIVE BOX
Patient is a 64y old  Female who presents with a chief complaint of SOB (16 Dec 2024 08:26)        HPI:  63 yo female PMHx of COPD (on home O2 5L) CAD s/p stents (on Plavix)  HTN presents to ER c/o shortness of breath x 1 week. pt states she had URI symptoms after Thanksgiving, she developed a productive cough with yellow sputum and worsening shortness of breath. Pt states today she had dyspnea only walking a few steps and was unable to catch her breath, no relief from rescue inhaler. In ER pt was placed on bipap, found to be acidotic and hypercapnic, also had elevated troponin, no EKG changes. ICU consulted and recommend admission to telemetry. Denies fever, chills, n/v/d, abdominal pain, dysuria.   (09 Dec 2024 23:00)      PAST MEDICAL & SURGICAL HISTORY:  HTN (hypertension)      Chronic obstructive pulmonary disease (COPD)      Hyperlipidemia      CAD (coronary artery disease)      S/P cholecystectomy      H/O tear of meniscus of knee joint          FAMILY HISTORY:  FH: myocardial infarction (Father)    Family history of valvular heart disease (Mother)          Pt evaluated on rounds.  I reviewed the radiology tests and hospital record.    I reviewed previous notes on this patient.    Interval Events: No overnight events.    REVIEW OF SYSTEMS:   see HPI      OBJECTIVE:  ICU Vital Signs Last 24 Hrs  T(C): 36.8 (17 Dec 2024 05:00), Max: 36.8 (17 Dec 2024 05:00)  T(F): 98.2 (17 Dec 2024 05:00), Max: 98.2 (17 Dec 2024 05:00)  HR: 66 (17 Dec 2024 05:00) (66 - 78)  BP: 127/67 (17 Dec 2024 05:00) (127/67 - 155/63)  RR: 18 (17 Dec 2024 05:00) (18 - 20)  SpO2: 98% (17 Dec 2024 05:00) (97% - 98%)    O2 Parameters below as of 16 Dec 2024 20:31  Patient On (Oxygen Delivery Method): nasal cannula  O2 Flow (L/min): 4    CAPILLARY BLOOD GLUCOSE      PHYSICAL EXAM:     · ENMT:   Airway patent,   Nasal mucosa clear.  Mouth with normal mucosa.   No thrush    · EYES:   Clear bilaterally,   pupils equal,   round and reactive to light.    · CARDIAC:   Normal rate,   regular rhythm.    Heart sounds S1, S2.   No murmurs, no rubs or gallops on auscultation  no edema        CAROTID:   normal systolic impulse  no bruits    · RESPIRATORY:   rales  normal chest expansion  no retractions or use of accessory muscles  percussion of chest demonstrates no hyperresonance or dullness    · GASTROINTESTINAL:  Abdomen soft,   non-tender,   + BS  liver/spleen not palpable    · SKIN:   Skin normal color for race,   warm, dry   No evidence of rash.    · HEME LYMPH:   no splenomegaly.  No cervical  lymphadenopathy.  no inguinal lymphadenopathy    HOSPITAL MEDICATIONS:  MEDICATIONS  (STANDING):  albuterol/ipratropium for Nebulization 3 milliLiter(s) Nebulizer every 6 hours  aspirin  chewable 81 milliGRAM(s) Oral daily  atorvastatin 40 milliGRAM(s) Oral at bedtime  chlorhexidine 2% Cloths 1 Application(s) Topical <User Schedule>  clopidogrel Tablet 75 milliGRAM(s) Oral daily  fluticasone propionate/ salmeterol 250-50 MICROgram(s) Diskus 1 Dose(s) Inhalation two times a day  hydrochlorothiazide 25 milliGRAM(s) Oral daily  influenza  Vaccine (HIGH DOSE) 0.5 milliLiter(s) IntraMuscular once  methylPREDNISolone sodium succinate Injectable 60 milliGRAM(s) IV Push every 12 hours  pantoprazole    Tablet 40 milliGRAM(s) Oral before breakfast    MEDICATIONS  (PRN):  acetaminophen     Tablet .. 650 milliGRAM(s) Oral every 6 hours PRN Temp greater or equal to 38C (100.4F), Mild Pain (1 - 3)  aluminum hydroxide/magnesium hydroxide/simethicone Suspension 30 milliLiter(s) Oral every 4 hours PRN Dyspepsia  guaifenesin/dextromethorphan Oral Liquid 10 milliLiter(s) Oral four times a day PRN Cough  melatonin 5 milliGRAM(s) Oral at bedtime PRN Insomnia  ondansetron Injectable 4 milliGRAM(s) IV Push every 8 hours PRN Nausea and/or Vomiting        LABS:                        11.4   18.45 )-----------( 450      ( 16 Dec 2024 06:10 )             37.8     12-16    137  |  91[L]  |  31[H]  ----------------------------<  190[H]  5.1[H]   |  35[H]  |  0.7    Ca    9.5      16 Dec 2024 06:10  Mg     2.3     12-16      PT/INR - ( 16 Dec 2024 12:16 )   PT: 11.00 sec;   INR: 0.93 ratio         PTT - ( 16 Dec 2024 12:16 )  PTT:24.9 sec  Urinalysis Basic - ( 16 Dec 2024 06:10 )    Color: x / Appearance: x / SG: x / pH: x  Gluc: 190 mg/dL / Ketone: x  / Bili: x / Urobili: x   Blood: x / Protein: x / Nitrite: x   Leuk Esterase: x / RBC: x / WBC x   Sq Epi: x / Non Sq Epi: x / Bacteria: x      SARS-CoV-2: NotDetec (10 Dec 2024 12:38)      RADIOLOGY: Today I personally interpreted the latest CXR and other pertinent films.               Patient is a 64y old  Female who presents with a chief complaint of SOB (16 Dec 2024 08:26)        HPI:  65 yo female PMHx of COPD (on home O2 5L) CAD s/p stents (on Plavix)  HTN presents to ER c/o shortness of breath x 1 week. pt states she had URI symptoms after Thanksgiving, she developed a productive cough with yellow sputum and worsening shortness of breath. Pt states today she had dyspnea only walking a few steps and was unable to catch her breath, no relief from rescue inhaler. In ER pt was placed on bipap, found to be acidotic and hypercapnic, also had elevated troponin, no EKG changes. ICU consulted and recommend admission to telemetry. Denies fever, chills, n/v/d, abdominal pain, dysuria.   (09 Dec 2024 23:00)      PAST MEDICAL & SURGICAL HISTORY:  HTN (hypertension)      Chronic obstructive pulmonary disease (COPD)      Hyperlipidemia      CAD (coronary artery disease)      S/P cholecystectomy      H/O tear of meniscus of knee joint          FAMILY HISTORY:  FH: myocardial infarction (Father)    Family history of valvular heart disease (Mother)          Pt evaluated on rounds.  I reviewed the radiology tests and hospital record.    I reviewed previous notes on this patient.    Interval Events: No overnight events.    REVIEW OF SYSTEMS:   see HPI      OBJECTIVE:  ICU Vital Signs Last 24 Hrs  T(C): 36.8 (17 Dec 2024 05:00), Max: 36.8 (17 Dec 2024 05:00)  T(F): 98.2 (17 Dec 2024 05:00), Max: 98.2 (17 Dec 2024 05:00)  HR: 66 (17 Dec 2024 05:00) (66 - 78)  BP: 127/67 (17 Dec 2024 05:00) (127/67 - 155/63)  RR: 18 (17 Dec 2024 05:00) (18 - 20)  SpO2: 98% (17 Dec 2024 05:00) (97% - 98%)    O2 Parameters below as of 16 Dec 2024 20:31  Patient On (Oxygen Delivery Method): nasal cannula  O2 Flow (L/min): 4    CAPILLARY BLOOD GLUCOSE      PHYSICAL EXAM:     · ENMT:   Airway patent,   Nasal mucosa clear.  Mouth with normal mucosa.   No thrush    · EYES:   Clear bilaterally,   pupils equal,   round and reactive to light.    · CARDIAC:   Normal rate,   regular rhythm.    Heart sounds S1, S2.   No murmurs, no rubs or gallops on auscultation  no edema        CAROTID:   normal systolic impulse  no bruits    · RESPIRATORY:   lying flat in bed no issues  lungs essentially clear  normal chest expansion  no retractions or use of accessory muscles  percussion of chest demonstrates no hyperresonance or dullness    · GASTROINTESTINAL:  Abdomen soft,   non-tender,   + BS  liver/spleen not palpable    · SKIN:   Skin normal color for race,   warm, dry   No evidence of rash.    · HEME LYMPH:   no splenomegaly.  No cervical  lymphadenopathy.  no inguinal lymphadenopathy    HOSPITAL MEDICATIONS:  MEDICATIONS  (STANDING):  albuterol/ipratropium for Nebulization 3 milliLiter(s) Nebulizer every 6 hours  aspirin  chewable 81 milliGRAM(s) Oral daily  atorvastatin 40 milliGRAM(s) Oral at bedtime  chlorhexidine 2% Cloths 1 Application(s) Topical <User Schedule>  clopidogrel Tablet 75 milliGRAM(s) Oral daily  fluticasone propionate/ salmeterol 250-50 MICROgram(s) Diskus 1 Dose(s) Inhalation two times a day  hydrochlorothiazide 25 milliGRAM(s) Oral daily  influenza  Vaccine (HIGH DOSE) 0.5 milliLiter(s) IntraMuscular once  methylPREDNISolone sodium succinate Injectable 60 milliGRAM(s) IV Push every 12 hours  pantoprazole    Tablet 40 milliGRAM(s) Oral before breakfast    MEDICATIONS  (PRN):  acetaminophen     Tablet .. 650 milliGRAM(s) Oral every 6 hours PRN Temp greater or equal to 38C (100.4F), Mild Pain (1 - 3)  aluminum hydroxide/magnesium hydroxide/simethicone Suspension 30 milliLiter(s) Oral every 4 hours PRN Dyspepsia  guaifenesin/dextromethorphan Oral Liquid 10 milliLiter(s) Oral four times a day PRN Cough  melatonin 5 milliGRAM(s) Oral at bedtime PRN Insomnia  ondansetron Injectable 4 milliGRAM(s) IV Push every 8 hours PRN Nausea and/or Vomiting        LABS:                        11.4   18.45 )-----------( 450      ( 16 Dec 2024 06:10 )             37.8     12-16    137  |  91[L]  |  31[H]  ----------------------------<  190[H]  5.1[H]   |  35[H]  |  0.7    Ca    9.5      16 Dec 2024 06:10  Mg     2.3     12-16      PT/INR - ( 16 Dec 2024 12:16 )   PT: 11.00 sec;   INR: 0.93 ratio         PTT - ( 16 Dec 2024 12:16 )  PTT:24.9 sec  Urinalysis Basic - ( 16 Dec 2024 06:10 )    Color: x / Appearance: x / SG: x / pH: x  Gluc: 190 mg/dL / Ketone: x  / Bili: x / Urobili: x   Blood: x / Protein: x / Nitrite: x   Leuk Esterase: x / RBC: x / WBC x   Sq Epi: x / Non Sq Epi: x / Bacteria: x      SARS-CoV-2: NotDetec (10 Dec 2024 12:38)      RADIOLOGY: Today I personally interpreted the latest CXR and other pertinent films.

## 2024-12-17 NOTE — PROGRESS NOTE ADULT - ASSESSMENT
63 yo female pmh CAD s/p stents (2022) at [facility] (on Plavix)  HTN, COPD (on home O2 5L) , recent URI  presents to ER c/o shortness of breath x 1 week.     Trop 157>154>102  ecg NSR IRBBB; no acute ischemic changes  ECHO 12/10/24: normal global lvsf EF 54% sev concentric lvh    Plan  # Troponin elevation  # Dyspnea   - breathing much improved, now on 4L  which is her baseline, lungs CTA, no wheezing  - Troponin peaked and downtrended no need to trend further   - sob similar to symptoms prior to her stents  - labs and imaging reviewed  - appreciate nephro recs, AVAP during the procedure under the direction of anesthesia.  - possible cath Wed at [facility], need confirm exact timing with Dr Stark   - npo from midnight except meds  - She uses dental device.   - prn O2 and neb tx  - pt aware and agreeable to cath 63 yo female pmh CAD s/p stents (2022) at [facility] (on Plavix)  HTN, COPD (on home O2 5L) , recent URI  presents to ER c/o shortness of breath x 1 week.     Trop 157>154>102  ecg NSR IRBBB; no acute ischemic changes  ECHO 12/10/24: normal global lvsf EF 54% sev concentric lvh    Plan  # Troponin elevation  # Dyspnea   - breathing much improved, now on 4L  which is her baseline, lungs CTA, no wheezing  - Troponin peaked and downtrended no need to trend further   - sob similar to symptoms prior to her stents  - labs and imaging reviewed  - appreciate pulmonary recs, AVAP during the procedure under the direction of anesthesia.  - possible cath Wed at [facility], need confirm exact timing with Dr Stark   - npo from midnight except meds  - She uses dental device.   - prn O2 and neb tx  - pt aware and agreeable to cath

## 2024-12-17 NOTE — PROGRESS NOTE ADULT - ASSESSMENT
63 yo female PMHx of COPD (on home O2 5L) CAD s/p stents (on Plavix)  HTN presents to ER c/o shortness of breath x 1 week.     # NSTEMI   # h/o CAD s/p stents      - cardiac monitoring   - trend troponin  +  - echo   - cardiology consult reviewed  - pt anticoagulated in ER  - c/w Plavix      # hypercapnic acidosis respiratory failure   # COPD exacerbation   # URI/cough   - supplemental O2, wean Bipap  - c/w solumedrol decr to daily then to prednisone taper   - c/w inhalers   - nebs prn   - LABA/ICS  - pulmonary consult reviewed     # h/o HTN  c/w HCTZ    # h/o HLD  c/w statin     # dvt ppx   # gi ppx   # full code

## 2024-12-17 NOTE — PROGRESS NOTE ADULT - SUBJECTIVE AND OBJECTIVE BOX
Subjective/Objective:     64 year old female who presents for shortness of breath    MEDICATIONS  (STANDING):  albuterol/ipratropium for Nebulization 3 milliLiter(s) Nebulizer every 6 hours  aspirin  chewable 81 milliGRAM(s) Oral daily  atorvastatin 40 milliGRAM(s) Oral at bedtime  chlorhexidine 2% Cloths 1 Application(s) Topical <User Schedule>  clopidogrel Tablet 75 milliGRAM(s) Oral daily  fluticasone propionate/ salmeterol 250-50 MICROgram(s) Diskus 1 Dose(s) Inhalation two times a day  hydrochlorothiazide 25 milliGRAM(s) Oral daily  influenza  Vaccine (HIGH DOSE) 0.5 milliLiter(s) IntraMuscular once  methylPREDNISolone sodium succinate Injectable 60 milliGRAM(s) IV Push every 12 hours  pantoprazole    Tablet 40 milliGRAM(s) Oral before breakfast    MEDICATIONS  (PRN):  acetaminophen     Tablet .. 650 milliGRAM(s) Oral every 6 hours PRN Temp greater or equal to 38C (100.4F), Mild Pain (1 - 3)  aluminum hydroxide/magnesium hydroxide/simethicone Suspension 30 milliLiter(s) Oral every 4 hours PRN Dyspepsia  guaifenesin/dextromethorphan Oral Liquid 10 milliLiter(s) Oral four times a day PRN Cough  melatonin 5 milliGRAM(s) Oral at bedtime PRN Insomnia  ondansetron Injectable 4 milliGRAM(s) IV Push every 8 hours PRN Nausea and/or Vomiting          Vital Signs Last 24 Hrs  T(C): 36.8 (17 Dec 2024 05:00), Max: 36.8 (17 Dec 2024 05:00)  T(F): 98.2 (17 Dec 2024 05:00), Max: 98.2 (17 Dec 2024 05:00)  HR: 66 (17 Dec 2024 05:00) (66 - 78)  BP: 127/67 (17 Dec 2024 05:00) (127/67 - 155/63)  BP(mean): --  RR: 18 (17 Dec 2024 05:00) (18 - 20)  SpO2: 98% (17 Dec 2024 05:00) (97% - 98%)    Parameters below as of 16 Dec 2024 20:31  Patient On (Oxygen Delivery Method): nasal cannula  O2 Flow (L/min): 4    I&O's Detail        GENERAL:  65y/o Female NAD, resting comfortably.  HEAD:  Atraumatic, Normocephalic  EYES: EOMI, PERRLA, conjunctiva and sclera clear  NECK: Supple, No JVD, no cervical lymphadenopathy, non-tender  CHEST/LUNG: Clear to auscultation bilaterally; No wheeze, rhonchi, or rales  HEART: Regular rate and rhythm; S1&S2  ABDOMEN: Soft, Nontender, Nondistended x 4 quadrants; Bowel sounds present  EXTREMITIES:   Peripheral Pulses Present, No clubbing, no cyanosis, or no edema, no calf tenderness  PSYCH: AAOx3, cooperative, appropriate  NEUROLOGY: WNL  SKIN: WNL      EKG/ TELEM:    LABS:                          11.4   18.45 )-----------( 450      ( 16 Dec 2024 06:10 )             37.8     PT/INR - ( 16 Dec 2024 12:16 )   PT: 11.00 sec;   INR: 0.93 ratio         PTT - ( 16 Dec 2024 12:16 )  PTT:24.9 sec  17 Dec 2024 06:25    136    |  91[L]  |  28[H]  ----------------------------<  214[H]  4.6     |  36[H]  |  0.6[L]  16 Dec 2024 06:10    137    |  91[L]  |  31[H]  ----------------------------<  190[H]  5.1[H]   |  35[H]  |  0.7      Ca    9.3        17 Dec 2024 06:25  Ca    9.5        16 Dec 2024 06:10  Mg     2.3       16 Dec 2024 06:10                        Diagnostic testing:  < from: TTE Echo Complete w/o Contrast w/ Doppler (12.10.24 @ 12:49) >    Summary:   1. Technically limited study.   2. Normal global left ventricular systolic function.   3. LV Ejection Fraction by Omalley's Method with a biplane EF of 54 %.   4. LV apex not well visualized.   5. Severe concentric left ventricular hypertrophy.   6. The left ventricular diastolic function could not be assessed in this   study.   7. Normal left atrial size.   8. Normal right atrial size.   9. Mild aortic valve stenosis.  10. Mild thickening and calcification of the posterior mitral valve   leaflet.  11. Mild to moderate mitral annular calcification.  12. Trace mitral valve regurgitation.  13. Adequate TR velocity was not obtained to accurately assess RVSP.  14. There is no evidence of pericardial effusion.    PHYSICIAN INTERPRETATION:  Left Ventricle: The left ventricular internal cavity size is normal.   There is severe concentric left ventricular hypertrophy. Global LV   systolic function was normal. Normal segmental left ventricular systolic   function. The left ventricular diastolic function could not be assessed   in this study.  LV apex not well visualized.  Right Ventricle: The right ventricle was not well visualized. RV systolic   function is normal.  Left Atrium: Normal left atrial size.  Right Atrium: Normal right atrial size.  Pericardium: There is no evidence of pericardial effusion.  Mitral Valve: Mild thickening and calcification of the posterior mitral   valve leaflet. There is mild to moderate mitral annular calcification. No   evidence of mitral stenosis. Trace mitral valve regurgitation is seen.  Tricuspid Valve: Structurally normal tricuspid valve, with normal leaflet   excursion. No tricuspid regurgitation is visualized. Adequate TR velocity   was not obtained to accurately assess RVSP.  Aortic Valve: The aortic valve is trileaflet. Mild aortic stenosis is   present. No evidence of aortic valve regurgitation is seen.  Pulmonic Valve: The pulmonic valve was not well visualized. No indication   of pulmonic valve regurgitation.  Aorta: The aortic root and ascending aorta are structurallynormal, with   no evidence of dilitation.  Venous: The inferior vena cava is abnormal. The inferior vena cava was   dilated, with respiratory size variation greater than 50%.    < end of copied text >        Assessment and Plan:

## 2024-12-17 NOTE — PROGRESS NOTE ADULT - SUBJECTIVE AND OBJECTIVE BOX
63 yo female PMHx of COPD (on home O2 5L) CAD s/p stents (on Plavix)  HTN  shortness of breath    lying in bed off of BIPAP    PAST MEDICAL & SURGICAL HISTORY:    HTN (hypertension)  Chronic obstructive pulmonary disease (COPD)  Hyperlipidemia  CAD (coronary artery disease)  S/P cholecystectomy  H/O tear of meniscus of knee joint    Social History:  smoker x 25 years, quit 3 years ago (09 Dec 2024 23:00)    MEDICATIONS  (STANDING):  albuterol/ipratropium for Nebulization 3 milliLiter(s) Nebulizer every 6 hours  aspirin  chewable 81 milliGRAM(s) Oral daily  atorvastatin 40 milliGRAM(s) Oral at bedtime  chlorhexidine 2% Cloths 1 Application(s) Topical <User Schedule>  clopidogrel Tablet 75 milliGRAM(s) Oral daily  fluticasone propionate/ salmeterol 250-50 MICROgram(s) Diskus 1 Dose(s) Inhalation two times a day  hydrochlorothiazide 25 milliGRAM(s) Oral daily  influenza  Vaccine (HIGH DOSE) 0.5 milliLiter(s) IntraMuscular once  methylPREDNISolone sodium succinate Injectable 60 milliGRAM(s) IV Push every 12 hours  pantoprazole    Tablet 40 milliGRAM(s) Oral before breakfast    MEDICATIONS  (PRN):  acetaminophen     Tablet .. 650 milliGRAM(s) Oral every 6 hours PRN Temp greater or equal to 38C (100.4F), Mild Pain (1 - 3)  aluminum hydroxide/magnesium hydroxide/simethicone Suspension 30 milliLiter(s) Oral every 4 hours PRN Dyspepsia  guaifenesin/dextromethorphan Oral Liquid 10 milliLiter(s) Oral four times a day PRN Cough  melatonin 5 milliGRAM(s) Oral at bedtime PRN Insomnia  ondansetron Injectable 4 milliGRAM(s) IV Push every 8 hours PRN Nausea and/or Vomiting        Allergies    No Known Allergies    Intolerances    Vital Signs Last 24 Hrs  T(C): 36.8 (17 Dec 2024 05:00), Max: 36.8 (17 Dec 2024 05:00)  T(F): 98.2 (17 Dec 2024 05:00), Max: 98.2 (17 Dec 2024 05:00)  HR: 66 (17 Dec 2024 05:00) (66 - 78)  BP: 127/67 (17 Dec 2024 05:00) (127/67 - 155/63)  BP(mean): --  RR: 18 (17 Dec 2024 05:00) (18 - 20)  SpO2: 98% (17 Dec 2024 05:00) (97% - 98%)    Parameters below as of 16 Dec 2024 20:31  Patient On (Oxygen Delivery Method): nasal cannula  O2 Flow (L/min): 4      Constitutional: NAD   HEENT: Airway patent, moist MM, no erythema/swelling/deformity of oral structures. EOMI, PERRLA.  CV: regular rate, regular rhythm, well-perfused extremities, 2+ b/l DP and radial pulses equal.  Lungs: bilateral air entry,  no rales, rhonchi   ABD: NTND, no guarding or rebound, no pulsatile mass, no hernias.   MSK: Neck supple, nontender, nl ROM, no stepoff. Chest nontender. Back nontender in TLS spine or to b/l bony structures or flanks. Ext nontender, nl rom, no deformity.   INTEG: Skin warm, dry, no rash.  NEURO: A&Ox3, normal strength, nl sensation throughout, normal speech.   PSYCH: Denies SI/HI/hallucinations    LAB:                           11.4   18.45 )-----------( 450      ( 16 Dec 2024 06:10 )             37.8   12-16    137  |  91[L]  |  31[H]  ----------------------------<  190[H]  5.1[H]   |  35[H]  |  0.7    Ca    9.5      16 Dec 2024 06:10  Mg     2.3     12-16

## 2024-12-17 NOTE — PROGRESS NOTE ADULT - ASSESSMENT
Impression:  Acute/chronic respiratory failure due to COPD with exacerbation  hypoxia  Currently no Impending respiratory failure  SHANKAR/OHVS  no pneumonia possible viral syndrome    Suggest:  Check O2 sat on NC, record, continue O2 as necessary to maintain sats > 90%  albuterol/ipratropium for Nebulization 3 milliLiter(s) Nebulizer every 6 hours  methylPREDNISolone sodium succinate Injectable start taper   AVAPS hs/PRN.  , EPAP 8, min IPAP 14, max IPAP 25. O2 to keep Karma@ >92%.  OOB to chair  GI and DVT prophylaxis  pulmonary toilet to prevent aspirations  off loading of skin to prevent pressure ulcers  Monitor clinically, convert to oral prednisone as clinical improvement allows  Upon D/C the patient will need ICS/LABA, LAMA, PRN albuterol, finish abx, slow taper of steroids ie. start Prednisone 40 mg and taper 10 mg Q4d.  will need OP NPSG    would hold off on cardiac cath until at least Wednesday       Impression:  Acute/chronic respiratory failure due to COPD with exacerbation  hypoxia  Currently no Impending respiratory failure  SHANKAR/OHVS  no pneumonia possible viral syndrome    Suggest:  Check O2 sat on NC, record, continue O2 as necessary to maintain sats > 90%  albuterol/ipratropium for Nebulization 3 milliLiter(s) Nebulizer every 6 hours  methylPREDNISolone sodium succinate Injectable start taper   AVAPS hs/PRN.  , EPAP 8, min IPAP 14, max IPAP 25. O2 to keep Karma@ >92%.  OOB to chair  GI and DVT prophylaxis  pulmonary toilet to prevent aspirations  off loading of skin to prevent pressure ulcers  Monitor clinically, convert to oral prednisone as clinical improvement allows  Upon D/C the patient will need ICS/LABA, LAMA, PRN albuterol, finish abx, slow taper of steroids ie. start Prednisone 40 mg and taper 10 mg Q4d.  will need OP NPSG    Pt at low risk for cardiac cath Wednesday. I would use the AVAP during the procedure under the direction of anesthesia.

## 2024-12-18 LAB
ALBUMIN SERPL ELPH-MCNC: 4 G/DL — SIGNIFICANT CHANGE UP (ref 3.5–5.2)
ALP SERPL-CCNC: 67 U/L — SIGNIFICANT CHANGE UP (ref 30–115)
ALT FLD-CCNC: 19 U/L — SIGNIFICANT CHANGE UP (ref 0–41)
ANION GAP SERPL CALC-SCNC: 10 MMOL/L — SIGNIFICANT CHANGE UP (ref 7–14)
APTT BLD: 24.5 SEC — LOW (ref 27–39.2)
AST SERPL-CCNC: 11 U/L — SIGNIFICANT CHANGE UP (ref 0–41)
BILIRUB SERPL-MCNC: 0.2 MG/DL — SIGNIFICANT CHANGE UP (ref 0.2–1.2)
BUN SERPL-MCNC: 33 MG/DL — HIGH (ref 10–20)
CALCIUM SERPL-MCNC: 9.2 MG/DL — SIGNIFICANT CHANGE UP (ref 8.4–10.5)
CHLORIDE SERPL-SCNC: 94 MMOL/L — LOW (ref 98–110)
CO2 SERPL-SCNC: 33 MMOL/L — HIGH (ref 17–32)
CREAT SERPL-MCNC: 0.6 MG/DL — LOW (ref 0.7–1.5)
EGFR: 99 ML/MIN/1.73M2 — SIGNIFICANT CHANGE UP
GLUCOSE SERPL-MCNC: 190 MG/DL — HIGH (ref 70–99)
HCT VFR BLD CALC: 38 % — SIGNIFICANT CHANGE UP (ref 37–47)
HGB BLD-MCNC: 11.6 G/DL — LOW (ref 12–16)
INR BLD: 0.89 RATIO — SIGNIFICANT CHANGE UP (ref 0.65–1.3)
MCHC RBC-ENTMCNC: 26.9 PG — LOW (ref 27–31)
MCHC RBC-ENTMCNC: 30.5 G/DL — LOW (ref 32–37)
MCV RBC AUTO: 88.2 FL — SIGNIFICANT CHANGE UP (ref 81–99)
NRBC # BLD: 0 /100 WBCS — SIGNIFICANT CHANGE UP (ref 0–0)
PLATELET # BLD AUTO: 385 K/UL — SIGNIFICANT CHANGE UP (ref 130–400)
PMV BLD: 10.4 FL — SIGNIFICANT CHANGE UP (ref 7.4–10.4)
POTASSIUM SERPL-MCNC: 5.4 MMOL/L — HIGH (ref 3.5–5)
POTASSIUM SERPL-SCNC: 5.4 MMOL/L — HIGH (ref 3.5–5)
PROT SERPL-MCNC: 5.9 G/DL — LOW (ref 6–8)
PROTHROM AB SERPL-ACNC: 10.5 SEC — SIGNIFICANT CHANGE UP (ref 9.95–12.87)
RBC # BLD: 4.31 M/UL — SIGNIFICANT CHANGE UP (ref 4.2–5.4)
RBC # FLD: 14.4 % — SIGNIFICANT CHANGE UP (ref 11.5–14.5)
SODIUM SERPL-SCNC: 137 MMOL/L — SIGNIFICANT CHANGE UP (ref 135–146)
WBC # BLD: 15.18 K/UL — HIGH (ref 4.8–10.8)
WBC # FLD AUTO: 15.18 K/UL — HIGH (ref 4.8–10.8)

## 2024-12-18 PROCEDURE — 99232 SBSQ HOSP IP/OBS MODERATE 35: CPT

## 2024-12-18 RX ORDER — SODIUM ZIRCONIUM CYCLOSILICATE 5 G/5G
10 POWDER, FOR SUSPENSION ORAL ONCE
Refills: 0 | Status: COMPLETED | OUTPATIENT
Start: 2024-12-18 | End: 2024-12-18

## 2024-12-18 RX ORDER — ENOXAPARIN SODIUM 30 MG/.3ML
40 INJECTION SUBCUTANEOUS EVERY 24 HOURS
Refills: 0 | Status: DISCONTINUED | OUTPATIENT
Start: 2024-12-18 | End: 2024-12-21

## 2024-12-18 RX ADMIN — FLUTICASONE PROPIONATE AND SALMETEROL XINAFOATE 1 DOSE(S): 45; 21 AEROSOL, METERED RESPIRATORY (INHALATION) at 21:13

## 2024-12-18 RX ADMIN — Medication 40 MILLIGRAM(S): at 21:08

## 2024-12-18 RX ADMIN — Medication 60 MILLIGRAM(S): at 17:31

## 2024-12-18 RX ADMIN — Medication 81 MILLIGRAM(S): at 12:21

## 2024-12-18 RX ADMIN — IPRATROPIUM BROMIDE AND ALBUTEROL SULFATE 3 MILLILITER(S): 2.5; .5 SOLUTION RESPIRATORY (INHALATION) at 19:08

## 2024-12-18 RX ADMIN — SODIUM ZIRCONIUM CYCLOSILICATE 10 GRAM(S): 5 POWDER, FOR SUSPENSION ORAL at 11:21

## 2024-12-18 RX ADMIN — IPRATROPIUM BROMIDE AND ALBUTEROL SULFATE 3 MILLILITER(S): 2.5; .5 SOLUTION RESPIRATORY (INHALATION) at 07:58

## 2024-12-18 RX ADMIN — CLOPIDOGREL 75 MILLIGRAM(S): 75 TABLET, FILM COATED ORAL at 12:21

## 2024-12-18 RX ADMIN — ACETAMINOPHEN 500MG 650 MILLIGRAM(S): 500 TABLET, COATED ORAL at 20:37

## 2024-12-18 RX ADMIN — ACETAMINOPHEN 500MG 650 MILLIGRAM(S): 500 TABLET, COATED ORAL at 19:57

## 2024-12-18 RX ADMIN — ACETAMINOPHEN, DIPHENHYDRAMINE HCL, PHENYLEPHRINE HCL 5 MILLIGRAM(S): 325; 25; 5 TABLET ORAL at 21:07

## 2024-12-18 RX ADMIN — IPRATROPIUM BROMIDE AND ALBUTEROL SULFATE 3 MILLILITER(S): 2.5; .5 SOLUTION RESPIRATORY (INHALATION) at 02:16

## 2024-12-18 RX ADMIN — FLUTICASONE PROPIONATE AND SALMETEROL XINAFOATE 1 DOSE(S): 45; 21 AEROSOL, METERED RESPIRATORY (INHALATION) at 09:23

## 2024-12-18 RX ADMIN — Medication 60 MILLIGRAM(S): at 05:26

## 2024-12-18 NOTE — PROGRESS NOTE ADULT - SUBJECTIVE AND OBJECTIVE BOX
63 yo female PMHx of COPD (on home O2 5L) CAD s/p stents (on Plavix)  HTN  shortness of breath    lying in bed off of BIPAP    PAST MEDICAL & SURGICAL HISTORY:    HTN (hypertension)  Chronic obstructive pulmonary disease (COPD)  Hyperlipidemia  CAD (coronary artery disease)  S/P cholecystectomy  H/O tear of meniscus of knee joint    Social History:  smoker x 25 years, quit 3 years ago (09 Dec 2024 23:00)    MEDICATIONS  (STANDING):  albuterol/ipratropium for Nebulization 3 milliLiter(s) Nebulizer every 6 hours  aspirin  chewable 81 milliGRAM(s) Oral daily  atorvastatin 40 milliGRAM(s) Oral at bedtime  chlorhexidine 2% Cloths 1 Application(s) Topical <User Schedule>  clopidogrel Tablet 75 milliGRAM(s) Oral daily  fluticasone propionate/ salmeterol 250-50 MICROgram(s) Diskus 1 Dose(s) Inhalation two times a day  hydrochlorothiazide 25 milliGRAM(s) Oral daily  influenza  Vaccine (HIGH DOSE) 0.5 milliLiter(s) IntraMuscular once  methylPREDNISolone sodium succinate Injectable 60 milliGRAM(s) IV Push every 12 hours  pantoprazole    Tablet 40 milliGRAM(s) Oral before breakfast    MEDICATIONS  (PRN):  acetaminophen     Tablet .. 650 milliGRAM(s) Oral every 6 hours PRN Temp greater or equal to 38C (100.4F), Mild Pain (1 - 3)  aluminum hydroxide/magnesium hydroxide/simethicone Suspension 30 milliLiter(s) Oral every 4 hours PRN Dyspepsia  guaifenesin/dextromethorphan Oral Liquid 10 milliLiter(s) Oral four times a day PRN Cough  melatonin 5 milliGRAM(s) Oral at bedtime PRN Insomnia  ondansetron Injectable 4 milliGRAM(s) IV Push every 8 hours PRN Nausea and/or Vomiting        Allergies    No Known Allergies    Intolerances    Vital Signs Last 24 Hrs  T(C): 36.7 (18 Dec 2024 05:00), Max: 37.8 (17 Dec 2024 13:35)  T(F): 98 (18 Dec 2024 05:00), Max: 100 (17 Dec 2024 13:35)  HR: 63 (18 Dec 2024 05:00) (63 - 75)  BP: 137/59 (18 Dec 2024 05:00) (122/77 - 144/66)  BP(mean): --  RR: 18 (18 Dec 2024 05:00) (16 - 18)  SpO2: 99% (18 Dec 2024 05:00) (96% - 99%)    Parameters below as of 17 Dec 2024 20:00  Patient On (Oxygen Delivery Method): nasal cannula  O2 Flow (L/min): 4          Constitutional: NAD   HEENT: Airway patent, moist MM, no erythema/swelling/deformity of oral structures. EOMI, PERRLA.  CV: regular rate, regular rhythm, well-perfused extremities, 2+ b/l DP and radial pulses equal.  Lungs: bilateral air entry,  no rales, rhonchi   ABD: NTND, no guarding or rebound, no pulsatile mass, no hernias.   MSK: Neck supple, nontender, nl ROM, no stepoff. Chest nontender. Back nontender in TLS spine or to b/l bony structures or flanks. Ext nontender, nl rom, no deformity.   INTEG: Skin warm, dry, no rash.  NEURO: A&Ox3, normal strength, nl sensation throughout, normal speech.   PSYCH: Denies SI/HI/hallucinations    LAB:                           11.4   18.45 )-----------( 450      ( 16 Dec 2024 06:10 )             37.8   12-16    137  |  91[L]  |  31[H]  ----------------------------<  190[H]  5.1[H]   |  35[H]  |  0.7    Ca    9.5      16 Dec 2024 06:10  Mg     2.3     12-16

## 2024-12-18 NOTE — PROGRESS NOTE ADULT - SUBJECTIVE AND OBJECTIVE BOX
Subjective/Objective:         MEDICATIONS  (STANDING):  albuterol/ipratropium for Nebulization 3 milliLiter(s) Nebulizer every 6 hours  aspirin  chewable 81 milliGRAM(s) Oral daily  atorvastatin 40 milliGRAM(s) Oral at bedtime  chlorhexidine 2% Cloths 1 Application(s) Topical <User Schedule>  clopidogrel Tablet 75 milliGRAM(s) Oral daily  enoxaparin Injectable 40 milliGRAM(s) SubCutaneous every 24 hours  fluticasone propionate/ salmeterol 250-50 MICROgram(s) Diskus 1 Dose(s) Inhalation two times a day  hydrochlorothiazide 25 milliGRAM(s) Oral daily  influenza  Vaccine (HIGH DOSE) 0.5 milliLiter(s) IntraMuscular once  methylPREDNISolone sodium succinate Injectable 60 milliGRAM(s) IV Push every 12 hours  pantoprazole    Tablet 40 milliGRAM(s) Oral before breakfast    MEDICATIONS  (PRN):  acetaminophen     Tablet .. 650 milliGRAM(s) Oral every 6 hours PRN Temp greater or equal to 38C (100.4F), Mild Pain (1 - 3)  aluminum hydroxide/magnesium hydroxide/simethicone Suspension 30 milliLiter(s) Oral every 4 hours PRN Dyspepsia  guaifenesin/dextromethorphan Oral Liquid 10 milliLiter(s) Oral four times a day PRN Cough  melatonin 5 milliGRAM(s) Oral at bedtime PRN Insomnia  ondansetron Injectable 4 milliGRAM(s) IV Push every 8 hours PRN Nausea and/or Vomiting          Vital Signs Last 24 Hrs  T(C): 36.7 (18 Dec 2024 05:00), Max: 37.8 (17 Dec 2024 13:35)  T(F): 98 (18 Dec 2024 05:00), Max: 100 (17 Dec 2024 13:35)  HR: 63 (18 Dec 2024 05:00) (63 - 75)  BP: 137/59 (18 Dec 2024 05:00) (122/77 - 144/66)  BP(mean): --  RR: 18 (18 Dec 2024 05:00) (16 - 18)  SpO2: 99% (18 Dec 2024 05:00) (96% - 99%)    Parameters below as of 17 Dec 2024 20:00  Patient On (Oxygen Delivery Method): nasal cannula  O2 Flow (L/min): 4    I&O's Detail          GENERAL:  63y/o Female NAD, resting comfortably.  HEAD:  Atraumatic, Normocephalic  EYES: EOMI, PERRLA, conjunctiva and sclera clear  NECK: Supple, No JVD, no cervical lymphadenopathy, non-tender  CHEST/LUNG: Clear to auscultation bilaterally; No wheeze, rhonchi, or rales  HEART: Regular rate and rhythm; S1&S2  ABDOMEN: Soft, Nontender, Nondistended x 4 quadrants; Bowel sounds present  EXTREMITIES:   Peripheral Pulses Present, No clubbing, no cyanosis, or no edema, no calf tenderness  PSYCH: AAOx3, cooperative, appropriate  NEUROLOGY: WNL  SKIN: WNL        EKG/ TELEM:    LABS:                          11.6   15.18 )-----------( 385      ( 18 Dec 2024 07:26 )             38.0     PT/INR - ( 18 Dec 2024 07:26 )   PT: 10.50 sec;   INR: 0.89 ratio         PTT - ( 18 Dec 2024 07:26 )  PTT:24.5 sec  18 Dec 2024 07:26    137    |  94[L]  |  33[H]  ----------------------------<  190[H]  5.4[H]   |  33[H]  |  0.6[L]  17 Dec 2024 06:25    136    |  91[L]  |  28[H]  ----------------------------<  214[H]  4.6     |  36[H]  |  0.6[L]    Ca    9.2        18 Dec 2024 07:26  Ca    9.3        17 Dec 2024 06:25    TPro  5.9[L]  /  Alb  4.0    /  TBili  0.2    /  DBili  x      /  AST  11     /  ALT  19     /  AlkPhos  67     18 Dec 2024 07:26                      Diagnostic testing:  < from: TTE Echo Complete w/o Contrast w/ Doppler (12.10.24 @ 12:49) >    Summary:   1. Technically limited study.   2. Normal global left ventricular systolic function.   3. LV Ejection Fraction by Omalley's Method with a biplane EF of 54 %.   4. LV apex not well visualized.   5. Severe concentric left ventricular hypertrophy.   6. The left ventricular diastolic function could not be assessed in this   study.   7. Normal left atrial size.   8. Normal right atrial size.   9. Mild aortic valve stenosis.  10. Mild thickening and calcification of the posterior mitral valve   leaflet.  11. Mild to moderate mitral annular calcification.  12. Trace mitral valve regurgitation.  13. Adequate TR velocity was not obtained to accurately assess RVSP.  14. There is no evidence of pericardial effusion.    PHYSICIAN INTERPRETATION:  Left Ventricle: The left ventricular internal cavity size is normal.   There is severe concentric left ventricular hypertrophy. Global LV   systolic function was normal. Normal segmental left ventricular systolic   function. The left ventricular diastolic function could not be assessed   in this study.  LV apex not well visualized.  Right Ventricle: The right ventricle was not well visualized. RV systolic   function is normal.  Left Atrium: Normal left atrial size.  Right Atrium: Normal right atrial size.  Pericardium: There is no evidence of pericardial effusion.  Mitral Valve: Mild thickening and calcification of the posterior mitral   valve leaflet. There is mild to moderate mitral annular calcification. No   evidence of mitral stenosis. Trace mitral valve regurgitation is seen.  Tricuspid Valve: Structurally normal tricuspid valve, with normal leaflet   excursion. No tricuspid regurgitation is visualized. Adequate TR velocity   was not obtained to accurately assess RVSP.  Aortic Valve: The aortic valve is trileaflet. Mild aortic stenosis is   present. No evidence of aortic valve regurgitation is seen.  Pulmonic Valve: The pulmonic valve was not well visualized. No indication   of pulmonic valve regurgitation.  Aorta: The aortic root and ascending aorta are structurallynormal, with   no evidence of dilitation.  Venous: The inferior vena cava is abnormal. The inferior vena cava was   dilated, with respiratory size variation greater than 50%.    < end of copied text >          Assessment and Plan:

## 2024-12-18 NOTE — PROGRESS NOTE ADULT - ASSESSMENT
65 yo female pmh CAD s/p stents (2022) at [facility] (on Plavix)  HTN, COPD (on home O2 5L) , recent URI  presents to ER c/o shortness of breath x 1 week.     Trop 157>154>102  ecg NSR IRBBB; no acute ischemic changes  ECHO 12/10/24: normal global lvsf EF 54% sev concentric lvh    Plan  # Troponin elevation  # Dyspnea   - breathing much improved, now on 4L  which is her baseline, lungs CTA, no wheezing  - Troponin peaked and downtrended no need to trend further   - sob similar to symptoms prior to her stents  - labs and imaging reviewed  - appreciate pulmonary recs, AVAP during the procedure under the direction of anesthesia.  - discussed with Dr swan. Plan is for inpatient C on Fri 12/20. He wants the patient transferred to the [place] on Thur 12/19 in readiness for cath  - npo from midnight Thur except meds   - She uses dental device.   - prn O2 and neb tx  - pt aware and agreeable to cath    Discussed with Primary Team

## 2024-12-18 NOTE — CHART NOTE - NSCHARTNOTEFT_GEN_A_CORE
Patient seen and examined throughout the course of the day.    -pt reports her breathing is better,  -Per cardio , pt going for cardiac cath on Fiday, needs NPO after midnight THURSDAY Night   -K 5.4 , will give 10 g Lokelma , recheck K level in Am     All patient's/family questions answered.  Patient and family encouraged to contact PA with any further issues. Patient seen and examined throughout the course of the day.    -pt reports her breathing is better,  -Per cardio , pt going for cardiac cath on Friday, needs NPO after midnight THURSDAY Night , pt needs to be transferred [place] tomorrow per Dr Stark request   -K 5.4 , will give 10 g Lokelma , recheck K level in Am   All patient's/family questions answered.  Patient and family encouraged to contact PA with any further issues.

## 2024-12-19 LAB
ALBUMIN SERPL ELPH-MCNC: 4.1 G/DL — SIGNIFICANT CHANGE UP (ref 3.5–5.2)
ALP SERPL-CCNC: 69 U/L — SIGNIFICANT CHANGE UP (ref 30–115)
ALT FLD-CCNC: 20 U/L — SIGNIFICANT CHANGE UP (ref 0–41)
ANION GAP SERPL CALC-SCNC: 9 MMOL/L — SIGNIFICANT CHANGE UP (ref 7–14)
AST SERPL-CCNC: 13 U/L — SIGNIFICANT CHANGE UP (ref 0–41)
BASOPHILS # BLD AUTO: 0.03 K/UL — SIGNIFICANT CHANGE UP (ref 0–0.2)
BASOPHILS NFR BLD AUTO: 0.2 % — SIGNIFICANT CHANGE UP (ref 0–1)
BILIRUB SERPL-MCNC: 0.4 MG/DL — SIGNIFICANT CHANGE UP (ref 0.2–1.2)
BUN SERPL-MCNC: 29 MG/DL — HIGH (ref 10–20)
CALCIUM SERPL-MCNC: 10 MG/DL — SIGNIFICANT CHANGE UP (ref 8.4–10.5)
CHLORIDE SERPL-SCNC: 94 MMOL/L — LOW (ref 98–110)
CO2 SERPL-SCNC: 36 MMOL/L — HIGH (ref 17–32)
CREAT SERPL-MCNC: 0.7 MG/DL — SIGNIFICANT CHANGE UP (ref 0.7–1.5)
EGFR: 95 ML/MIN/1.73M2 — SIGNIFICANT CHANGE UP
EOSINOPHIL # BLD AUTO: 0 K/UL — SIGNIFICANT CHANGE UP (ref 0–0.7)
EOSINOPHIL NFR BLD AUTO: 0 % — SIGNIFICANT CHANGE UP (ref 0–8)
GLUCOSE SERPL-MCNC: 224 MG/DL — HIGH (ref 70–99)
HCT VFR BLD CALC: 38.5 % — SIGNIFICANT CHANGE UP (ref 37–47)
HGB BLD-MCNC: 12.2 G/DL — SIGNIFICANT CHANGE UP (ref 12–16)
IMM GRANULOCYTES NFR BLD AUTO: 4 % — HIGH (ref 0.1–0.3)
LYMPHOCYTES # BLD AUTO: 1.27 K/UL — SIGNIFICANT CHANGE UP (ref 1.2–3.4)
LYMPHOCYTES # BLD AUTO: 9 % — LOW (ref 20.5–51.1)
MCHC RBC-ENTMCNC: 27.2 PG — SIGNIFICANT CHANGE UP (ref 27–31)
MCHC RBC-ENTMCNC: 31.7 G/DL — LOW (ref 32–37)
MCV RBC AUTO: 85.9 FL — SIGNIFICANT CHANGE UP (ref 81–99)
MONOCYTES # BLD AUTO: 0.57 K/UL — SIGNIFICANT CHANGE UP (ref 0.1–0.6)
MONOCYTES NFR BLD AUTO: 4.1 % — SIGNIFICANT CHANGE UP (ref 1.7–9.3)
NEUTROPHILS # BLD AUTO: 11.61 K/UL — HIGH (ref 1.4–6.5)
NEUTROPHILS NFR BLD AUTO: 82.7 % — HIGH (ref 42.2–75.2)
NRBC # BLD: 0 /100 WBCS — SIGNIFICANT CHANGE UP (ref 0–0)
PLATELET # BLD AUTO: 353 K/UL — SIGNIFICANT CHANGE UP (ref 130–400)
PMV BLD: 10.2 FL — SIGNIFICANT CHANGE UP (ref 7.4–10.4)
POTASSIUM SERPL-MCNC: 4.7 MMOL/L — SIGNIFICANT CHANGE UP (ref 3.5–5)
POTASSIUM SERPL-SCNC: 4.7 MMOL/L — SIGNIFICANT CHANGE UP (ref 3.5–5)
PROT SERPL-MCNC: 6.1 G/DL — SIGNIFICANT CHANGE UP (ref 6–8)
RBC # BLD: 4.48 M/UL — SIGNIFICANT CHANGE UP (ref 4.2–5.4)
RBC # FLD: 14.5 % — SIGNIFICANT CHANGE UP (ref 11.5–14.5)
SODIUM SERPL-SCNC: 139 MMOL/L — SIGNIFICANT CHANGE UP (ref 135–146)
WBC # BLD: 14.04 K/UL — HIGH (ref 4.8–10.8)
WBC # FLD AUTO: 14.04 K/UL — HIGH (ref 4.8–10.8)

## 2024-12-19 PROCEDURE — 99232 SBSQ HOSP IP/OBS MODERATE 35: CPT

## 2024-12-19 RX ADMIN — IPRATROPIUM BROMIDE AND ALBUTEROL SULFATE 3 MILLILITER(S): 2.5; .5 SOLUTION RESPIRATORY (INHALATION) at 20:35

## 2024-12-19 RX ADMIN — IPRATROPIUM BROMIDE AND ALBUTEROL SULFATE 3 MILLILITER(S): 2.5; .5 SOLUTION RESPIRATORY (INHALATION) at 08:00

## 2024-12-19 RX ADMIN — ENOXAPARIN SODIUM 40 MILLIGRAM(S): 30 INJECTION SUBCUTANEOUS at 05:12

## 2024-12-19 RX ADMIN — PANTOPRAZOLE SODIUM 40 MILLIGRAM(S): 40 TABLET, DELAYED RELEASE ORAL at 05:09

## 2024-12-19 RX ADMIN — Medication 60 MILLIGRAM(S): at 05:09

## 2024-12-19 RX ADMIN — CLOPIDOGREL 75 MILLIGRAM(S): 75 TABLET, FILM COATED ORAL at 11:30

## 2024-12-19 RX ADMIN — Medication 81 MILLIGRAM(S): at 11:31

## 2024-12-19 RX ADMIN — ACETAMINOPHEN 500MG 650 MILLIGRAM(S): 500 TABLET, COATED ORAL at 02:41

## 2024-12-19 RX ADMIN — Medication 40 MILLIGRAM(S): at 21:12

## 2024-12-19 RX ADMIN — Medication 60 MILLIGRAM(S): at 18:46

## 2024-12-19 RX ADMIN — IPRATROPIUM BROMIDE AND ALBUTEROL SULFATE 3 MILLILITER(S): 2.5; .5 SOLUTION RESPIRATORY (INHALATION) at 13:49

## 2024-12-19 RX ADMIN — ACETAMINOPHEN 500MG 650 MILLIGRAM(S): 500 TABLET, COATED ORAL at 02:11

## 2024-12-19 RX ADMIN — FLUTICASONE PROPIONATE AND SALMETEROL XINAFOATE 1 DOSE(S): 45; 21 AEROSOL, METERED RESPIRATORY (INHALATION) at 08:13

## 2024-12-19 NOTE — CHART NOTE - NSCHARTNOTEFT_GEN_A_CORE
Dr. Stark would like the patient transferred to [address] [place] [address].  Plan is for [facility] tomorrow.

## 2024-12-19 NOTE — CHART NOTE - NSCHARTNOTEFT_GEN_A_CORE
63 yo female pmh CAD s/p stents (2022) at [facility] (on Plavix)  HTN, COPD (on home O2 5L) , recent URI  presents to ER c/o shortness of breath x 1 week.   Pt with respiratory failure likely 2/2 COPD exacerbation 2/2 URI, NSTEMI. Critical care was consulted in ED who recommended admission to telemetry.   Trop 157>154>102  EKG NSR IRBBB; no acute ischemic changes  ECHO 12/10/24: normal global lvsf EF 54% sev concentric lvh  Pt s/p Gamaliel  Started on Solu-medrol  Pulm and cardiology consulted.  - inpatient [facility] on Fri 12/20 by Dr. Stark who also requested to transfer pt [place] on Thur 12/19 in readiness for cath  - AVAP during the procedure under the direction of anesthesia  - keep NPO PMN  - AM labs  - [facility] contacted to initiate transfer  - sign out was given to MAR (1972)    Plan was discussed with pt and pt agreeable to transfer.    Case was also discussed with Dr. Gardner

## 2024-12-19 NOTE — PROGRESS NOTE ADULT - SUBJECTIVE AND OBJECTIVE BOX
Patient is a 64y old  Female who presents with a chief complaint of SOB (16 Dec 2024 08:26)        HPI:  65 yo female PMHx of COPD (on home O2 5L) CAD s/p stents (on Plavix)  HTN presents to ER c/o shortness of breath x 1 week. pt states she had URI symptoms after Thanksgiving, she developed a productive cough with yellow sputum and worsening shortness of breath. Pt states today she had dyspnea only walking a few steps and was unable to catch her breath, no relief from rescue inhaler. In ER pt was placed on bipap, found to be acidotic and hypercapnic, also had elevated troponin, no EKG changes. ICU consulted and recommend admission to telemetry. Denies fever, chills, n/v/d, abdominal pain, dysuria.   (09 Dec 2024 23:00)      PAST MEDICAL & SURGICAL HISTORY:  HTN (hypertension)      Chronic obstructive pulmonary disease (COPD)      Hyperlipidemia      CAD (coronary artery disease)      S/P cholecystectomy      H/O tear of meniscus of knee joint          FAMILY HISTORY:  FH: myocardial infarction (Father)    Family history of valvular heart disease (Mother)          Pt evaluated on rounds.  I reviewed the radiology tests and hospital record.    I reviewed previous notes on this patient.    Interval Events: No overnight events.      CAM:    SAT:    SBT:      REVIEW OF SYSTEMS:   see HPI      OBJECTIVE:  ICU Vital Signs Last 24 Hrs  T(C): 36.4 (19 Dec 2024 05:00), Max: 36.9 (18 Dec 2024 21:00)  T(F): 97.5 (19 Dec 2024 05:00), Max: 98.4 (18 Dec 2024 21:00)  HR: 72 (19 Dec 2024 05:00) (72 - 84)  BP: 143/70 (19 Dec 2024 05:00) (126/61 - 150/77)  BP(mean): --  ABP: --  ABP(mean): --  RR: 18 (19 Dec 2024 05:00) (18 - 18)  SpO2: 97% (19 Dec 2024 05:00) (96% - 98%)    O2 Parameters below as of 18 Dec 2024 20:07  Patient On (Oxygen Delivery Method): nasal cannula  O2 Flow (L/min): 2            CAPILLARY BLOOD GLUCOSE            PHYSICAL EXAM:     · ENMT:   Airway patent,   Nasal mucosa clear.  Mouth with normal mucosa.   No thrush    · EYES:   Clear bilaterally,   pupils equal,   round and reactive to light.    · CARDIAC:   Normal rate,   regular rhythm.    Heart sounds S1, S2.   No murmurs, no rubs or gallops on auscultation  no edema        CAROTID:   normal systolic impulse  no bruits    · RESPIRATORY:   rales  normal chest expansion  no retractions or use of accessory muscles  percussion of chest demonstrates no hyperresonance or dullness    · GASTROINTESTINAL:  Abdomen soft,   non-tender,   + BS  liver/spleen not palpable    · SKIN:   Skin normal color for race,   warm, dry   No evidence of rash.    · HEME LYMPH:   no splenomegaly.  No cervical  lymphadenopathy.  no inguinal lymphadenopathy    HOSPITAL MEDICATIONS:  MEDICATIONS  (STANDING):  albuterol/ipratropium for Nebulization 3 milliLiter(s) Nebulizer every 6 hours  aspirin  chewable 81 milliGRAM(s) Oral daily  atorvastatin 40 milliGRAM(s) Oral at bedtime  chlorhexidine 2% Cloths 1 Application(s) Topical <User Schedule>  clopidogrel Tablet 75 milliGRAM(s) Oral daily  enoxaparin Injectable 40 milliGRAM(s) SubCutaneous every 24 hours  fluticasone propionate/ salmeterol 250-50 MICROgram(s) Diskus 1 Dose(s) Inhalation two times a day  hydrochlorothiazide 25 milliGRAM(s) Oral daily  influenza  Vaccine (HIGH DOSE) 0.5 milliLiter(s) IntraMuscular once  methylPREDNISolone sodium succinate Injectable 60 milliGRAM(s) IV Push every 12 hours  pantoprazole    Tablet 40 milliGRAM(s) Oral before breakfast    MEDICATIONS  (PRN):  acetaminophen     Tablet .. 650 milliGRAM(s) Oral every 6 hours PRN Temp greater or equal to 38C (100.4F), Mild Pain (1 - 3)  aluminum hydroxide/magnesium hydroxide/simethicone Suspension 30 milliLiter(s) Oral every 4 hours PRN Dyspepsia  guaifenesin/dextromethorphan Oral Liquid 10 milliLiter(s) Oral four times a day PRN Cough  melatonin 5 milliGRAM(s) Oral at bedtime PRN Insomnia  ondansetron Injectable 4 milliGRAM(s) IV Push every 8 hours PRN Nausea and/or Vomiting        LABS:                        12.2   14.04 )-----------( 353      ( 19 Dec 2024 07:08 )             38.5     12-18    137  |  94[L]  |  33[H]  ----------------------------<  190[H]  5.4[H]   |  33[H]  |  0.6[L]    Ca    9.2      18 Dec 2024 07:26    TPro  5.9[L]  /  Alb  4.0  /  TBili  0.2  /  DBili  x   /  AST  11  /  ALT  19  /  AlkPhos  67  12-18    PT/INR - ( 18 Dec 2024 07:26 )   PT: 10.50 sec;   INR: 0.89 ratio         PTT - ( 18 Dec 2024 07:26 )  PTT:24.5 sec  Urinalysis Basic - ( 18 Dec 2024 07:26 )    Color: x / Appearance: x / SG: x / pH: x  Gluc: 190 mg/dL / Ketone: x  / Bili: x / Urobili: x   Blood: x / Protein: x / Nitrite: x   Leuk Esterase: x / RBC: x / WBC x   Sq Epi: x / Non Sq Epi: x / Bacteria: x      SARS-CoV-2: NotDetec (10 Dec 2024 12:38)            RADIOLOGY: Today I personally interpreted the latest CXR and other pertinent films.

## 2024-12-19 NOTE — PROGRESS NOTE ADULT - SUBJECTIVE AND OBJECTIVE BOX
63 yo female PMHx of COPD (on home O2 5L) CAD s/p stents (on Plavix)  HTN  shortness of breath    lying in bed off of BIPAP    PAST MEDICAL & SURGICAL HISTORY:    HTN (hypertension)  Chronic obstructive pulmonary disease (COPD)  Hyperlipidemia  CAD (coronary artery disease)  S/P cholecystectomy  H/O tear of meniscus of knee joint    Social History:  smoker x 25 years, quit 3 years ago (09 Dec 2024 23:00)    MEDICATIONS  (STANDING):  albuterol/ipratropium for Nebulization 3 milliLiter(s) Nebulizer every 6 hours  aspirin  chewable 81 milliGRAM(s) Oral daily  atorvastatin 40 milliGRAM(s) Oral at bedtime  chlorhexidine 2% Cloths 1 Application(s) Topical <User Schedule>  clopidogrel Tablet 75 milliGRAM(s) Oral daily  fluticasone propionate/ salmeterol 250-50 MICROgram(s) Diskus 1 Dose(s) Inhalation two times a day  hydrochlorothiazide 25 milliGRAM(s) Oral daily  influenza  Vaccine (HIGH DOSE) 0.5 milliLiter(s) IntraMuscular once  methylPREDNISolone sodium succinate Injectable 60 milliGRAM(s) IV Push every 12 hours  pantoprazole    Tablet 40 milliGRAM(s) Oral before breakfast    MEDICATIONS  (PRN):  acetaminophen     Tablet .. 650 milliGRAM(s) Oral every 6 hours PRN Temp greater or equal to 38C (100.4F), Mild Pain (1 - 3)  aluminum hydroxide/magnesium hydroxide/simethicone Suspension 30 milliLiter(s) Oral every 4 hours PRN Dyspepsia  guaifenesin/dextromethorphan Oral Liquid 10 milliLiter(s) Oral four times a day PRN Cough  melatonin 5 milliGRAM(s) Oral at bedtime PRN Insomnia  ondansetron Injectable 4 milliGRAM(s) IV Push every 8 hours PRN Nausea and/or Vomiting        Allergies    No Known Allergies    Intolerances    Vital Signs Last 24 Hrs  T(C): 36.4 (19 Dec 2024 05:00), Max: 36.9 (18 Dec 2024 21:00)  T(F): 97.5 (19 Dec 2024 05:00), Max: 98.4 (18 Dec 2024 21:00)  HR: 72 (19 Dec 2024 05:00) (72 - 84)  BP: 143/70 (19 Dec 2024 05:00) (126/61 - 150/77)  BP(mean): --  RR: 18 (19 Dec 2024 05:00) (18 - 18)  SpO2: 97% (19 Dec 2024 05:00) (96% - 98%)    Parameters below as of 18 Dec 2024 20:07  Patient On (Oxygen Delivery Method): nasal cannula  O2 Flow (L/min): 2      Constitutional: NAD   HEENT: Airway patent, moist MM, no erythema/swelling/deformity of oral structures. EOMI, PERRLA.  CV: regular rate, regular rhythm, well-perfused extremities, 2+ b/l DP and radial pulses equal.  Lungs: bilateral air entry,  no rales, rhonchi   ABD: NTND, no guarding or rebound, no pulsatile mass, no hernias.   MSK: Neck supple, nontender, nl ROM, no stepoff. Chest nontender. Back nontender in TLS spine or to b/l bony structures or flanks. Ext nontender, nl rom, no deformity.   INTEG: Skin warm, dry, no rash.  NEURO: A&Ox3, normal strength, nl sensation throughout, normal speech.   PSYCH: Denies SI/HI/hallucinations    LAB:                           11.4   18.45 )-----------( 450      ( 16 Dec 2024 06:10 )             37.8   12-16    137  |  91[L]  |  31[H]  ----------------------------<  190[H]  5.1[H]   |  35[H]  |  0.7    Ca    9.5      16 Dec 2024 06:10  Mg     2.3     12-16

## 2024-12-19 NOTE — PROGRESS NOTE ADULT - ASSESSMENT
Impression:  Acute/chronic respiratory failure due to COPD with exacerbation  hypoxia  Currently no Impending respiratory failure  SHANKAR/OHVS  no pneumonia possible viral syndrome    Suggest:  Check O2 sat on NC, record, continue O2 as necessary to maintain sats > 90%  albuterol/ipratropium for Nebulization 3 milliLiter(s) Nebulizer every 6 hours  methylPREDNISolone  taper   AVAPS hs/PRN.  , EPAP 8, min IPAP 14, max IPAP 25. O2 to keep Karma@ >92%.  OOB to chair  GI and DVT prophylaxis  pulmonary toilet to prevent aspirations  off loading of skin to prevent pressure ulcers  Monitor clinically, convert to oral prednisone as clinical improvement allows  Upon D/C the patient will need ICS/LABA, LAMA, PRN albuterol, finish abx, slow taper of steroids ie. start Prednisone 40 mg and taper 10 mg Q4d.  will need OP NPSG    Pt at low risk for cardiac cath going Friday. I would use the AVAPS during the procedure under the direction of anesthesia.

## 2024-12-19 NOTE — PROGRESS NOTE ADULT - ASSESSMENT
65 yo female PMHx of COPD (on home O2 5L) CAD s/p stents (on Plavix)  HTN presents to ER c/o shortness of breath x 1 week.     # NSTEMI   # h/o CAD s/p stents      - cardiac monitoring   - trend troponin  +  - echo   - cardiology consult reviewed  - pt anticoagulated in ER  - c/w Plavix      # hypercapnic acidosis respiratory failure   # COPD exacerbation   # URI/cough   - supplemental O2, wean Bipap  - c/w solumedrol decr to daily then to prednisone taper   - c/w inhalers   - nebs prn   - LABA/ICS  - pulmonary consult reviewed     # h/o HTN  c/w HCTZ    # h/o HLD  c/w statin     # dvt ppx   # gi ppx   # full code     for Cath AM

## 2024-12-20 LAB
ANION GAP SERPL CALC-SCNC: 10 MMOL/L — SIGNIFICANT CHANGE UP (ref 7–14)
ANION GAP SERPL CALC-SCNC: 12 MMOL/L — SIGNIFICANT CHANGE UP (ref 7–14)
BUN SERPL-MCNC: 28 MG/DL — HIGH (ref 10–20)
BUN SERPL-MCNC: 36 MG/DL — HIGH (ref 10–20)
CALCIUM SERPL-MCNC: 8.7 MG/DL — SIGNIFICANT CHANGE UP (ref 8.4–10.4)
CALCIUM SERPL-MCNC: 9.6 MG/DL — SIGNIFICANT CHANGE UP (ref 8.4–10.5)
CHLORIDE SERPL-SCNC: 90 MMOL/L — LOW (ref 98–110)
CHLORIDE SERPL-SCNC: 91 MMOL/L — LOW (ref 98–110)
CO2 SERPL-SCNC: 30 MMOL/L — SIGNIFICANT CHANGE UP (ref 17–32)
CO2 SERPL-SCNC: 31 MMOL/L — SIGNIFICANT CHANGE UP (ref 17–32)
CREAT SERPL-MCNC: 0.6 MG/DL — LOW (ref 0.7–1.5)
CREAT SERPL-MCNC: 0.8 MG/DL — SIGNIFICANT CHANGE UP (ref 0.7–1.5)
EGFR: 81 ML/MIN/1.73M2 — SIGNIFICANT CHANGE UP
EGFR: 99 ML/MIN/1.73M2 — SIGNIFICANT CHANGE UP
GLUCOSE SERPL-MCNC: 197 MG/DL — HIGH (ref 70–99)
GLUCOSE SERPL-MCNC: 243 MG/DL — HIGH (ref 70–99)
HCT VFR BLD CALC: 38 % — SIGNIFICANT CHANGE UP (ref 37–47)
HCT VFR BLD CALC: 39.3 % — SIGNIFICANT CHANGE UP (ref 37–47)
HGB BLD-MCNC: 12.1 G/DL — SIGNIFICANT CHANGE UP (ref 12–16)
HGB BLD-MCNC: 12.3 G/DL — SIGNIFICANT CHANGE UP (ref 12–16)
MCHC RBC-ENTMCNC: 27.1 PG — SIGNIFICANT CHANGE UP (ref 27–31)
MCHC RBC-ENTMCNC: 27.5 PG — SIGNIFICANT CHANGE UP (ref 27–31)
MCHC RBC-ENTMCNC: 31.3 G/DL — LOW (ref 32–37)
MCHC RBC-ENTMCNC: 31.8 G/DL — LOW (ref 32–37)
MCV RBC AUTO: 85.2 FL — SIGNIFICANT CHANGE UP (ref 81–99)
MCV RBC AUTO: 87.9 FL — SIGNIFICANT CHANGE UP (ref 81–99)
NRBC # BLD: 0 /100 WBCS — SIGNIFICANT CHANGE UP (ref 0–0)
NRBC # BLD: 0 /100 WBCS — SIGNIFICANT CHANGE UP (ref 0–0)
PLATELET # BLD AUTO: 304 K/UL — SIGNIFICANT CHANGE UP (ref 130–400)
PLATELET # BLD AUTO: 337 K/UL — SIGNIFICANT CHANGE UP (ref 130–400)
PMV BLD: 10.5 FL — HIGH (ref 7.4–10.4)
PMV BLD: 10.8 FL — HIGH (ref 7.4–10.4)
POTASSIUM SERPL-MCNC: 3.9 MMOL/L — SIGNIFICANT CHANGE UP (ref 3.5–5)
POTASSIUM SERPL-MCNC: 4.7 MMOL/L — SIGNIFICANT CHANGE UP (ref 3.5–5)
POTASSIUM SERPL-SCNC: 3.9 MMOL/L — SIGNIFICANT CHANGE UP (ref 3.5–5)
POTASSIUM SERPL-SCNC: 4.7 MMOL/L — SIGNIFICANT CHANGE UP (ref 3.5–5)
RBC # BLD: 4.46 M/UL — SIGNIFICANT CHANGE UP (ref 4.2–5.4)
RBC # BLD: 4.47 M/UL — SIGNIFICANT CHANGE UP (ref 4.2–5.4)
RBC # FLD: 14.8 % — HIGH (ref 11.5–14.5)
RBC # FLD: 14.9 % — HIGH (ref 11.5–14.5)
SODIUM SERPL-SCNC: 131 MMOL/L — LOW (ref 135–146)
SODIUM SERPL-SCNC: 133 MMOL/L — LOW (ref 135–146)
WBC # BLD: 14.39 K/UL — HIGH (ref 4.8–10.8)
WBC # BLD: 16.7 K/UL — HIGH (ref 4.8–10.8)
WBC # FLD AUTO: 14.39 K/UL — HIGH (ref 4.8–10.8)
WBC # FLD AUTO: 16.7 K/UL — HIGH (ref 4.8–10.8)

## 2024-12-20 RX ORDER — SODIUM CHLORIDE 9 MG/ML
1000 INJECTION, SOLUTION INTRAMUSCULAR; INTRAVENOUS; SUBCUTANEOUS
Refills: 0 | Status: DISCONTINUED | OUTPATIENT
Start: 2024-12-20 | End: 2024-12-21

## 2024-12-20 RX ORDER — SODIUM CHLORIDE 9 MG/ML
250 INJECTION, SOLUTION INTRAMUSCULAR; INTRAVENOUS; SUBCUTANEOUS ONCE
Refills: 0 | Status: DISCONTINUED | OUTPATIENT
Start: 2024-12-20 | End: 2024-12-21

## 2024-12-20 RX ORDER — CLOPIDOGREL 75 MG/1
300 TABLET, FILM COATED ORAL ONCE
Refills: 0 | Status: COMPLETED | OUTPATIENT
Start: 2024-12-20 | End: 2024-12-20

## 2024-12-20 RX ADMIN — SODIUM CHLORIDE 50 MILLILITER(S): 9 INJECTION, SOLUTION INTRAMUSCULAR; INTRAVENOUS; SUBCUTANEOUS at 15:17

## 2024-12-20 RX ADMIN — Medication 60 MILLIGRAM(S): at 18:06

## 2024-12-20 RX ADMIN — PANTOPRAZOLE SODIUM 40 MILLIGRAM(S): 40 TABLET, DELAYED RELEASE ORAL at 05:17

## 2024-12-20 RX ADMIN — CLOPIDOGREL 75 MILLIGRAM(S): 75 TABLET, FILM COATED ORAL at 10:33

## 2024-12-20 RX ADMIN — Medication 40 MILLIGRAM(S): at 21:23

## 2024-12-20 RX ADMIN — IPRATROPIUM BROMIDE AND ALBUTEROL SULFATE 3 MILLILITER(S): 2.5; .5 SOLUTION RESPIRATORY (INHALATION) at 21:07

## 2024-12-20 RX ADMIN — IPRATROPIUM BROMIDE AND ALBUTEROL SULFATE 3 MILLILITER(S): 2.5; .5 SOLUTION RESPIRATORY (INHALATION) at 07:49

## 2024-12-20 RX ADMIN — ACETAMINOPHEN, DIPHENHYDRAMINE HCL, PHENYLEPHRINE HCL 5 MILLIGRAM(S): 325; 25; 5 TABLET ORAL at 21:22

## 2024-12-20 RX ADMIN — Medication 81 MILLIGRAM(S): at 10:33

## 2024-12-20 RX ADMIN — CLOPIDOGREL 300 MILLIGRAM(S): 75 TABLET, FILM COATED ORAL at 15:05

## 2024-12-20 RX ADMIN — ACETAMINOPHEN 500MG 650 MILLIGRAM(S): 500 TABLET, COATED ORAL at 21:22

## 2024-12-20 RX ADMIN — Medication 60 MILLIGRAM(S): at 05:17

## 2024-12-20 RX ADMIN — SODIUM CHLORIDE 50 MILLILITER(S): 9 INJECTION, SOLUTION INTRAMUSCULAR; INTRAVENOUS; SUBCUTANEOUS at 15:18

## 2024-12-20 NOTE — CHART NOTE - NSCHARTNOTESELECT_GEN_ALL_CORE
Cardiology/Event Note
Cath/Event Note
DAILY NOTE
LHC/Event Note
Non Invasive Ventilation/Event Note
Progress Note/Event Note
Pulmonary Recommendations/Event Note
Cardiology/Event Note
Interventional Cardiology/Event Note
Lab Work/Event Note
PA Note/Event Note
Pulmonary Clearence of Cardiac Cath/Event Note

## 2024-12-20 NOTE — CHART NOTE - NSCHARTNOTEFT_GEN_A_CORE
PROCEDURE:   - Left heart cath  - Intervention     PHYSICIAN: Dr. Stark  FELLOW: Dr. Russ    Pre-procedure Diagnosis: NSTEMI    Consent: Patient | Family Member |  Used   Anesthesia: Sedation | Local     ACCESS & CLOSURE:  6 Fr R Radial Artery -> D-stat    IV Contrast: 130 mL      Intervention: Successful PCI of distal LCx with balloon angioplasty s/p NATHANAEL x 1    Implants: ANGELA FRONTIER RX [id number]     AUC:      FINDINGS:     Coronary Dominance: Co-dominant    LM: Short segment with No angiographic evidence of CAD.     LAD: Patent stent in proximal segment. Mild disease overall.  D1: Mild disease.     CX: Proximal Mild disease. Distal 99% lesion s/p PCI with NATHANAEL.   OM1: Small vessel. Mild disease.   OM2: Small vessel. Mild disease.     Ramus: Medium sized. Mild disease.     RCA: Patent stent in mid segment. Overall Mild disease.   RPDA: Mild disease.     LVEDP: 20 mmHg     AV gradient: No significant gradient     ESTIMATED BLOOD LOSS: < 10 mL      CONDITION: Good   SPECIMEN REMOVED: N/A     POST-OP DIAGNOSIS:    - 1 Vessel Coronary Artery Disease     PLAN OF CARE:   [X] Return to In-patient bed   [X] Medications:   - Continue ASA 81 mg PO QD  - Continue Clopidogrel 75 mg PO QD  - High intensity statin   [X] LVEDP guided IV Fluids: NS @ 75cc/h x 6 hours  [X] Remove D-stat. Hold manual pressure if signs of hematoma or bleeding over radial access site.

## 2024-12-20 NOTE — CHART NOTE - NSCHARTNOTEFT_GEN_A_CORE
INTERVENTIONAL CARDIOLOGY:                                               PREOPERATIVE DAY OF PROCEDURE EVALUATION:  I have personally seen and examined the patient.  I agree with the history and physical which I have reviewed and noted any changes below.         63 yo female pmh CAD s/p stents (2022) at [facility] (on Plavix)  HTN, COPD (on home O2 5L) , recent URI  presents to ER c/o shortness of breath x 1 week.   Trop 157>154>102  ecg NSR IRBBB; no acute ischemic changes    In light of patient's symptoms and working diagnosis of NSTEMi, patient presents to cardiology dept for [facility] with possibel intervention                    Pre cath note:  indication:  [ ] STEMI                [x ] NSTEMI                 [ ] Acute coronary syndrome                   [ ]Unstable Angina   [ ] high risk  [ ] intermediate risk  [ ] low risk                   [ ] Stable Angina     non-invasive testing:                          Date:                     result: [ ] high risk  [ ] intermediate risk  [ ] low risk    Anti- Anginal medications:                    [ ] not used d/t                     [ ] used   ( ) BB     ( ) CCB      ( ) Nitrate   (  ) Ranexa          [ ] not used but strong indication not to use    Ejection Fraction                   [ ] <29            [ ] 30-39%   [ ] 40-49%     [ ]>50%    CHF          [ ] active (within last 14 days on meds   [ ] Chronic (on meds but no exacerbation)  NYHA Functional Class:  (  ) Class I (no limitations)  (  ) Class II (slight limitation)  (  ) Class III (marked limitation)  (  ) Class IV (symptoms at rest)    COPD                   [ ] mild (on chronic bronchodilators)  [ ] moderate (on chronic steroid therapy)      [ ] severe (indication for home O2 or PACO2 >50)    Other risk factors:                     [ ] Previous MI                     [ ] CVA/ stroke                    [ ] carotid stent/ CEA                    [ ] PVD/PAD- (arterial aneurysm, non-palpable pulses, tortuous vessel with inability to insert catheter, infra-renal dissection, renal or subclavian artery stenosis)                    [ ] previous CABG                    [ ] Renal Failure:  on HD  (  ) yes  (  ) no                    [ ] Diabetic  (  ) Type 1  (  ) Type 2                                         (  ) Insulin dependent  (  ) non-insulin dependent                                         (  ) Metformin  (  ) Januvia  (  ) Glimepiride  (  ) Glipizide  (  ) Glyburide  (  ) Actos                                         (  ) GLP-1 receptor agonists (Ozempic, Wegovy, Zepbound, Mounjaro, Trulicity, Byetta, Victoza)                                         (  ) SGLT2 Inhibitors (Farxiga, Jardiance, Invokana)                                         (  ) Other                            12.1   14.39 )-----------( 337      ( 20 Dec 2024 08:43 )             38.0     12-20    133[L]  |  90[L]  |  36[H]  ----------------------------<  243[H]  4.7   |  31  |  0.8    Ca    9.6      20 Dec 2024 08:43    TPro  6.1  /  Alb  4.1  /  TBili  0.4  /  DBili  x   /  AST  13  /  ALT  20  /  AlkPhos  69  12-19      Bleeding Risk: 1.2%    Pre-cath Hydration: ( x )  cc IV bolus x 1 over 1 hr followed by:    (  x) NS @ 75cc/hr until procedure (up to 2 hrs) if EF> 50%                                                                                                                             (  ) NS @ 50cc/hr until procedure (up to 2 hrs) if EF< 50%                                        (  ) No precath hydration d/t      RIGHT RADIAL ARTERY EVALUATION:  JOVANNA TEST: [] Negative          [x] Positive    EF:   Date:< from: TTE Echo Complete w/o Contrast w/ Doppler (12.10.24 @ 12:49) >      Summary:   1. Technically limited study.   2. Normal global left ventricular systolic function.   3. LV Ejection Fraction by Omalley's Method with a biplane EF of 54 %.   4. LV apex not well visualized.   5. Severe concentric left ventricular hypertrophy.   6. The left ventricular diastolic function could not be assessed in this   study.   7. Normal left atrial size.   8. Normal right atrial size.   9. Mild aortic valve stenosis.  10. Mild thickening and calcification of the posterior mitral valve   leaflet.  11. Mild to moderate mitral annular calcification.  12. Trace mitral valve regurgitation.  13. Adequate TR velocity was not obtained to accurately assess RVSP.  14. There is no evidence of pericardial effusion.    < end of copied text >        EKG:   Date:< from: 12 Lead ECG (12.09.24 @ 19:37) >    Diagnosis Line Sinus rhythm withFusion complexes  Left axis deviation  Incomplete right bundle branch block  Inferior infarct , age undetermined  Anteroseptal infarct , age undetermined  Abnormal ECG    < end of copied text >           (Signed electronically by __________)  12-20-24 @ 10:18 INTERVENTIONAL CARDIOLOGY:                                               PREOPERATIVE DAY OF PROCEDURE EVALUATION:  I have personally seen and examined the patient.  I agree with the history and physical which I have reviewed and noted any changes below.         63 yo female pmh CAD s/p stents (2022) at [facility] (on Plavix)  HTN, COPD (on home O2 5L) , recent URI  presents to ER c/o shortness of breath x 1 week.   Trop 157>154>102  ecg NSR IRBBB; no acute ischemic changes    In light of patient's symptoms and working diagnosis of NSTEMi, patient presents to cardiology dept for [facility] with possible intervention          Pre cath note:  indication:  [ ] STEMI                [x ] NSTEMI                 [ ] Acute coronary syndrome                   [ ]Unstable Angina   [ ] high risk  [ ] intermediate risk  [ ] low risk                   [ ] Stable Angina     non-invasive testing:                          Date:                     result: [ ] high risk  [ ] intermediate risk  [ ] low risk    Anti- Anginal medications:                    [x ] not used d/t- patient has COPD on home 02, BB c/i                  [ ] used   ( ) BB     ( ) CCB      ( ) Nitrate   (  ) Ranexa          [ ] not used but strong indication not to use    Ejection Fraction                   [ ] <29            [ ] 30-39%   [ ] 40-49%     [ ]>50%    CHF          [ ] active (within last 14 days on meds   [ ] Chronic (on meds but no exacerbation)  NYHA Functional Class:  (  ) Class I (no limitations)  (  ) Class II (slight limitation)  (  ) Class III (marked limitation)  (  ) Class IV (symptoms at rest)    COPD                   [ ] mild (on chronic bronchodilators)  [ ] moderate (on chronic steroid therapy)      [ ] severe (indication for home O2 or PACO2 >50)    Other risk factors:                     [ x] Previous MI                     [ ] CVA/ stroke                    [ ] carotid stent/ CEA                    [ ] PVD/PAD- (arterial aneurysm, non-palpable pulses, tortuous vessel with inability to insert catheter, infra-renal dissection, renal or subclavian artery stenosis)                    [ ] previous CABG                    [ ] Renal Failure:  on HD  (  ) yes  (  ) no                    [ ] Diabetic  (  ) Type 1  (  ) Type 2                                         (  ) Insulin dependent  (  ) non-insulin dependent                                         (  ) Metformin  (  ) Januvia  (  ) Glimepiride  (  ) Glipizide  (  ) Glyburide  (  ) Actos                                         (  ) GLP-1 receptor agonists (Ozempic, Wegovy, Zepbound, Mounjaro, Trulicity, Byetta, Victoza)                                         (  ) SGLT2 Inhibitors (Farxiga, Jardiance, Invokana)                                         (  ) Other                            12.1   14.39 )-----------( 337      ( 20 Dec 2024 08:43 )             38.0     12-20    133[L]  |  90[L]  |  36[H]  ----------------------------<  243[H]  4.7   |  31  |  0.8    Ca    9.6      20 Dec 2024 08:43    TPro  6.1  /  Alb  4.1  /  TBili  0.4  /  DBili  x   /  AST  13  /  ALT  20  /  AlkPhos  69  12-19      Bleeding Risk: 1.2%    Pre-cath Hydration: ( x )  cc IV bolus x 1 over 1 hr followed by:    (  x) NS @ 75cc/hr until procedure (up to 2 hrs) if EF> 50%                                                                                                                             (  ) NS @ 50cc/hr until procedure (up to 2 hrs) if EF< 50%                                        (  ) No precath hydration d/t      RIGHT RADIAL ARTERY EVALUATION:  JOVANNA TEST: [] Negative          [x] Positive    EF:   Date:< from: TTE Echo Complete w/o Contrast w/ Doppler (12.10.24 @ 12:49) >      Summary:   1. Technically limited study.   2. Normal global left ventricular systolic function.   3. LV Ejection Fraction by Omalley's Method with a biplane EF of 54 %.   4. LV apex not well visualized.   5. Severe concentric left ventricular hypertrophy.   6. The left ventricular diastolic function could not be assessed in this   study.   7. Normal left atrial size.   8. Normal right atrial size.   9. Mild aortic valve stenosis.  10. Mild thickening and calcification of the posterior mitral valve   leaflet.  11. Mild to moderate mitral annular calcification.  12. Trace mitral valve regurgitation.  13. Adequate TR velocity was not obtained to accurately assess RVSP.  14. There is no evidence of pericardial effusion.    < end of copied text >        EKG:   Date:< from: 12 Lead ECG (12.09.24 @ 19:37) >    Diagnosis Line Sinus rhythm withFusion complexes  Left axis deviation  Incomplete right bundle branch block  Inferior infarct , age undetermined  Anteroseptal infarct , age undetermined  Abnormal ECG    < end of copied text >           (Signed electronically by __________)  12-20-24 @ 10:18

## 2024-12-21 ENCOUNTER — TRANSCRIPTION ENCOUNTER (OUTPATIENT)
Age: 64
End: 2024-12-21

## 2024-12-21 VITALS — HEIGHT: 62.01 IN | WEIGHT: 281.09 LBS

## 2024-12-21 LAB
ANION GAP SERPL CALC-SCNC: 13 MMOL/L — SIGNIFICANT CHANGE UP (ref 7–14)
BUN SERPL-MCNC: 36 MG/DL — HIGH (ref 10–20)
CALCIUM SERPL-MCNC: 9.2 MG/DL — SIGNIFICANT CHANGE UP (ref 8.4–10.5)
CHLORIDE SERPL-SCNC: 92 MMOL/L — LOW (ref 98–110)
CO2 SERPL-SCNC: 29 MMOL/L — SIGNIFICANT CHANGE UP (ref 17–32)
CREAT SERPL-MCNC: 0.7 MG/DL — SIGNIFICANT CHANGE UP (ref 0.7–1.5)
EGFR: 95 ML/MIN/1.73M2 — SIGNIFICANT CHANGE UP
GLUCOSE SERPL-MCNC: 236 MG/DL — HIGH (ref 70–99)
HCT VFR BLD CALC: 36.2 % — LOW (ref 37–47)
HGB BLD-MCNC: 11.3 G/DL — LOW (ref 12–16)
MCHC RBC-ENTMCNC: 27.6 PG — SIGNIFICANT CHANGE UP (ref 27–31)
MCHC RBC-ENTMCNC: 31.2 G/DL — LOW (ref 32–37)
MCV RBC AUTO: 88.5 FL — SIGNIFICANT CHANGE UP (ref 81–99)
NRBC # BLD: 0 /100 WBCS — SIGNIFICANT CHANGE UP (ref 0–0)
PLATELET # BLD AUTO: 294 K/UL — SIGNIFICANT CHANGE UP (ref 130–400)
PMV BLD: 11.3 FL — HIGH (ref 7.4–10.4)
POTASSIUM SERPL-MCNC: 4.7 MMOL/L — SIGNIFICANT CHANGE UP (ref 3.5–5)
POTASSIUM SERPL-SCNC: 4.7 MMOL/L — SIGNIFICANT CHANGE UP (ref 3.5–5)
RBC # BLD: 4.09 M/UL — LOW (ref 4.2–5.4)
RBC # FLD: 15 % — HIGH (ref 11.5–14.5)
SODIUM SERPL-SCNC: 134 MMOL/L — LOW (ref 135–146)
WBC # BLD: 13.58 K/UL — HIGH (ref 4.8–10.8)
WBC # FLD AUTO: 13.58 K/UL — HIGH (ref 4.8–10.8)

## 2024-12-21 PROCEDURE — 99238 HOSP IP/OBS DSCHRG MGMT 30/<: CPT

## 2024-12-21 PROCEDURE — 93010 ELECTROCARDIOGRAM REPORT: CPT

## 2024-12-21 RX ORDER — HYDROCHLOROTHIAZIDE 50 MG
1 TABLET ORAL
Qty: 90 | Refills: 0
Start: 2024-12-21 | End: 2025-03-20

## 2024-12-21 RX ORDER — IPRATROPIUM BROMIDE AND ALBUTEROL SULFATE 2.5; .5 MG/3ML; MG/3ML
3 SOLUTION RESPIRATORY (INHALATION)
Qty: 1080 | Refills: 0
Start: 2024-12-21 | End: 2025-03-20

## 2024-12-21 RX ORDER — HYDROCHLOROTHIAZIDE 50 MG
0 TABLET ORAL
Qty: 0 | Refills: 0 | DISCHARGE

## 2024-12-21 RX ORDER — ALBUTEROL 90 MCG
0 AEROSOL (GRAM) INHALATION
Qty: 0 | Refills: 5 | DISCHARGE

## 2024-12-21 RX ORDER — BUDESONIDE/FORMOTEROL FUMARATE 80-4.5 MCG
0 HFA AEROSOL WITH ADAPTER (GRAM) INHALATION
Qty: 0 | Refills: 0 | DISCHARGE

## 2024-12-21 RX ORDER — PREDNISONE 20 MG/1
1 TABLET ORAL
Qty: 20 | Refills: 0
Start: 2024-12-21

## 2024-12-21 RX ORDER — ROSUVASTATIN CALCIUM 5 MG/1
0 TABLET, FILM COATED ORAL
Qty: 0 | Refills: 0 | DISCHARGE

## 2024-12-21 RX ORDER — CLOPIDOGREL 75 MG/1
1 TABLET, FILM COATED ORAL
Qty: 90 | Refills: 11
Start: 2024-12-21 | End: 2027-12-05

## 2024-12-21 RX ORDER — PANTOPRAZOLE SODIUM 40 MG/1
1 TABLET, DELAYED RELEASE ORAL
Qty: 90 | Refills: 2
Start: 2024-12-21 | End: 2025-09-16

## 2024-12-21 RX ORDER — BUDESONIDE/FORMOTEROL FUMARATE 80-4.5 MCG
2 HFA AEROSOL WITH ADAPTER (GRAM) INHALATION
Qty: 3 | Refills: 3
Start: 2024-12-21 | End: 2025-12-15

## 2024-12-21 RX ADMIN — PANTOPRAZOLE SODIUM 40 MILLIGRAM(S): 40 TABLET, DELAYED RELEASE ORAL at 05:20

## 2024-12-21 RX ADMIN — CHLORHEXIDINE GLUCONATE 1 APPLICATION(S): 1.2 RINSE ORAL at 05:30

## 2024-12-21 RX ADMIN — IPRATROPIUM BROMIDE AND ALBUTEROL SULFATE 3 MILLILITER(S): 2.5; .5 SOLUTION RESPIRATORY (INHALATION) at 02:16

## 2024-12-21 RX ADMIN — ENOXAPARIN SODIUM 40 MILLIGRAM(S): 30 INJECTION SUBCUTANEOUS at 05:19

## 2024-12-21 RX ADMIN — ACETAMINOPHEN 500MG 650 MILLIGRAM(S): 500 TABLET, COATED ORAL at 05:54

## 2024-12-21 RX ADMIN — Medication 60 MILLIGRAM(S): at 05:20

## 2024-12-21 RX ADMIN — IPRATROPIUM BROMIDE AND ALBUTEROL SULFATE 3 MILLILITER(S): 2.5; .5 SOLUTION RESPIRATORY (INHALATION) at 09:32

## 2024-12-21 NOTE — DISCHARGE NOTE PROVIDER - HOSPITAL COURSE
65 yo female PMHx of COPD (on home O2 5L) CAD s/p stents (on Plavix)  HTN presents to ER c/o shortness of breath x 1 week. pt states she had URI symptoms after Thanksgiving, she developed a productive cough with yellow sputum and worsening shortness of breath. Pt states today she had dyspnea only walking a few steps and was unable to catch her breath, no relief from rescue inhaler. In ER pt was placed on bipap, found to be acidotic and hypercapnic, also had elevated troponin, no EKG changes. ICU consulted and recommend admission to telemetry. Denies fever, chills, n/v/d, abdominal pain, dysuria.    # NSTEMI   # h/o CAD s/p stents   - Admitted for cardiac monitoring   - Troponin trended  - echo: LVEF 54%  - pt anticoagulated in ER  - Cardiology consulted,  Successful PCI of distal LCx with balloon angioplasty s/p NATHANAEL x 1  - Loaded with ASA and plavix     # hypercapnic acidosis respiratory failure   # COPD exacerbation   # URI/cough   - Seen by pulm  - Completed a course of antibiotics  - supplemental O2, wean Bipap  - started on IV steroids, tapered  - c/w inhalers   - nebs prn   - LABA/ICS    # h/o HTN  c/w HCTZ    # h/o HLD  c/w statin     # dvt ppx   # gi ppx     Discussion of discharge plan of care, including discharge diagnoses, medication reconciliation, and follow-ups was conducted with Dr. Boyer on 12/21/2024 and discharge was approved.

## 2024-12-21 NOTE — DISCHARGE NOTE NURSING/CASE MANAGEMENT/SOCIAL WORK - FINANCIAL ASSISTANCE
[facility] provides services at a reduced cost to those who are determined to be eligible through [facility]’s financial assistance program. Information regarding [facility]’s financial assistance program can be found by going to [email or web address] or by calling [phone].

## 2024-12-21 NOTE — DISCHARGE NOTE PROVIDER - PROVIDER TOKENS
PROVIDER:[TOKEN:[[id number]],FOLLOWUP:[1 week],ESTABLISHEDPATIENT:[T]],PROVIDER:[TOKEN:[[id number]],FOLLOWUP:[1 week],ESTABLISHEDPATIENT:[T]]

## 2024-12-21 NOTE — DISCHARGE NOTE PROVIDER - NSDCCPCAREPLAN_GEN_ALL_CORE_FT
PRINCIPAL DISCHARGE DIAGNOSIS  Diagnosis: NSTEMI (non-ST elevated myocardial infarction)  Assessment and Plan of Treatment: You presented initially with shortness of breath and a productive cough. You were found to have a pneumonia which caused these symptoms. You finished a course of antibiotics and received IV corticosteroids which helped decrease the inflammation.  You were noted to have elevated cardiac enzymes for which a cardiac catheterization was done and you had a blockage in one of the arteries. So a stent was placed and you should continue taking the blood thinners aspirin and plavix on a daily basis. Also note that you will need to take a higher dose of your cholesterol medication. So you will be started on Lipitor 40 mg once at bedtime everyday isntead of the crestor.  You will have to follow up in the clinics with your cardiologist Dr. Stark and your pulmonoligst Dr. Lanier or Dr. Juarez within a week from discharge.      SECONDARY DISCHARGE DIAGNOSES  Diagnosis: COPD exacerbation  Assessment and Plan of Treatment:     Diagnosis: Acute respiratory failure with hypercapnia  Assessment and Plan of Treatment:

## 2024-12-21 NOTE — PROGRESS NOTE ADULT - SUBJECTIVE AND OBJECTIVE BOX
Cardiology Follow up s/p PCI LCX    HARIS TO   64y Female  PAST MEDICAL & SURGICAL HISTORY:  HTN (hypertension)      Chronic obstructive pulmonary disease (COPD)      Hyperlipidemia      CAD (coronary artery disease)      S/P cholecystectomy      H/O tear of meniscus of knee joint           HPI:  63 yo female PMHx of COPD (on home O2 5L) CAD s/p stents (on Plavix)  HTN presents to ER c/o shortness of breath x 1 week. pt states she had URI symptoms after Thanksgiving, she developed a productive cough with yellow sputum and worsening shortness of breath. Pt states today she had dyspnea only walking a few steps and was unable to catch her breath, no relief from rescue inhaler. In ER pt was placed on bipap, found to be acidotic and hypercapnic, also had elevated troponin, no EKG changes. ICU consulted and recommend admission to telemetry. Denies fever, chills, n/v/d, abdominal pain, dysuria.   (09 Dec 2024 23:00)    Allergies    No Known Allergies    Intolerances      Patient without complaints. Pt ambulated without issues/symptoms  Denies CP, SOB, palpitations, or dizziness  No events on telemetry overnight    Vital Signs Last 24 Hrs  T(C): 36.9 (21 Dec 2024 05:04), Max: 36.9 (21 Dec 2024 05:04)  T(F): 98.5 (21 Dec 2024 05:04), Max: 98.5 (21 Dec 2024 05:04)  HR: 74 (21 Dec 2024 05:04) (68 - 78)  BP: 114/66 (21 Dec 2024 05:04) (113/78 - 150/75)  BP(mean): --  RR: 18 (21 Dec 2024 05:04) (16 - 19)  SpO2: 94% (21 Dec 2024 05:04) (94% - 100%)    Parameters below as of 20 Dec 2024 18:55  Patient On (Oxygen Delivery Method): nasal cannula  O2 Flow (L/min): 3      MEDICATIONS  (STANDING):  albuterol/ipratropium for Nebulization 3 milliLiter(s) Nebulizer every 6 hours  aspirin  chewable 81 milliGRAM(s) Oral daily  atorvastatin 40 milliGRAM(s) Oral at bedtime  chlorhexidine 2% Cloths 1 Application(s) Topical <User Schedule>  clopidogrel Tablet 75 milliGRAM(s) Oral daily  enoxaparin Injectable 40 milliGRAM(s) SubCutaneous every 24 hours  fluticasone propionate/ salmeterol 250-50 MICROgram(s) Diskus 1 Dose(s) Inhalation two times a day  hydrochlorothiazide 25 milliGRAM(s) Oral daily  influenza  Vaccine (HIGH DOSE) 0.5 milliLiter(s) IntraMuscular once  methylPREDNISolone sodium succinate Injectable 60 milliGRAM(s) IV Push every 12 hours  pantoprazole    Tablet 40 milliGRAM(s) Oral before breakfast  sodium chloride 0.9% Bolus 250 milliLiter(s) IV Bolus once  sodium chloride 0.9%. 1000 milliLiter(s) (75 mL/Hr) IV Continuous <Continuous>  sodium chloride 0.9%. 1000 milliLiter(s) (50 mL/Hr) IV Continuous <Continuous>    MEDICATIONS  (PRN):  acetaminophen     Tablet .. 650 milliGRAM(s) Oral every 6 hours PRN Temp greater or equal to 38C (100.4F), Mild Pain (1 - 3)  aluminum hydroxide/magnesium hydroxide/simethicone Suspension 30 milliLiter(s) Oral every 4 hours PRN Dyspepsia  guaifenesin/dextromethorphan Oral Liquid 10 milliLiter(s) Oral four times a day PRN Cough  melatonin 5 milliGRAM(s) Oral at bedtime PRN Insomnia  ondansetron Injectable 4 milliGRAM(s) IV Push every 8 hours PRN Nausea and/or Vomiting      REVIEW OF SYSTEMS:          CONSTITUTIONAL: No weakness, fevers or chills          EYES/ENT: No visual changes;  No vertigo or throat pain           NECK: No pain or stiffness          RESPIRATORY: No cough, wheezing, hemoptysis          CARDIOVASCULAR: no pain, no MAIER, no palpitations           GASTROINTESTINAL: No abdominal or epigastric pain. No nausea, vomiting, or hematemesis;           GENITOURINARY: No dysuria, frequency or hematuria          NEUROLOGICAL: No numbness or weakness          SKIN: No itching, rashes    PHYSICAL EXAM:           CONSTITUTIONAL: Well-developed; well-nourished; in no acute distress  	SKIN: warm, dry  	HEAD: Normocephalic; atraumatic  	EYES: PERRL.  	ENT: No nasal discharge, airway clear, mucous membranes moist  	NECK: Supple; non tender.  	CARD: +S1, +S2, no murmurs, gallops, or rubs. (Regular) rate and rhythm    	RESP: No wheezes, rales or rhonchi. CTA B/L, (on home O2 at home )  	ABD: soft ntnd, + BS x 4 quadrants, obese  	EXT: moves all extremities,  no clubbing, cyanosis or edema  	NEURO: Alert and oriented x3, no focal deficits          PSYCH: Cooperative, appropriate          VASCULAR:  + Rad / + PTs / + DPs          EXTREMITY:  	   Right Radial: Dressing to be removed at 1800 by pt (24 hrs after dressing was applied on 12/10 @ 1800), access site soft, + pulses, no hematoma, no pain, no numbness, no signs and symptoms of infection             ECG: SR, inc RBBB, no acute ischemic changes                                                                                                                2D ECHO:Patient Name:   HARIS TO Accession #: [id number]  Medical Rec #:  [id number]    Height:      62.0 in 157.5 cm  YOB: 1960   Weight:      281.0 lb 127.46 kg  Patient Age:    64 years    BSA:         2.21 m²  Patient Gender: F           BP:          105/69 mmHg      Date of Exam:        12/10/2024 12:49:44 PM  Referring Physician: Zeynep Mccarthy  Sonographer:         Nancy Scales  Reading Physician:   Santiago Tam.    Procedure:     2D Echo/Doppler/Color Doppler Complete.  Indications:   R06.00 - Dyspnea, unspecified  Diagnosis:     R06.00 - Dyspnea, unspecified  Study Details: Technically limited study.        Summary:   1. Technically limited study.   2. Normal global left ventricular systolic function.   3. LV Ejection Fraction by Omalley's Method with a biplane EF of 54 %.   4. LV apex not well visualized.   5. Severe concentric left ventricular hypertrophy.   6. The left ventricular diastolic function could not be assessed in this   study.   7. Normal left atrial size.   8. Normal right atrial size.   9. Mild aortic valve stenosis.  10. Mild thickening and calcification of the posterior mitral valve   leaflet.  11. Mild to moderate mitral annular calcification.  12. Trace mitral valve regurgitation.  13. Adequate TR velocity was not obtained to accurately assess RVSP.  14. There is no evidence of pericardial effusion.      LABS:                        12.3   16.70 )-----------( 304      ( 20 Dec 2024 18:50 )             39.3     12-20    131[L]  |  91[L]  |  28[H]  ----------------------------<  197[H]  3.9   |  30  |  0.6[L]    Ca    8.7      20 Dec 2024 14:45              A/P:  I discussed the case with Cardiologist Dr. Stark and recommend the following:     Pre-procedure Diagnosis: NSTEMI  Intervention: Successful PCI of distal LCx with balloon angioplasty s/p NATHANAEL x 1  Implants: ANGELA FRONTIER RX [id number]     AUC:      FINDINGS:     Coronary Dominance: Co-dominant    LM: Short segment with No angiographic evidence of CAD.     LAD: Patent stent in proximal segment. Mild disease overall.  D1: Mild disease.     CX: Proximal Mild disease. Distal 99% lesion s/p PCI with NATHANAEL.   OM1: Small vessel. Mild disease.   OM2: Small vessel. Mild disease.     Ramus: Medium sized. Mild disease.     RCA: Patent stent in mid segment. Overall Mild disease.   RPDA: Mild disease.     LVEDP: 20 mmHg     AV gradient: No significant gradient     ESTIMATED BLOOD LOSS: < 10 mL      CONDITION: Good   SPECIMEN REMOVED: N/A     POST-OP DIAGNOSIS:    - 1 Vessel Coronary Artery Disease     PLAN OF CARE:   [X] Return to In-patient bed   [X] Medications:   - Continue ASA 81 mg PO QD  - Continue Clopidogrel 75 mg PO QD  - High intensity statin   [X] LVEDP guided IV Fluids: NS @ 75cc/h x 6 hours  [X] Remove D-stat. Hold manual pressure if signs of hematoma or bleeding over radial access site.      Electronic Signatures:  Bharati Russ)   (Signed 20-Dec-2024 15:02)  	Authored: Note Type, Note  Giselle Stark)   (Signed 20-Dec-2024 20:04)  	Co-Signer: Note Type, Note    Last Updated: 20-Dec-2024 20:04 by Giselle Stark)    	     Continue DAPT (Ec Asa 81mg po daily, plavix 75mg po daily), Statin Therapy ( resume home crestor 10mg), HCTZ, PPI                   No BB d/t intolerance in past per pt and respiratory disease                   No ACE/ARB d/t episodes of soft BP, pt nondiabetic and EF n/l, reevaluate as OP                   will send for fasting lipid profile as OP                    DVT/GI prophylaxis                   Patient given 30 day supply of (EC  Aspirin 81 mg daily and Plavix 75 mg daily ) to take at home                   Right Radial Dressing to be removed at 1800 by pt (24 hrs after dressing was applied on 12/10 @ 1800), access site soft, + pulses, no hematoma, no pain, no numbness, no signs and symptoms of infection                    OOB-CH, ambulate with assistance                    monitor access site/pulses                   Patient agreeing to take DAPT for at least one year or as directed by cardiologist                    Pt given instructions on importance of taking antiplatelet medication or risk acute stent thrombosis/death                   Post cath instructions, access site care and activity restrictions reviewed with patient                   Cardiac rehab information provided/referral and communication to Cardiac Rehab completed                      Benefits of Cardiac Rehab discussed with patient                   Patient instructed to call Cardiac Rehab and make first appointment after first f/u visit with Cardiologist                    Discussed with patient to return to hospital if experience chest pain, shortness breath, dizziness and site bleeding                   Aggressive risk factor modification, diet counseling, smoking cessation discussed with patient                       Can discharge patient from Interventional Cardiology  standpoint if labs/ekg/site WNL and ambulating without symptoms                    Follow up with Cardiologist,  Dr. Stark  in 1-2 weeks.  Patient instructed to call and make an appointment                      Discharge instructions as follows:                   - Continue medical regimen as prescribed to prevent chest pain                   - Continue dual anti-platelet therapy,, statin, HCTZ, PPI                   - If you are diabetic and taking medication containing Metformin, do not take them for 48 hours after the procedure                   - Instructed to call 911 if chest pain, shortness of breath or bleeding from access site                   - No heavy lifting >10lbs x 1 week                   - No driving x 24 hours                   - No baths, swimming pools x 1 week, may shower                   - Low sodium low fat low cholesterol diet                   - Follow-up with Cardiologist in 1-2 weeks after discharge

## 2024-12-21 NOTE — DISCHARGE NOTE PROVIDER - NSDCFUSCHEDAPPT_GEN_ALL_CORE_FT
Yasmani Juarez  [facility]  PULED [address]  Scheduled Appointment: 01/07/2025

## 2024-12-21 NOTE — PROGRESS NOTE ADULT - PROVIDER SPECIALTY LIST ADULT
Cardiology
Internal Medicine
Pulmonology
Cardiology
Internal Medicine
Intervent Cardiology
Pulmonology
Pulmonology
Cardiology
Hospitalist
Internal Medicine
Pulmonology
Pulmonology

## 2024-12-21 NOTE — DISCHARGE NOTE NURSING/CASE MANAGEMENT/SOCIAL WORK - NSDCFUADDAPPT_GEN_ALL_CORE_FT
Do you need a primary care doctor or follow-up with a specialist? Our care coordinators will help you find providers near you and schedule any follow-up care visits.    Monday-Friday: 9am-5pm    Call our [facility] team: [phone]

## 2024-12-21 NOTE — PROGRESS NOTE ADULT - ASSESSMENT
63 yo female PMHx of COPD (on home O2 5L) CAD s/p stents (on Plavix)  HTN presents to ER c/o shortness of breath x 1 week.     # NSTEMI   # h/o CAD s/p stents   # hypercapnic acidosis respiratory failure   # COPD exacerbation   # URI/cough   # h/o HTN      Plan  s/p cath 1 vessel disease  cleared by cardio for discharge  cont cardiac meds Asa, plavix, statin   Pulm recommended prednisone taper  - cont O2,at home   - c/w inhalers     Pt wants to go home  discharge today to f/u with PMD, Pulm, cardio as an out pt

## 2024-12-21 NOTE — DISCHARGE NOTE PROVIDER - CARE PROVIDERS DIRECT ADDRESSES
,DirectAddress_Unknown,[email or web address]

## 2024-12-21 NOTE — DISCHARGE NOTE PROVIDER - NSDCFUADDINST_GEN_ALL_CORE_FT
- Call 911 if chest pain, shortness of breath or bleeding from access site  - No heavy lifting >10lbs x 1 week  - No driving x 24 hours  - No baths, swimming pools x 1 week, may shower  - Low sodium low fat low cholesterol diet

## 2024-12-21 NOTE — DISCHARGE NOTE PROVIDER - DISCHARGE DATE
Continue Hydrea 500 mg twice daily     Let me know if the headache and dizziness continue and I will order CT Scan.     Get labs drawn in 6 weeks.  I have given you the orders.   
21-Dec-2024

## 2024-12-21 NOTE — DISCHARGE NOTE PROVIDER - CARE PROVIDER_API CALL
Giselle Stark  Interventional Cardiology  [address] [place] [address]  Phone: [phone]  Fax: [phone]  Established Patient  Follow Up Time: 1 week    Joaquin Lanier Sage  Pulmonary [address] [address]  Phone: [phone]  Fax: [phone]  Established Patient  Follow Up Time: 1 week

## 2024-12-21 NOTE — DISCHARGE NOTE NURSING/CASE MANAGEMENT/SOCIAL WORK - PATIENT PORTAL LINK FT
You can access the FollowMyHealth Patient Portal offered by [facility] by registering at the following website: [email or web address]. By joining [employer]’s FollowMyHealth portal, you will also be able to view your health information using other applications (apps) compatible with our system.

## 2024-12-21 NOTE — DISCHARGE NOTE PROVIDER - NSDCFUADDAPPT_GEN_ALL_CORE_FT
Do you need a primary care doctor or follow-up with a specialist? Our care coordinators will help you find providers near you and schedule any follow-up care visits.    Monday-Friday: 9am-5pm    Call our [facility] team: [phone]

## 2024-12-21 NOTE — DISCHARGE NOTE PROVIDER - NSDCMRMEDTOKEN_GEN_ALL_CORE_FT
aspirin 81 mg oral delayed release tablet: 1 tab(s) orally once a day  atorvastatin 40 mg oral tablet: 1 tab(s) orally once a day (at bedtime)  clopidogrel 75 mg oral tablet: 1 tab(s) orally once a day  hydroCHLOROthiazide 25 mg oral tablet: 1 tab(s) orally once a day  ipratropium-albuterol 0.5 mg-2.5 mg/3 mL inhalation solution: 3 milliliter(s) inhaled every 6 hours  pantoprazole 40 mg oral delayed release tablet: 1 tab(s) orally once a day (before a meal)  predniSONE 20 mg oral tablet: 1 tab(s) orally once a day Take 2 tablets once a day on 12/22 for 4 days then take 1.5 tablets once a day on 12/26 for 4 days,  then 1 tablet once a day on 12/30 for 4 days, then 0.5 tablet on 1/3 for 4 days then stop.  Symbicort 160 mcg-4.5 mcg/inh inhalation aerosol: 2 inhaled twice a day before breakfast and dinner   aspirin 81 mg oral delayed release tablet: 1 tab(s) orally once a day  atorvastatin 40 mg oral tablet: 1 tab(s) orally once a day (at bedtime)  clopidogrel 75 mg oral tablet: 1 tab(s) orally once a day  hydroCHLOROthiazide 25 mg oral tablet: 1 tab(s) orally once a day  ipratropium-albuterol 0.5 mg-2.5 mg/3 mL inhalation solution: 3 milliliter(s) inhaled every 6 hours  pantoprazole 40 mg oral delayed release tablet: 1 tab(s) orally once a day (before a meal)  predniSONE 20 mg oral tablet: 1 tab(s) orally once a day Take 2 tablets once a day on 12/22 for 4 days then take 1.5 tablets once a day on 12/26 for 4 days,  then 1 tablet once a day on 12/30 for 4 days, then 0.5 tablet on 1/3 for 4 days then stop.  Symbicort 160 mcg-4.5 mcg/inh inhalation aerosol: 2 inhaled twice a day before breakfast and dinner  tiotropium 18 mcg inhalation capsule: 1 cap(s) inhaled once a day

## 2024-12-21 NOTE — PROGRESS NOTE ADULT - SUBJECTIVE AND OBJECTIVE BOX
INTERVAL HPI/OVERNIGHT EVENTS:    63 yo female PMHx of COPD (on home O2 5L) CAD s/p stents (on Plavix)  HTN presents to ER c/o shortness of breath x 1 week  feels good  wants to go home    REVIEW OF SYSTEMS:  CONSTITUTIONAL: No fever, weight loss, or fatigue  EYES: No eye pain, visual disturbances, or discharge  ENMT:  No difficulty hearing, tinnitus, vertigo; No sinus or throat pain  NECK: No pain or stiffness  BREASTS: No pain, masses, or nipple discharge  RESPIRATORY: No cough, wheezing, chills or hemoptysis; No shortness of breath  CARDIOVASCULAR: No chest pain, palpitations, dizziness, or leg swelling  GASTROINTESTINAL: No abdominal or epigastric pain. No nausea, vomiting, or hematemesis; No diarrhea or constipation. No melena or hematochezia.  GENITOURINARY: No dysuria, frequency, hematuria, or incontinence  NEUROLOGICAL: No headaches, memory loss, loss of strength, numbness, or tremors  SKIN: No itching, burning, rashes, or lesions   LYMPH NODES: No enlarged glands  ENDOCRINE: No heat or cold intolerance; No hair loss  MUSCULOSKELETAL: No joint pain or swelling; No muscle, back, or extremity pain  PSYCHIATRIC: No depression, anxiety, mood swings, or difficulty sleeping  HEME/LYMPH: No easy bruising, or bleeding gums  ALLERY AND IMMUNOLOGIC: No hives or eczema    VITALS:  T(F): 98.5, Max: 98.5 (12-21-24 @ 05:04)  HR: 74  BP: 114/66  RR: 18  SpO2: 94%    PHYSICAL EXAM:  GENERAL: NAD,   HEAD:  Atraumatic, Normocephalic  EYES: EOMI, PERRLA, conjunctiva and sclera clear  ENMT: No tonsillar erythema, exudates  NECK: Supple, No JVD, Normal thyroid  NERVOUS SYSTEM:  Alert & Oriented X3,   CHEST/LUNG: mild rhonchi  HEART: Regular rate and rhythm; No murmurs, rubs, or gallops  ABDOMEN: Soft, Nontender, Nondistended; Bowel sounds present  EXTREMITIES: no edema  LYMPH: No lymphadenopathy noted  SKIN: No rashes or lesions      LABS:  Urinalysis Basic - ( 21 Dec 2024 07:40 )    Color: x / Appearance: x / SG: x / pH: x  Gluc: 236 mg/dL / Ketone: x  / Bili: x / Urobili: x   Blood: x / Protein: x / Nitrite: x   Leuk Esterase: x / RBC: x / WBC x   Sq Epi: x / Non Sq Epi: x / Bacteria: x      12-21    134[L]  |  92[L]  |  36[H]  ----------------------------<  236[H]  4.7   |  29  |  0.7    Ca    9.2      21 Dec 2024 07:40                            11.3   13.58 )-----------( 294      ( 21 Dec 2024 07:40 )             36.2           RADIOLOGY & ADDITIONAL TESTS:    Imaging Personally Reviewed:  [ ] YES  [ ] NO    MEDICATIONS:     MEDICATIONS  (STANDING):  albuterol/ipratropium for Nebulization 3 milliLiter(s) Nebulizer every 6 hours  aspirin  chewable 81 milliGRAM(s) Oral daily  atorvastatin 40 milliGRAM(s) Oral at bedtime  chlorhexidine 2% Cloths 1 Application(s) Topical <User Schedule>  clopidogrel Tablet 75 milliGRAM(s) Oral daily  enoxaparin Injectable 40 milliGRAM(s) SubCutaneous every 24 hours  fluticasone propionate/ salmeterol 250-50 MICROgram(s) Diskus 1 Dose(s) Inhalation two times a day  hydrochlorothiazide 25 milliGRAM(s) Oral daily  influenza  Vaccine (HIGH DOSE) 0.5 milliLiter(s) IntraMuscular once  methylPREDNISolone sodium succinate Injectable 60 milliGRAM(s) IV Push every 12 hours  pantoprazole    Tablet 40 milliGRAM(s) Oral before breakfast  sodium chloride 0.9% Bolus 250 milliLiter(s) IV Bolus once  sodium chloride 0.9%. 1000 milliLiter(s) (75 mL/Hr) IV Continuous <Continuous>  sodium chloride 0.9%. 1000 milliLiter(s) (50 mL/Hr) IV Continuous <Continuous>    MEDICATIONS  (PRN):  acetaminophen     Tablet .. 650 milliGRAM(s) Oral every 6 hours PRN Temp greater or equal to 38C (100.4F), Mild Pain (1 - 3)  aluminum hydroxide/magnesium hydroxide/simethicone Suspension 30 milliLiter(s) Oral every 4 hours PRN Dyspepsia  guaifenesin/dextromethorphan Oral Liquid 10 milliLiter(s) Oral four times a day PRN Cough  melatonin 5 milliGRAM(s) Oral at bedtime PRN Insomnia  ondansetron Injectable 4 milliGRAM(s) IV Push every 8 hours PRN Nausea and/or Vomiting

## 2024-12-31 DIAGNOSIS — J44.1 CHRONIC OBSTRUCTIVE PULMONARY DISEASE WITH (ACUTE) EXACERBATION: ICD-10-CM

## 2024-12-31 DIAGNOSIS — E87.29 OTHER ACIDOSIS: ICD-10-CM

## 2024-12-31 DIAGNOSIS — I25.10 ATHEROSCLEROTIC HEART DISEASE OF NATIVE CORONARY ARTERY WITHOUT ANGINA PECTORIS: ICD-10-CM

## 2024-12-31 DIAGNOSIS — E66.2 MORBID (SEVERE) OBESITY WITH ALVEOLAR HYPOVENTILATION: ICD-10-CM

## 2024-12-31 DIAGNOSIS — K21.9 GASTRO-ESOPHAGEAL REFLUX DISEASE WITHOUT ESOPHAGITIS: ICD-10-CM

## 2024-12-31 DIAGNOSIS — I10 ESSENTIAL (PRIMARY) HYPERTENSION: ICD-10-CM

## 2024-12-31 DIAGNOSIS — Z95.5 PRESENCE OF CORONARY ANGIOPLASTY IMPLANT AND GRAFT: ICD-10-CM

## 2024-12-31 DIAGNOSIS — I45.10 UNSPECIFIED RIGHT BUNDLE-BRANCH BLOCK: ICD-10-CM

## 2024-12-31 DIAGNOSIS — Z79.02 LONG TERM (CURRENT) USE OF ANTITHROMBOTICS/ANTIPLATELETS: ICD-10-CM

## 2024-12-31 DIAGNOSIS — I25.2 OLD MYOCARDIAL INFARCTION: ICD-10-CM

## 2024-12-31 DIAGNOSIS — E78.5 HYPERLIPIDEMIA, UNSPECIFIED: ICD-10-CM

## 2024-12-31 DIAGNOSIS — J06.9 ACUTE UPPER RESPIRATORY INFECTION, UNSPECIFIED: ICD-10-CM

## 2024-12-31 DIAGNOSIS — Z79.51 LONG TERM (CURRENT) USE OF INHALED STEROIDS: ICD-10-CM

## 2024-12-31 DIAGNOSIS — Z79.82 LONG TERM (CURRENT) USE OF ASPIRIN: ICD-10-CM

## 2024-12-31 DIAGNOSIS — Z99.81 DEPENDENCE ON SUPPLEMENTAL OXYGEN: ICD-10-CM

## 2024-12-31 DIAGNOSIS — J96.22 ACUTE AND CHRONIC RESPIRATORY FAILURE WITH HYPERCAPNIA: ICD-10-CM

## 2024-12-31 DIAGNOSIS — I21.4 NON-ST ELEVATION (NSTEMI) MYOCARDIAL INFARCTION: ICD-10-CM

## 2024-12-31 DIAGNOSIS — J96.21 ACUTE AND CHRONIC RESPIRATORY FAILURE WITH HYPOXIA: ICD-10-CM

## 2025-01-07 ENCOUNTER — APPOINTMENT (OUTPATIENT)
Dept: PULMONOLOGY | Facility: CLINIC | Age: 65
End: 2025-01-07
Payer: MEDICARE

## 2025-01-07 ENCOUNTER — APPOINTMENT (OUTPATIENT)
Dept: CARDIOTHORACIC SURGERY | Facility: CLINIC | Age: 65
End: 2025-01-07
Payer: MEDICARE

## 2025-01-07 VITALS
HEIGHT: 62 IN | WEIGHT: 282 LBS | DIASTOLIC BLOOD PRESSURE: 84 MMHG | OXYGEN SATURATION: 86 % | HEART RATE: 74 BPM | BODY MASS INDEX: 51.89 KG/M2 | SYSTOLIC BLOOD PRESSURE: 132 MMHG

## 2025-01-07 VITALS — HEIGHT: 62 IN | WEIGHT: 282 LBS | BODY MASS INDEX: 51.89 KG/M2

## 2025-01-07 DIAGNOSIS — G47.33 OBSTRUCTIVE SLEEP APNEA (ADULT) (PEDIATRIC): ICD-10-CM

## 2025-01-07 DIAGNOSIS — Z87.891 PERSONAL HISTORY OF NICOTINE DEPENDENCE: ICD-10-CM

## 2025-01-07 DIAGNOSIS — J96.12 CHRONIC RESPIRATORY FAILURE WITH HYPERCAPNIA: ICD-10-CM

## 2025-01-07 DIAGNOSIS — Z12.2 ENCOUNTER FOR SCREENING FOR MALIGNANT NEOPLASM OF RESPIRATORY ORGANS: ICD-10-CM

## 2025-01-07 DIAGNOSIS — J44.9 CHRONIC OBSTRUCTIVE PULMONARY DISEASE, UNSPECIFIED: ICD-10-CM

## 2025-01-07 DIAGNOSIS — E66.01 MORBID (SEVERE) OBESITY DUE TO EXCESS CALORIES: ICD-10-CM

## 2025-01-07 PROCEDURE — G2211 COMPLEX E/M VISIT ADD ON: CPT

## 2025-01-07 PROCEDURE — G0296 VISIT TO DETERM LDCT ELIG: CPT

## 2025-01-07 PROCEDURE — 99214 OFFICE O/P EST MOD 30 MIN: CPT

## 2025-01-07 RX ORDER — BUDESONIDE AND FORMOTEROL FUMARATE DIHYDRATE 160; 4.5 UG/1; UG/1
160-4.5 AEROSOL RESPIRATORY (INHALATION)
Qty: 30.6 | Refills: 0 | Status: ACTIVE | COMMUNITY
Start: 2025-01-07 | End: 1900-01-01

## 2025-01-07 RX ORDER — TIOTROPIUM BROMIDE INHALATION SPRAY 3.12 UG/1
2.5 SPRAY, METERED RESPIRATORY (INHALATION) DAILY
Qty: 3 | Refills: 3 | Status: ACTIVE | COMMUNITY
Start: 2025-01-07 | End: 1900-01-01

## 2025-01-08 ENCOUNTER — APPOINTMENT (OUTPATIENT)
Dept: ORTHOPEDIC SURGERY | Facility: HOSPITAL | Age: 65
End: 2025-01-08

## 2025-01-13 RX ORDER — UMECLIDINIUM 62.5 UG/1
62.5 AEROSOL, POWDER ORAL
Qty: 3 | Refills: 3 | Status: ACTIVE | COMMUNITY
Start: 1900-01-01 | End: 1900-01-01

## 2025-01-30 ENCOUNTER — APPOINTMENT (OUTPATIENT)
Facility: CLINIC | Age: 65
End: 2025-01-30

## 2025-02-20 ENCOUNTER — OUTPATIENT (OUTPATIENT)
Dept: OUTPATIENT SERVICES | Facility: HOSPITAL | Age: 65
LOS: 1 days | End: 2025-02-20
Payer: MEDICARE

## 2025-02-20 ENCOUNTER — RESULT REVIEW (OUTPATIENT)
Age: 65
End: 2025-02-20

## 2025-02-20 DIAGNOSIS — Z12.2 ENCOUNTER FOR SCREENING FOR MALIGNANT NEOPLASM OF RESPIRATORY ORGANS: ICD-10-CM

## 2025-02-20 DIAGNOSIS — Z00.8 ENCOUNTER FOR OTHER GENERAL EXAMINATION: ICD-10-CM

## 2025-02-20 DIAGNOSIS — Z90.49 ACQUIRED ABSENCE OF OTHER SPECIFIED PARTS OF DIGESTIVE TRACT: Chronic | ICD-10-CM

## 2025-02-20 DIAGNOSIS — Z87.828 PERSONAL HISTORY OF OTHER (HEALED) PHYSICAL INJURY AND TRAUMA: Chronic | ICD-10-CM

## 2025-02-20 PROCEDURE — 71271 CT THORAX LUNG CANCER SCR C-: CPT | Mod: 26

## 2025-02-20 PROCEDURE — 71271 CT THORAX LUNG CANCER SCR C-: CPT

## 2025-02-21 DIAGNOSIS — Z12.2 ENCOUNTER FOR SCREENING FOR MALIGNANT NEOPLASM OF RESPIRATORY ORGANS: ICD-10-CM

## 2025-05-12 ENCOUNTER — APPOINTMENT (OUTPATIENT)
Dept: PULMONOLOGY | Facility: CLINIC | Age: 65
End: 2025-05-12
Payer: MEDICARE

## 2025-05-12 VITALS
HEART RATE: 113 BPM | BODY MASS INDEX: 50.61 KG/M2 | WEIGHT: 275 LBS | RESPIRATION RATE: 16 BRPM | DIASTOLIC BLOOD PRESSURE: 76 MMHG | HEIGHT: 62 IN | SYSTOLIC BLOOD PRESSURE: 128 MMHG

## 2025-05-12 DIAGNOSIS — J44.9 CHRONIC OBSTRUCTIVE PULMONARY DISEASE, UNSPECIFIED: ICD-10-CM

## 2025-05-12 DIAGNOSIS — J96.11 CHRONIC RESPIRATORY FAILURE WITH HYPOXIA: ICD-10-CM

## 2025-05-12 DIAGNOSIS — G47.33 OBSTRUCTIVE SLEEP APNEA (ADULT) (PEDIATRIC): ICD-10-CM

## 2025-05-12 DIAGNOSIS — E66.01 MORBID (SEVERE) OBESITY DUE TO EXCESS CALORIES: ICD-10-CM

## 2025-05-12 DIAGNOSIS — Z12.2 ENCOUNTER FOR SCREENING FOR MALIGNANT NEOPLASM OF RESPIRATORY ORGANS: ICD-10-CM

## 2025-05-12 PROCEDURE — 99214 OFFICE O/P EST MOD 30 MIN: CPT

## 2025-05-12 PROCEDURE — G2211 COMPLEX E/M VISIT ADD ON: CPT

## 2025-05-28 ENCOUNTER — APPOINTMENT (OUTPATIENT)
Dept: SURGERY | Facility: CLINIC | Age: 65
End: 2025-05-28

## 2025-07-23 ENCOUNTER — APPOINTMENT (OUTPATIENT)
Dept: SURGERY | Facility: CLINIC | Age: 65
End: 2025-07-23
Payer: MEDICARE

## 2025-07-23 VITALS — BODY MASS INDEX: 48.03 KG/M2 | WEIGHT: 261 LBS | HEIGHT: 62 IN

## 2025-07-23 DIAGNOSIS — L72.3 SEBACEOUS CYST: ICD-10-CM

## 2025-07-23 DIAGNOSIS — R22.32 LOCALIZED SWELLING, MASS AND LUMP, LEFT UPPER LIMB: ICD-10-CM

## 2025-07-23 PROCEDURE — 24075 EXC ARM/ELBOW LES SC < 3 CM: CPT | Mod: LT

## 2025-07-23 PROCEDURE — 99203 OFFICE O/P NEW LOW 30 MIN: CPT | Mod: 57

## 2025-07-29 LAB — CORE LAB BIOPSY: NORMAL

## 2025-08-07 ENCOUNTER — NON-APPOINTMENT (OUTPATIENT)
Age: 65
End: 2025-08-07

## 2025-08-08 ENCOUNTER — APPOINTMENT (OUTPATIENT)
Dept: SURGERY | Facility: CLINIC | Age: 65
End: 2025-08-08

## 2025-09-15 ENCOUNTER — APPOINTMENT (OUTPATIENT)
Dept: PULMONOLOGY | Facility: CLINIC | Age: 65
End: 2025-09-15